# Patient Record
Sex: FEMALE | Race: WHITE | NOT HISPANIC OR LATINO | URBAN - METROPOLITAN AREA
[De-identification: names, ages, dates, MRNs, and addresses within clinical notes are randomized per-mention and may not be internally consistent; named-entity substitution may affect disease eponyms.]

---

## 2017-05-11 ENCOUNTER — OUTPATIENT (OUTPATIENT)
Dept: OUTPATIENT SERVICES | Facility: HOSPITAL | Age: 52
LOS: 1 days | End: 2017-05-11
Payer: COMMERCIAL

## 2017-05-11 ENCOUNTER — APPOINTMENT (OUTPATIENT)
Dept: MRI IMAGING | Facility: IMAGING CENTER | Age: 52
End: 2017-05-11

## 2017-05-11 DIAGNOSIS — Z00.8 ENCOUNTER FOR OTHER GENERAL EXAMINATION: ICD-10-CM

## 2017-05-11 PROCEDURE — C8937: CPT

## 2017-05-11 PROCEDURE — C8908: CPT

## 2017-05-11 PROCEDURE — A9585: CPT

## 2017-05-22 ENCOUNTER — APPOINTMENT (OUTPATIENT)
Dept: ULTRASOUND IMAGING | Facility: IMAGING CENTER | Age: 52
End: 2017-05-22

## 2017-05-22 ENCOUNTER — OUTPATIENT (OUTPATIENT)
Dept: OUTPATIENT SERVICES | Facility: HOSPITAL | Age: 52
LOS: 1 days | End: 2017-05-22
Payer: COMMERCIAL

## 2017-05-22 ENCOUNTER — APPOINTMENT (OUTPATIENT)
Dept: MAMMOGRAPHY | Facility: IMAGING CENTER | Age: 52
End: 2017-05-22

## 2017-05-22 DIAGNOSIS — Z00.8 ENCOUNTER FOR OTHER GENERAL EXAMINATION: ICD-10-CM

## 2017-05-22 PROCEDURE — 76641 ULTRASOUND BREAST COMPLETE: CPT

## 2017-05-22 PROCEDURE — G0279: CPT

## 2017-05-22 PROCEDURE — 77065 DX MAMMO INCL CAD UNI: CPT

## 2017-06-01 ENCOUNTER — OUTPATIENT (OUTPATIENT)
Dept: OUTPATIENT SERVICES | Facility: HOSPITAL | Age: 52
LOS: 1 days | End: 2017-06-01
Payer: COMMERCIAL

## 2017-06-01 ENCOUNTER — APPOINTMENT (OUTPATIENT)
Dept: MAMMOGRAPHY | Facility: IMAGING CENTER | Age: 52
End: 2017-06-01

## 2017-06-01 VITALS
HEIGHT: 65.5 IN | OXYGEN SATURATION: 100 % | RESPIRATION RATE: 16 BRPM | TEMPERATURE: 99 F | DIASTOLIC BLOOD PRESSURE: 77 MMHG | SYSTOLIC BLOOD PRESSURE: 118 MMHG | HEART RATE: 72 BPM | WEIGHT: 149.03 LBS

## 2017-06-01 DIAGNOSIS — Z98.890 OTHER SPECIFIED POSTPROCEDURAL STATES: Chronic | ICD-10-CM

## 2017-06-01 DIAGNOSIS — N63 UNSPECIFIED LUMP IN BREAST: ICD-10-CM

## 2017-06-01 DIAGNOSIS — Z00.00 ENCOUNTER FOR GENERAL ADULT MEDICAL EXAMINATION WITHOUT ABNORMAL FINDINGS: ICD-10-CM

## 2017-06-01 DIAGNOSIS — Z01.818 ENCOUNTER FOR OTHER PREPROCEDURAL EXAMINATION: ICD-10-CM

## 2017-06-01 LAB
HCT VFR BLD CALC: 40.2 % — SIGNIFICANT CHANGE UP (ref 34.5–45)
HGB BLD-MCNC: 13 G/DL — SIGNIFICANT CHANGE UP (ref 11.5–15.5)
MCHC RBC-ENTMCNC: 28 PG — SIGNIFICANT CHANGE UP (ref 27–34)
MCHC RBC-ENTMCNC: 32.3 GM/DL — SIGNIFICANT CHANGE UP (ref 32–36)
MCV RBC AUTO: 86.5 FL — SIGNIFICANT CHANGE UP (ref 80–100)
PLATELET # BLD AUTO: 217 K/UL — SIGNIFICANT CHANGE UP (ref 150–400)
RBC # BLD: 4.65 M/UL — SIGNIFICANT CHANGE UP (ref 3.8–5.2)
RBC # FLD: 12.9 % — SIGNIFICANT CHANGE UP (ref 10.3–14.5)
WBC # BLD: 6.34 K/UL — SIGNIFICANT CHANGE UP (ref 3.8–10.5)
WBC # FLD AUTO: 6.34 K/UL — SIGNIFICANT CHANGE UP (ref 3.8–10.5)

## 2017-06-01 PROCEDURE — C1739: CPT

## 2017-06-01 PROCEDURE — 19281 PERQ DEVICE BREAST 1ST IMAG: CPT

## 2017-06-01 PROCEDURE — G0463: CPT

## 2017-06-01 PROCEDURE — 85027 COMPLETE CBC AUTOMATED: CPT

## 2017-06-01 RX ORDER — CELECOXIB 200 MG/1
200 CAPSULE ORAL ONCE
Qty: 0 | Refills: 0 | Status: COMPLETED | OUTPATIENT
Start: 2017-06-07 | End: 2017-06-07

## 2017-06-01 RX ORDER — LIDOCAINE HCL 20 MG/ML
0.2 VIAL (ML) INJECTION ONCE
Qty: 0 | Refills: 0 | Status: DISCONTINUED | OUTPATIENT
Start: 2017-06-07 | End: 2017-06-22

## 2017-06-01 RX ORDER — ACETAMINOPHEN 500 MG
975 TABLET ORAL ONCE
Qty: 0 | Refills: 0 | Status: COMPLETED | OUTPATIENT
Start: 2017-06-07 | End: 2017-06-07

## 2017-06-01 RX ORDER — SODIUM CHLORIDE 9 MG/ML
3 INJECTION INTRAMUSCULAR; INTRAVENOUS; SUBCUTANEOUS EVERY 8 HOURS
Qty: 0 | Refills: 0 | Status: DISCONTINUED | OUTPATIENT
Start: 2017-06-07 | End: 2017-06-22

## 2017-06-01 NOTE — H&P PST ADULT - NSANTHOSAYNRD_GEN_A_CORE
No. ALFREDO screening performed.  STOP BANG Legend: 0-2 = LOW Risk; 3-4 = INTERMEDIATE Risk; 5-8 = HIGH Risk

## 2017-06-01 NOTE — H&P PST ADULT - HISTORY OF PRESENT ILLNESS
51 y.o. female Patient is a 51 y.o. female with h/o Left breast microcalcifications s/p needle core biopsy (2015) - benign, recent diagnostic studies revealed residual microcalcifications. She is scheduled for Left Breast Lumpectomy Mammo [facility] .

## 2017-06-02 ENCOUNTER — APPOINTMENT (OUTPATIENT)
Dept: MAMMOGRAPHY | Facility: IMAGING CENTER | Age: 52
End: 2017-06-02

## 2017-06-06 ENCOUNTER — RESULT REVIEW (OUTPATIENT)
Age: 52
End: 2017-06-06

## 2017-06-07 ENCOUNTER — TRANSCRIPTION ENCOUNTER (OUTPATIENT)
Age: 52
End: 2017-06-07

## 2017-06-07 ENCOUNTER — OUTPATIENT (OUTPATIENT)
Dept: OUTPATIENT SERVICES | Facility: HOSPITAL | Age: 52
LOS: 1 days | End: 2017-06-07
Payer: COMMERCIAL

## 2017-06-07 ENCOUNTER — RESULT REVIEW (OUTPATIENT)
Age: 52
End: 2017-06-07

## 2017-06-07 VITALS
HEART RATE: 59 BPM | WEIGHT: 149.03 LBS | TEMPERATURE: 98 F | SYSTOLIC BLOOD PRESSURE: 96 MMHG | HEIGHT: 65.5 IN | RESPIRATION RATE: 15 BRPM | OXYGEN SATURATION: 100 % | DIASTOLIC BLOOD PRESSURE: 64 MMHG

## 2017-06-07 VITALS
HEART RATE: 61 BPM | RESPIRATION RATE: 16 BRPM | OXYGEN SATURATION: 100 % | TEMPERATURE: 98 F | SYSTOLIC BLOOD PRESSURE: 110 MMHG | DIASTOLIC BLOOD PRESSURE: 66 MMHG

## 2017-06-07 DIAGNOSIS — N63 UNSPECIFIED LUMP IN BREAST: ICD-10-CM

## 2017-06-07 DIAGNOSIS — Z98.890 OTHER SPECIFIED POSTPROCEDURAL STATES: Chronic | ICD-10-CM

## 2017-06-07 PROCEDURE — 76098 X-RAY EXAM SURGICAL SPECIMEN: CPT

## 2017-06-07 PROCEDURE — 88307 TISSUE EXAM BY PATHOLOGIST: CPT | Mod: 26

## 2017-06-07 PROCEDURE — 88307 TISSUE EXAM BY PATHOLOGIST: CPT

## 2017-06-07 PROCEDURE — 19301 PARTIAL MASTECTOMY: CPT | Mod: LT

## 2017-06-07 PROCEDURE — C1889: CPT

## 2017-06-07 PROCEDURE — 76098 X-RAY EXAM SURGICAL SPECIMEN: CPT | Mod: 26

## 2017-06-07 RX ORDER — OXYCODONE HYDROCHLORIDE 5 MG/1
5 TABLET ORAL ONCE
Qty: 0 | Refills: 0 | Status: DISCONTINUED | OUTPATIENT
Start: 2017-06-07 | End: 2017-06-07

## 2017-06-07 RX ORDER — ONDANSETRON 8 MG/1
4 TABLET, FILM COATED ORAL ONCE
Qty: 0 | Refills: 0 | Status: DISCONTINUED | OUTPATIENT
Start: 2017-06-07 | End: 2017-06-22

## 2017-06-07 RX ORDER — CELECOXIB 200 MG/1
200 CAPSULE ORAL ONCE
Qty: 0 | Refills: 0 | Status: DISCONTINUED | OUTPATIENT
Start: 2017-06-07 | End: 2017-06-22

## 2017-06-07 RX ORDER — ACETAMINOPHEN WITH CODEINE 300MG-30MG
1 TABLET ORAL
Qty: 15 | Refills: 0
Start: 2017-06-07

## 2017-06-07 RX ORDER — SODIUM CHLORIDE 9 MG/ML
1000 INJECTION, SOLUTION INTRAVENOUS
Qty: 0 | Refills: 0 | Status: DISCONTINUED | OUTPATIENT
Start: 2017-06-07 | End: 2017-06-22

## 2017-06-07 RX ADMIN — Medication 975 MILLIGRAM(S): at 09:01

## 2017-06-07 RX ADMIN — CELECOXIB 200 MILLIGRAM(S): 200 CAPSULE ORAL at 09:01

## 2017-06-07 NOTE — ASU DISCHARGE PLAN (ADULT/PEDIATRIC). - ACTIVITY LEVEL
elevate extremity/no exercise/no sports/gym/no tub baths/no weight bearing/weight bearing as tolerated/no heavy lifting

## 2017-06-07 NOTE — ASU DISCHARGE PLAN (ADULT/PEDIATRIC). - MEDICATION SUMMARY - MEDICATIONS TO TAKE
I will START or STAY ON the medications listed below when I get home from the hospital:    acetaminophen-codeine 300 mg-15 mg oral tablet  -- 1 tab(s) by mouth every 6 hours, As Needed PRN moderate to severe pain -for muscle spasm -for moderate pain MDD:DO NOT TAKE MORE THAN 3 TABS DAILY  -- Caution federal law prohibits the transfer of this drug to any person other  than the person for whom it was prescribed.  May cause drowsiness.  Alcohol may intensify this effect.  Use care when operating dangerous machinery.  This product contains acetaminophen.  Do not use  with any other product containing acetaminophen to prevent possible liver damage.  Using more of this medication than prescribed may cause serious breathing problems.    -- Indication: For MODERATE TO SEVERE PAIN RELIEF     Pepcid 20 mg oral tablet  --  by mouth 2 doses preop  -- Indication: For Reflux, GERD

## 2017-06-07 NOTE — ASU DISCHARGE PLAN (ADULT/PEDIATRIC). - MEDICATION SUMMARY - MEDICATIONS TO CHANGE
I will SWITCH the dose or number of times a day I take the medications listed below when I get home from the hospital:    Pepcid 20 mg oral tablet  --  by mouth 2 doses preop

## 2017-06-07 NOTE — ASU DISCHARGE PLAN (ADULT/PEDIATRIC). - SPECIAL INSTRUCTIONS
DO NOT REMOVE STERI-STRIPES, KEEP WOUND CLEAN DRY AND INTACT. DO NOT DRIVE OPERATE MACHINERY MAKE IMPORTANT DECISIONS WHILE TAKING PAIN MEDICATION DO NOT REMOVE STERI-STRIPES, KEEP WOUND CLEAN DRY AND INTACT. DO NOT DRIVE OPERATE MACHINERY MAKE IMPORTANT DECISIONS WHILE TAKING PAIN MEDICATION. DO NOT COMBINE NARCOTICS AND PAIN MEDICATIONS.

## 2017-06-07 NOTE — ASU DISCHARGE PLAN (ADULT/PEDIATRIC). - FOLLOWUP APPOINTMENT CLINIC/PHYSICIAN
Please follow up with Dr. Orellana within one week of discharge from the hospital.  [address] [place] [address], [place] [address]  [phone]

## 2017-06-07 NOTE — ASU DISCHARGE PLAN (ADULT/PEDIATRIC). - NOTIFY
Swelling that continues/Persistent Nausea and Vomiting/Excessive Diarrhea/Fever greater than 101/Unable to Urinate/Numbness, color, or temperature change to extremity/Bleeding that does not stop/GYN Fever>100.4/Inability to Tolerate Liquids or Foods/Numbness, tingling/Pain not relieved by Medications/Increased Irritability or Sluggishness

## 2017-06-07 NOTE — ASU DISCHARGE PLAN (ADULT/PEDIATRIC). - ITEMS TO FOLLOWUP WITH YOUR PHYSICIAN'S
Please follow up with Dr. Orellana within one week of discharge from the hospital.   Please follow up within one week of discharge with your PMD.

## 2017-06-13 ENCOUNTER — APPOINTMENT (OUTPATIENT)
Dept: OPHTHALMOLOGY | Facility: CLINIC | Age: 52
End: 2017-06-13

## 2017-06-15 LAB — SURGICAL PATHOLOGY STUDY: SIGNIFICANT CHANGE UP

## 2018-01-23 ENCOUNTER — APPOINTMENT (OUTPATIENT)
Dept: ULTRASOUND IMAGING | Facility: IMAGING CENTER | Age: 53
End: 2018-01-23
Payer: COMMERCIAL

## 2018-01-23 ENCOUNTER — OUTPATIENT (OUTPATIENT)
Dept: OUTPATIENT SERVICES | Facility: HOSPITAL | Age: 53
LOS: 1 days | End: 2018-01-23
Payer: COMMERCIAL

## 2018-01-23 ENCOUNTER — APPOINTMENT (OUTPATIENT)
Dept: MAMMOGRAPHY | Facility: IMAGING CENTER | Age: 53
End: 2018-01-23
Payer: COMMERCIAL

## 2018-01-23 DIAGNOSIS — Z00.8 ENCOUNTER FOR OTHER GENERAL EXAMINATION: ICD-10-CM

## 2018-01-23 DIAGNOSIS — Z98.890 OTHER SPECIFIED POSTPROCEDURAL STATES: Chronic | ICD-10-CM

## 2018-01-23 PROCEDURE — 77063 BREAST TOMOSYNTHESIS BI: CPT | Mod: 26

## 2018-01-23 PROCEDURE — 77067 SCR MAMMO BI INCL CAD: CPT

## 2018-01-23 PROCEDURE — 76641 ULTRASOUND BREAST COMPLETE: CPT | Mod: 26,50

## 2018-01-23 PROCEDURE — 77063 BREAST TOMOSYNTHESIS BI: CPT

## 2018-01-23 PROCEDURE — 76641 ULTRASOUND BREAST COMPLETE: CPT

## 2018-01-23 PROCEDURE — 77067 SCR MAMMO BI INCL CAD: CPT | Mod: 26

## 2020-01-10 PROBLEM — N63 UNSPECIFIED LUMP IN BREAST: Chronic | Status: ACTIVE | Noted: 2017-06-01

## 2020-01-30 ENCOUNTER — OUTPATIENT (OUTPATIENT)
Dept: OUTPATIENT SERVICES | Facility: HOSPITAL | Age: 55
LOS: 1 days | End: 2020-01-30
Payer: COMMERCIAL

## 2020-01-30 ENCOUNTER — APPOINTMENT (OUTPATIENT)
Dept: ULTRASOUND IMAGING | Facility: IMAGING CENTER | Age: 55
End: 2020-01-30
Payer: COMMERCIAL

## 2020-01-30 ENCOUNTER — APPOINTMENT (OUTPATIENT)
Dept: MAMMOGRAPHY | Facility: IMAGING CENTER | Age: 55
End: 2020-01-30
Payer: COMMERCIAL

## 2020-01-30 DIAGNOSIS — Z98.890 OTHER SPECIFIED POSTPROCEDURAL STATES: Chronic | ICD-10-CM

## 2020-01-30 DIAGNOSIS — Z00.8 ENCOUNTER FOR OTHER GENERAL EXAMINATION: ICD-10-CM

## 2020-01-30 PROCEDURE — 77067 SCR MAMMO BI INCL CAD: CPT | Mod: 26

## 2020-01-30 PROCEDURE — 77067 SCR MAMMO BI INCL CAD: CPT

## 2020-01-30 PROCEDURE — 76641 ULTRASOUND BREAST COMPLETE: CPT | Mod: 26,RT

## 2020-01-30 PROCEDURE — 77063 BREAST TOMOSYNTHESIS BI: CPT | Mod: 26

## 2020-01-30 PROCEDURE — 76641 ULTRASOUND BREAST COMPLETE: CPT

## 2020-01-30 PROCEDURE — 77063 BREAST TOMOSYNTHESIS BI: CPT

## 2020-02-04 ENCOUNTER — APPOINTMENT (OUTPATIENT)
Dept: ULTRASOUND IMAGING | Facility: IMAGING CENTER | Age: 55
End: 2020-02-04
Payer: COMMERCIAL

## 2020-02-04 ENCOUNTER — OUTPATIENT (OUTPATIENT)
Dept: OUTPATIENT SERVICES | Facility: HOSPITAL | Age: 55
LOS: 1 days | End: 2020-02-04
Payer: COMMERCIAL

## 2020-02-04 DIAGNOSIS — Z98.890 OTHER SPECIFIED POSTPROCEDURAL STATES: Chronic | ICD-10-CM

## 2020-02-04 DIAGNOSIS — Z00.8 ENCOUNTER FOR OTHER GENERAL EXAMINATION: ICD-10-CM

## 2020-02-04 PROCEDURE — 76642 ULTRASOUND BREAST LIMITED: CPT | Mod: 26,LT

## 2020-02-04 PROCEDURE — 76642 ULTRASOUND BREAST LIMITED: CPT

## 2020-02-28 ENCOUNTER — APPOINTMENT (OUTPATIENT)
Dept: MRI IMAGING | Facility: CLINIC | Age: 55
End: 2020-02-28
Payer: COMMERCIAL

## 2020-02-28 ENCOUNTER — OUTPATIENT (OUTPATIENT)
Dept: OUTPATIENT SERVICES | Facility: HOSPITAL | Age: 55
LOS: 1 days | End: 2020-02-28
Payer: COMMERCIAL

## 2020-02-28 DIAGNOSIS — Z00.8 ENCOUNTER FOR OTHER GENERAL EXAMINATION: ICD-10-CM

## 2020-02-28 DIAGNOSIS — Z98.890 OTHER SPECIFIED POSTPROCEDURAL STATES: Chronic | ICD-10-CM

## 2020-02-28 PROCEDURE — C8937: CPT

## 2020-02-28 PROCEDURE — 77049 MRI BREAST C-+ W/CAD BI: CPT | Mod: 26

## 2020-02-28 PROCEDURE — C8908: CPT

## 2020-02-28 PROCEDURE — A9585: CPT

## 2020-03-06 ENCOUNTER — OUTPATIENT (OUTPATIENT)
Dept: OUTPATIENT SERVICES | Facility: HOSPITAL | Age: 55
LOS: 1 days | End: 2020-03-06
Payer: COMMERCIAL

## 2020-03-06 ENCOUNTER — RESULT REVIEW (OUTPATIENT)
Age: 55
End: 2020-03-06

## 2020-03-06 ENCOUNTER — APPOINTMENT (OUTPATIENT)
Dept: MRI IMAGING | Facility: IMAGING CENTER | Age: 55
End: 2020-03-06
Payer: COMMERCIAL

## 2020-03-06 DIAGNOSIS — Z00.8 ENCOUNTER FOR OTHER GENERAL EXAMINATION: ICD-10-CM

## 2020-03-06 DIAGNOSIS — Z98.890 OTHER SPECIFIED POSTPROCEDURAL STATES: Chronic | ICD-10-CM

## 2020-03-06 PROCEDURE — 88305 TISSUE EXAM BY PATHOLOGIST: CPT | Mod: 26

## 2020-03-06 PROCEDURE — A9585: CPT

## 2020-03-06 PROCEDURE — 19085 BX BREAST 1ST LESION MR IMAG: CPT

## 2020-03-06 PROCEDURE — 77066 DX MAMMO INCL CAD BI: CPT

## 2020-03-06 PROCEDURE — 88305 TISSUE EXAM BY PATHOLOGIST: CPT

## 2020-03-06 PROCEDURE — 77066 DX MAMMO INCL CAD BI: CPT | Mod: 26

## 2020-03-06 PROCEDURE — 19085 BX BREAST 1ST LESION MR IMAG: CPT | Mod: RT

## 2020-03-06 PROCEDURE — 19086 BX BREAST ADD LESION MR IMAG: CPT | Mod: LT

## 2020-03-10 LAB — SURGICAL PATHOLOGY STUDY: SIGNIFICANT CHANGE UP

## 2021-05-05 NOTE — BRIEF OPERATIVE NOTE - OPERATION/FINDINGS
Lipid abnormalities are newly identifyed    Nutritional counseling was provided. and Pharmacotherapy as ordered.  Lipids will be reassessed in 3 months.  
Specimen removed and noted to be cleared

## 2021-05-06 ENCOUNTER — APPOINTMENT (OUTPATIENT)
Dept: OBGYN | Facility: CLINIC | Age: 56
End: 2021-05-06
Payer: COMMERCIAL

## 2021-05-06 VITALS
SYSTOLIC BLOOD PRESSURE: 130 MMHG | HEIGHT: 66 IN | DIASTOLIC BLOOD PRESSURE: 70 MMHG | BODY MASS INDEX: 23.78 KG/M2 | WEIGHT: 148 LBS

## 2021-05-06 DIAGNOSIS — Z86.000 PERSONAL HISTORY OF IN-SITU NEOPLASM OF BREAST: ICD-10-CM

## 2021-05-06 DIAGNOSIS — Z80.0 FAMILY HISTORY OF MALIGNANT NEOPLASM OF DIGESTIVE ORGANS: ICD-10-CM

## 2021-05-06 DIAGNOSIS — Z86.39 PERSONAL HISTORY OF OTHER ENDOCRINE, NUTRITIONAL AND METABOLIC DISEASE: ICD-10-CM

## 2021-05-06 DIAGNOSIS — Z80.1 FAMILY HISTORY OF MALIGNANT NEOPLASM OF TRACHEA, BRONCHUS AND LUNG: ICD-10-CM

## 2021-05-06 DIAGNOSIS — Z80.3 FAMILY HISTORY OF MALIGNANT NEOPLASM OF BREAST: ICD-10-CM

## 2021-05-06 DIAGNOSIS — Z78.9 OTHER SPECIFIED HEALTH STATUS: ICD-10-CM

## 2021-05-06 PROCEDURE — 99072 ADDL SUPL MATRL&STAF TM PHE: CPT

## 2021-05-06 PROCEDURE — 99204 OFFICE O/P NEW MOD 45 MIN: CPT

## 2021-05-20 PROBLEM — Z80.0 FAMILY HISTORY OF COLON CANCER: Status: ACTIVE | Noted: 2021-05-20

## 2021-05-20 PROBLEM — Z86.000 HISTORY OF DUCTAL CARCINOMA IN SITU (DCIS) OF BREAST: Status: RESOLVED | Noted: 2021-05-20 | Resolved: 2021-05-20

## 2021-05-20 PROBLEM — Z80.1 FAMILY HISTORY OF LUNG CANCER: Status: ACTIVE | Noted: 2021-05-20

## 2021-05-20 NOTE — DISCUSSION/SUMMARY
[FreeTextEntry1] : I sat down with the patient to discuss the imaging findings & her symptoms which warrant surgical intervention. I explained that the pelvic pain and heavy bleeding is related to large uterine myoma.  Discussion about management options for large uterine myoma and pain including continued observation and short term follow-up imaging, pain medication and hysterectomy.  \par \par  We discussed type of hysterectomy including total and supracervical.  She has a large uterus that has grown recently. In general uterine sarcoma is rare 1/500 and difficult to diagnose without pathological evaluation of myoma. The options for surgical approach including open, vaginal, and laparoscopic with or without robotic assistance were discussed. She will get an MRI to better evaluate myomata and follow-up after study.\par

## 2021-05-20 NOTE — HISTORY OF PRESENT ILLNESS
[Patient reported colonoscopy was normal] : Patient reported colonoscopy was normal [Y] : Positive pregnancy history [Frequency: Q ___ days] : menstrual periods occur approximately every [unfilled] days [Menarche Age: ____] : age at menarche was [unfilled] [ColonoscopyDate] : 2017 [MensesFreq] : 21 [LMPDate] : 4/22/21 [MensesLength] : 7-10 [PGHxTotal] : 4 [[facility]] : 3 [PGHxAbortions] : 1 [FreeTextEntry1] : 04/22/21

## 2021-05-20 NOTE — PHYSICAL EXAM
[Appropriately responsive] : appropriately responsive [Alert] : alert [No Acute Distress] : no acute distress [Soft] : soft [FreeTextEntry7] : enlarged irregular shaped uterus  [Labia Majora] : normal [Labia Minora] : normal [Normal] : normal [Tenderness] : tender [Enlarged ___ wks] : enlarged [unfilled] ~Uweeks [Uterine Adnexae] : normal

## 2021-06-06 ENCOUNTER — OUTPATIENT (OUTPATIENT)
Dept: OUTPATIENT SERVICES | Facility: HOSPITAL | Age: 56
LOS: 1 days | End: 2021-06-06
Payer: COMMERCIAL

## 2021-06-06 ENCOUNTER — APPOINTMENT (OUTPATIENT)
Dept: MRI IMAGING | Facility: CLINIC | Age: 56
End: 2021-06-06
Payer: COMMERCIAL

## 2021-06-06 DIAGNOSIS — Z98.890 OTHER SPECIFIED POSTPROCEDURAL STATES: Chronic | ICD-10-CM

## 2021-06-06 DIAGNOSIS — Z00.8 ENCOUNTER FOR OTHER GENERAL EXAMINATION: ICD-10-CM

## 2021-06-06 PROCEDURE — 72197 MRI PELVIS W/O & W/DYE: CPT

## 2021-06-06 PROCEDURE — A9585: CPT

## 2021-06-06 PROCEDURE — 72197 MRI PELVIS W/O & W/DYE: CPT | Mod: 26

## 2021-06-30 ENCOUNTER — NON-APPOINTMENT (OUTPATIENT)
Age: 56
End: 2021-06-30

## 2021-06-30 DIAGNOSIS — Z00.00 ENCOUNTER FOR GENERAL ADULT MEDICAL EXAMINATION W/OUT ABNORMAL FINDINGS: ICD-10-CM

## 2021-08-17 NOTE — ASU DISCHARGE PLAN (ADULT/PEDIATRIC). - A. DRIVE A CAR, OPERATE POWER TOOLS OR MACHINERY
Problem: Knowledge Deficit  Goal: Patient/family/caregiver demonstrates understanding of disease process, treatment plan, medications, and discharge instructions  Description: Complete learning assessment and assess knowledge base    Interventions:  - Provide teaching at level of understanding  - Provide teaching via preferred learning methods  Outcome: Progressing
Statement Selected

## 2021-09-30 NOTE — H&P PST ADULT - BP NONINVASIVE MEAN (MM HG)
[FreeTextEntry1] : Entered by Naveen Cortez, acting as scribe for Dr. Benny Rivas.\par \par The documentation recorded by the scribe accurately reflects the service I personally performed and the decisions made by me.  90

## 2021-10-14 ENCOUNTER — APPOINTMENT (OUTPATIENT)
Dept: OBGYN | Facility: CLINIC | Age: 56
End: 2021-10-14
Payer: COMMERCIAL

## 2021-10-14 DIAGNOSIS — N85.2 HYPERTROPHY OF UTERUS: ICD-10-CM

## 2021-10-14 PROCEDURE — 99214 OFFICE O/P EST MOD 30 MIN: CPT | Mod: 95

## 2021-10-14 RX ORDER — NAPROXEN 500 MG/1
500 TABLET ORAL
Qty: 1 | Refills: 2 | Status: ACTIVE | COMMUNITY
Start: 2021-10-14 | End: 1900-01-01

## 2021-10-15 ENCOUNTER — APPOINTMENT (OUTPATIENT)
Dept: DISASTER EMERGENCY | Facility: CLINIC | Age: 56
End: 2021-10-15

## 2021-10-16 LAB — SARS-COV-2 N GENE NPH QL NAA+PROBE: NOT DETECTED

## 2021-10-17 ENCOUNTER — TRANSCRIPTION ENCOUNTER (OUTPATIENT)
Age: 56
End: 2021-10-17

## 2021-10-18 ENCOUNTER — OUTPATIENT (OUTPATIENT)
Dept: OUTPATIENT SERVICES | Facility: HOSPITAL | Age: 56
LOS: 1 days | Discharge: HOME | End: 2021-10-18
Payer: COMMERCIAL

## 2021-10-18 ENCOUNTER — APPOINTMENT (OUTPATIENT)
Dept: OBGYN | Facility: AMBULATORY SURGERY CENTER | Age: 56
End: 2021-10-18

## 2021-10-18 DIAGNOSIS — Z98.890 OTHER SPECIFIED POSTPROCEDURAL STATES: Chronic | ICD-10-CM

## 2021-10-18 PROCEDURE — 58674 LAPS ABLTJ UTERINE FIBROIDS: CPT

## 2021-10-20 ENCOUNTER — NON-APPOINTMENT (OUTPATIENT)
Age: 56
End: 2021-10-20

## 2021-10-20 DIAGNOSIS — R30.0 DYSURIA: ICD-10-CM

## 2021-10-20 RX ORDER — CIPROFLOXACIN HYDROCHLORIDE 500 MG/1
500 TABLET, FILM COATED ORAL TWICE DAILY
Qty: 10 | Refills: 0 | Status: ACTIVE | COMMUNITY
Start: 2021-10-20 | End: 1900-01-01

## 2021-10-27 ENCOUNTER — APPOINTMENT (OUTPATIENT)
Dept: ULTRASOUND IMAGING | Facility: IMAGING CENTER | Age: 56
End: 2021-10-27
Payer: COMMERCIAL

## 2021-10-27 ENCOUNTER — APPOINTMENT (OUTPATIENT)
Dept: MRI IMAGING | Facility: IMAGING CENTER | Age: 56
End: 2021-10-27
Payer: COMMERCIAL

## 2021-10-27 ENCOUNTER — OUTPATIENT (OUTPATIENT)
Dept: OUTPATIENT SERVICES | Facility: HOSPITAL | Age: 56
LOS: 1 days | End: 2021-10-27
Payer: COMMERCIAL

## 2021-10-27 ENCOUNTER — APPOINTMENT (OUTPATIENT)
Dept: MAMMOGRAPHY | Facility: IMAGING CENTER | Age: 56
End: 2021-10-27
Payer: COMMERCIAL

## 2021-10-27 DIAGNOSIS — Z00.8 ENCOUNTER FOR OTHER GENERAL EXAMINATION: ICD-10-CM

## 2021-10-27 DIAGNOSIS — Z98.890 OTHER SPECIFIED POSTPROCEDURAL STATES: Chronic | ICD-10-CM

## 2021-10-27 PROCEDURE — 77063 BREAST TOMOSYNTHESIS BI: CPT | Mod: 26

## 2021-10-27 PROCEDURE — 76641 ULTRASOUND BREAST COMPLETE: CPT | Mod: 26,LT

## 2021-10-27 PROCEDURE — 76641 ULTRASOUND BREAST COMPLETE: CPT

## 2021-10-27 PROCEDURE — 77063 BREAST TOMOSYNTHESIS BI: CPT

## 2021-10-27 PROCEDURE — 77067 SCR MAMMO BI INCL CAD: CPT | Mod: 26

## 2021-10-27 PROCEDURE — 76641 ULTRASOUND BREAST COMPLETE: CPT | Mod: 26,RT

## 2021-10-27 PROCEDURE — 77067 SCR MAMMO BI INCL CAD: CPT

## 2021-11-04 ENCOUNTER — APPOINTMENT (OUTPATIENT)
Dept: ULTRASOUND IMAGING | Facility: IMAGING CENTER | Age: 56
End: 2021-11-04

## 2021-11-04 ENCOUNTER — LABORATORY RESULT (OUTPATIENT)
Age: 56
End: 2021-11-04

## 2021-11-04 ENCOUNTER — APPOINTMENT (OUTPATIENT)
Dept: OBGYN | Facility: CLINIC | Age: 56
End: 2021-11-04
Payer: COMMERCIAL

## 2021-11-04 VITALS
BODY MASS INDEX: 24.27 KG/M2 | DIASTOLIC BLOOD PRESSURE: 75 MMHG | SYSTOLIC BLOOD PRESSURE: 115 MMHG | HEIGHT: 66 IN | WEIGHT: 151 LBS

## 2021-11-04 DIAGNOSIS — N92.0 EXCESSIVE AND FREQUENT MENSTRUATION WITH REGULAR CYCLE: ICD-10-CM

## 2021-11-04 DIAGNOSIS — Z01.818 ENCOUNTER FOR OTHER PREPROCEDURAL EXAMINATION: ICD-10-CM

## 2021-11-04 DIAGNOSIS — D25.9 LEIOMYOMA OF UTERUS, UNSPECIFIED: ICD-10-CM

## 2021-11-04 PROCEDURE — 58100 BIOPSY OF UTERUS LINING: CPT

## 2021-11-04 PROCEDURE — 99213 OFFICE O/P EST LOW 20 MIN: CPT | Mod: 25

## 2021-11-08 ENCOUNTER — APPOINTMENT (OUTPATIENT)
Dept: MAMMOGRAPHY | Facility: IMAGING CENTER | Age: 56
End: 2021-11-08
Payer: COMMERCIAL

## 2021-11-08 ENCOUNTER — APPOINTMENT (OUTPATIENT)
Dept: ULTRASOUND IMAGING | Facility: IMAGING CENTER | Age: 56
End: 2021-11-08
Payer: COMMERCIAL

## 2021-11-08 ENCOUNTER — OUTPATIENT (OUTPATIENT)
Dept: OUTPATIENT SERVICES | Facility: HOSPITAL | Age: 56
LOS: 1 days | End: 2021-11-08
Payer: COMMERCIAL

## 2021-11-08 DIAGNOSIS — Z98.890 OTHER SPECIFIED POSTPROCEDURAL STATES: Chronic | ICD-10-CM

## 2021-11-08 DIAGNOSIS — Z00.8 ENCOUNTER FOR OTHER GENERAL EXAMINATION: ICD-10-CM

## 2021-11-08 PROCEDURE — 77065 DX MAMMO INCL CAD UNI: CPT | Mod: 26,RT

## 2021-11-08 PROCEDURE — G0279: CPT

## 2021-11-08 PROCEDURE — 77065 DX MAMMO INCL CAD UNI: CPT

## 2021-11-08 PROCEDURE — 76642 ULTRASOUND BREAST LIMITED: CPT | Mod: 26,RT

## 2021-11-08 PROCEDURE — 76642 ULTRASOUND BREAST LIMITED: CPT

## 2021-11-08 PROCEDURE — G0279: CPT | Mod: 26

## 2021-11-10 NOTE — DISCUSSION/SUMMARY
[FreeTextEntry1] : I sat down with the patient to discuss the imaging findings & her symptoms which warrant surgical intervention. I explained that the pelvic pain and heavy bleeding is related to large uterine myoma.  Discussion about management options for large uterine myoma and pain including continued observation and short term follow-up imaging, uterine artery embolization, radiofreqency ablation of myomata, myomectomy and hysterectomy. \par \par  MRI reviewed appearance of typical myomata. Radiofrequency ablation there will be no biopsy of myomata.  She has a large uterus that has grown recently. In general uterine sarcoma is rare 1/500 and difficult to diagnose without pathological evaluation of myoma. Reviewed risk, benefits and alternative. Most patient has early relief of bulk symptoms. The myomata will shrink over the next 9 months bleeding improves after 3-6 months.  All questions and concerns addressed, patient expressed understanding. Encouraged to contact the office with any questions or concerns.

## 2021-11-10 NOTE — HISTORY OF PRESENT ILLNESS
[FreeTextEntry1] : 57 yo presents for follow-up prior to procedure on 10/18/21 she has enlarged myomatous uterus does not desire hysterectomy

## 2021-11-17 ENCOUNTER — NON-APPOINTMENT (OUTPATIENT)
Age: 56
End: 2021-11-17

## 2021-11-17 PROBLEM — Z01.818 PREOP TESTING: Status: RESOLVED | Noted: 2021-10-08 | Resolved: 2021-11-17

## 2021-11-17 LAB
25(OH)D3 SERPL-MCNC: 20.7 NG/ML
BASOPHILS # BLD AUTO: 0 K/UL
BASOPHILS NFR BLD AUTO: 0 %
CORE LAB BIOPSY: NORMAL
EOSINOPHIL # BLD AUTO: 0.14 K/UL
EOSINOPHIL NFR BLD AUTO: 2.6 %
ESTRADIOL SERPL-MCNC: 103 PG/ML
FSH SERPL-MCNC: 11 IU/L
HCT VFR BLD CALC: 39.9 %
HGB BLD-MCNC: 11.5 G/DL
IRON SATN MFR SERPL: 11 %
IRON SERPL-MCNC: 44 UG/DL
LYMPHOCYTES # BLD AUTO: 1.56 K/UL
LYMPHOCYTES NFR BLD AUTO: 29.3 %
MAN DIFF?: NORMAL
MCHC RBC-ENTMCNC: 23.8 PG
MCHC RBC-ENTMCNC: 28.8 GM/DL
MCV RBC AUTO: 82.4 FL
MONOCYTES # BLD AUTO: 0.37 K/UL
MONOCYTES NFR BLD AUTO: 6.9 %
NEUTROPHILS # BLD AUTO: 3.08 K/UL
NEUTROPHILS NFR BLD AUTO: 57.8 %
PLATELET # BLD AUTO: 300 K/UL
RBC # BLD: 4.84 M/UL
RBC # FLD: 22.8 %
TESTOST BND SERPL-MCNC: <0.2 PG/ML
TESTOSTERONE TOTAL S: <3 NG/DL
TIBC SERPL-MCNC: 392 UG/DL
UIBC SERPL-MCNC: 348 UG/DL
VIT B12 SERPL-MCNC: 679 PG/ML
WBC # FLD AUTO: 5.33 K/UL

## 2021-11-17 NOTE — PHYSICAL EXAM
[Appropriately responsive] : appropriately responsive [Alert] : alert [No Acute Distress] : no acute distress [No Murmurs] : no murmurs [Soft] : soft [Non-tender] : non-tender [Oriented x3] : oriented x3 [Labia Majora] : normal [Labia Minora] : normal [Normal] : normal [Enlarged ___ wks] : enlarged [unfilled] ~Uweeks [Anteversion] : anteverted [Uterine Adnexae] : normal

## 2021-11-17 NOTE — DISCUSSION/SUMMARY
[FreeTextEntry1] : 57 yo presents for follow-up she is concerned about her bleeding pattern and cancer. \par -given copy of  operative report\par -resume all activity without limitation\par -check hormone status and lab work\par -f/u endometrial biopsy\par -discussed perimenopause\par -f/u 3 months post procedure

## 2021-11-17 NOTE — PROCEDURE
[Endometrial Biopsy] : Endometrial biopsy [Time out performed] : Pre-procedure time out performed.  Patient's name, date of birth and procedure confirmed. [Consent Obtained] : Consent obtained [Irregular Bleeding] : irregular uterine bleeding [Risks] : risks [Benefits] : benefits [Alternatives] : alternatives [Patient] : patient [Infection] : infection [Bleeding] : bleeding [Allergic Reaction] : allergic reaction [Uterine Perforation] : uterine perforation [Pain] : pain [Negative] : negative pregnancy test [Betadine] : Betadine [None] : none [Easy Passage] : Easy passage [Anteverted] : anteverted [Sent to Pathology] : placed in buffered formalin and sent for pathology [Tolerated Well] : Patient tolerated the procedure well [No Complications] : No complications

## 2021-11-17 NOTE — HISTORY OF PRESENT ILLNESS
[FreeTextEntry1] : 55 yo s/p radiofreqency ablation of myomata on 10/18/21 postoperative course was complicated by urinary tract infection treated with antibiotics. She does not feel a difference in uterine texture or size yet. She is worried about endometrial cancer since her friend was just diagnosed. Has no hormonal symptoms. This past two months cycles have irregular.

## 2022-02-08 ENCOUNTER — APPOINTMENT (OUTPATIENT)
Dept: OBGYN | Facility: CLINIC | Age: 57
End: 2022-02-08

## 2023-01-06 ENCOUNTER — APPOINTMENT (OUTPATIENT)
Dept: MAMMOGRAPHY | Facility: IMAGING CENTER | Age: 58
End: 2023-01-06
Payer: COMMERCIAL

## 2023-01-06 ENCOUNTER — APPOINTMENT (OUTPATIENT)
Dept: ULTRASOUND IMAGING | Facility: IMAGING CENTER | Age: 58
End: 2023-01-06

## 2023-01-06 ENCOUNTER — OUTPATIENT (OUTPATIENT)
Dept: OUTPATIENT SERVICES | Facility: HOSPITAL | Age: 58
LOS: 1 days | End: 2023-01-06
Payer: COMMERCIAL

## 2023-01-06 DIAGNOSIS — Z00.8 ENCOUNTER FOR OTHER GENERAL EXAMINATION: ICD-10-CM

## 2023-01-06 DIAGNOSIS — Z98.890 OTHER SPECIFIED POSTPROCEDURAL STATES: Chronic | ICD-10-CM

## 2023-01-06 PROCEDURE — 77067 SCR MAMMO BI INCL CAD: CPT

## 2023-01-06 PROCEDURE — 77063 BREAST TOMOSYNTHESIS BI: CPT

## 2023-01-06 PROCEDURE — 77063 BREAST TOMOSYNTHESIS BI: CPT | Mod: 26

## 2023-01-06 PROCEDURE — 76641 ULTRASOUND BREAST COMPLETE: CPT

## 2023-01-06 PROCEDURE — 77067 SCR MAMMO BI INCL CAD: CPT | Mod: 26

## 2023-01-06 PROCEDURE — 76641 ULTRASOUND BREAST COMPLETE: CPT | Mod: 26,50

## 2023-04-27 ENCOUNTER — APPOINTMENT (OUTPATIENT)
Dept: ULTRASOUND IMAGING | Facility: IMAGING CENTER | Age: 58
End: 2023-04-27
Payer: COMMERCIAL

## 2023-04-27 ENCOUNTER — OUTPATIENT (OUTPATIENT)
Dept: OUTPATIENT SERVICES | Facility: HOSPITAL | Age: 58
LOS: 1 days | End: 2023-04-27
Payer: COMMERCIAL

## 2023-04-27 DIAGNOSIS — R22.1 LOCALIZED SWELLING, MASS AND LUMP, NECK: ICD-10-CM

## 2023-04-27 DIAGNOSIS — Z00.8 ENCOUNTER FOR OTHER GENERAL EXAMINATION: ICD-10-CM

## 2023-04-27 PROCEDURE — 76536 US EXAM OF HEAD AND NECK: CPT

## 2023-04-27 PROCEDURE — 76536 US EXAM OF HEAD AND NECK: CPT | Mod: 26

## 2023-05-26 ENCOUNTER — APPOINTMENT (OUTPATIENT)
Dept: ULTRASOUND IMAGING | Facility: IMAGING CENTER | Age: 58
End: 2023-05-26
Payer: COMMERCIAL

## 2023-05-26 ENCOUNTER — OUTPATIENT (OUTPATIENT)
Dept: OUTPATIENT SERVICES | Facility: HOSPITAL | Age: 58
LOS: 1 days | End: 2023-05-26
Payer: COMMERCIAL

## 2023-05-26 ENCOUNTER — TRANSCRIPTION ENCOUNTER (OUTPATIENT)
Age: 58
End: 2023-05-26

## 2023-05-26 DIAGNOSIS — Z00.8 ENCOUNTER FOR OTHER GENERAL EXAMINATION: ICD-10-CM

## 2023-05-26 DIAGNOSIS — Z98.890 OTHER SPECIFIED POSTPROCEDURAL STATES: Chronic | ICD-10-CM

## 2023-05-26 PROCEDURE — 10006 FNA BX W/US GDN EA ADDL: CPT

## 2023-05-26 PROCEDURE — 10005 FNA BX W/US GDN 1ST LES: CPT

## 2023-05-26 PROCEDURE — 88173 CYTOPATH EVAL FNA REPORT: CPT

## 2023-05-26 PROCEDURE — 88305 TISSUE EXAM BY PATHOLOGIST: CPT | Mod: 26

## 2023-05-26 PROCEDURE — 88305 TISSUE EXAM BY PATHOLOGIST: CPT

## 2023-05-26 PROCEDURE — 88172 CYTP DX EVAL FNA 1ST EA SITE: CPT

## 2023-05-26 PROCEDURE — 88173 CYTOPATH EVAL FNA REPORT: CPT | Mod: 26

## 2023-05-30 ENCOUNTER — OUTPATIENT (OUTPATIENT)
Dept: OUTPATIENT SERVICES | Facility: HOSPITAL | Age: 58
LOS: 1 days | End: 2023-05-30

## 2023-05-30 ENCOUNTER — APPOINTMENT (OUTPATIENT)
Dept: ULTRASOUND IMAGING | Facility: IMAGING CENTER | Age: 58
End: 2023-05-30

## 2023-05-30 DIAGNOSIS — Z00.8 ENCOUNTER FOR OTHER GENERAL EXAMINATION: ICD-10-CM

## 2023-05-30 DIAGNOSIS — Z98.890 OTHER SPECIFIED POSTPROCEDURAL STATES: Chronic | ICD-10-CM

## 2023-05-31 LAB — NON-GYNECOLOGICAL CYTOLOGY STUDY: SIGNIFICANT CHANGE UP

## 2023-06-08 ENCOUNTER — APPOINTMENT (OUTPATIENT)
Dept: CT IMAGING | Facility: CLINIC | Age: 58
End: 2023-06-08
Payer: COMMERCIAL

## 2023-06-08 PROCEDURE — 70491 CT SOFT TISSUE NECK W/DYE: CPT

## 2023-06-12 ENCOUNTER — APPOINTMENT (OUTPATIENT)
Dept: MRI IMAGING | Facility: IMAGING CENTER | Age: 58
End: 2023-06-12

## 2023-06-13 ENCOUNTER — APPOINTMENT (OUTPATIENT)
Dept: ULTRASOUND IMAGING | Facility: IMAGING CENTER | Age: 58
End: 2023-06-13

## 2023-06-22 ENCOUNTER — APPOINTMENT (OUTPATIENT)
Dept: SURGERY | Facility: CLINIC | Age: 58
End: 2023-06-22

## 2023-07-05 ENCOUNTER — OUTPATIENT (OUTPATIENT)
Dept: OUTPATIENT SERVICES | Facility: HOSPITAL | Age: 58
LOS: 1 days | Discharge: HOME | End: 2023-07-05

## 2023-07-05 DIAGNOSIS — C73 MALIGNANT NEOPLASM OF THYROID GLAND: ICD-10-CM

## 2023-07-06 ENCOUNTER — RESULT REVIEW (OUTPATIENT)
Age: 58
End: 2023-07-06

## 2023-07-06 ENCOUNTER — LABORATORY RESULT (OUTPATIENT)
Age: 58
End: 2023-07-06

## 2023-07-06 ENCOUNTER — NON-APPOINTMENT (OUTPATIENT)
Age: 58
End: 2023-07-06

## 2023-07-06 ENCOUNTER — APPOINTMENT (OUTPATIENT)
Dept: HEMATOLOGY ONCOLOGY | Facility: CLINIC | Age: 58
End: 2023-07-06
Payer: COMMERCIAL

## 2023-07-06 VITALS
SYSTOLIC BLOOD PRESSURE: 98 MMHG | RESPIRATION RATE: 16 BRPM | OXYGEN SATURATION: 99 % | BODY MASS INDEX: 23.63 KG/M2 | HEART RATE: 79 BPM | TEMPERATURE: 97.2 F | WEIGHT: 146.38 LBS | DIASTOLIC BLOOD PRESSURE: 63 MMHG

## 2023-07-06 LAB
ALBUMIN SERPL ELPH-MCNC: 4.9 G/DL — SIGNIFICANT CHANGE UP (ref 3.3–5)
ALP SERPL-CCNC: 57 U/L — SIGNIFICANT CHANGE UP (ref 40–120)
ALT FLD-CCNC: 22 U/L — SIGNIFICANT CHANGE UP (ref 10–45)
ANION GAP SERPL CALC-SCNC: 12 MMOL/L — SIGNIFICANT CHANGE UP (ref 5–17)
AST SERPL-CCNC: 32 U/L — SIGNIFICANT CHANGE UP (ref 10–40)
BASOPHILS # BLD AUTO: 0.03 K/UL — SIGNIFICANT CHANGE UP (ref 0–0.2)
BASOPHILS NFR BLD AUTO: 0.7 % — SIGNIFICANT CHANGE UP (ref 0–2)
BILIRUB SERPL-MCNC: 0.8 MG/DL — SIGNIFICANT CHANGE UP (ref 0.2–1.2)
BUN SERPL-MCNC: 12 MG/DL — SIGNIFICANT CHANGE UP (ref 7–23)
CALCIUM SERPL-MCNC: 10 MG/DL — SIGNIFICANT CHANGE UP (ref 8.4–10.5)
CHLORIDE SERPL-SCNC: 102 MMOL/L — SIGNIFICANT CHANGE UP (ref 96–108)
CO2 SERPL-SCNC: 26 MMOL/L — SIGNIFICANT CHANGE UP (ref 22–31)
CREAT SERPL-MCNC: 0.69 MG/DL — SIGNIFICANT CHANGE UP (ref 0.5–1.3)
EGFR: 101 ML/MIN/1.73M2 — SIGNIFICANT CHANGE UP
EOSINOPHIL # BLD AUTO: 0.06 K/UL — SIGNIFICANT CHANGE UP (ref 0–0.5)
EOSINOPHIL NFR BLD AUTO: 1.3 % — SIGNIFICANT CHANGE UP (ref 0–6)
GLUCOSE SERPL-MCNC: 94 MG/DL — SIGNIFICANT CHANGE UP (ref 70–99)
HCT VFR BLD CALC: 39.6 % — SIGNIFICANT CHANGE UP (ref 34.5–45)
HGB BLD-MCNC: 12.9 G/DL — SIGNIFICANT CHANGE UP (ref 11.5–15.5)
IMM GRANULOCYTES NFR BLD AUTO: 0.4 % — SIGNIFICANT CHANGE UP (ref 0–0.9)
LYMPHOCYTES # BLD AUTO: 1.55 K/UL — SIGNIFICANT CHANGE UP (ref 1–3.3)
LYMPHOCYTES # BLD AUTO: 34.4 % — SIGNIFICANT CHANGE UP (ref 13–44)
MCHC RBC-ENTMCNC: 27.7 PG — SIGNIFICANT CHANGE UP (ref 27–34)
MCHC RBC-ENTMCNC: 32.6 G/DL — SIGNIFICANT CHANGE UP (ref 32–36)
MCV RBC AUTO: 85.2 FL — SIGNIFICANT CHANGE UP (ref 80–100)
MONOCYTES # BLD AUTO: 0.24 K/UL — SIGNIFICANT CHANGE UP (ref 0–0.9)
MONOCYTES NFR BLD AUTO: 5.3 % — SIGNIFICANT CHANGE UP (ref 2–14)
NEUTROPHILS # BLD AUTO: 2.6 K/UL — SIGNIFICANT CHANGE UP (ref 1.8–7.4)
NEUTROPHILS NFR BLD AUTO: 57.9 % — SIGNIFICANT CHANGE UP (ref 43–77)
NRBC # BLD: 0 /100 WBCS — SIGNIFICANT CHANGE UP (ref 0–0)
PLATELET # BLD AUTO: 226 K/UL — SIGNIFICANT CHANGE UP (ref 150–400)
POTASSIUM SERPL-MCNC: 4.5 MMOL/L — SIGNIFICANT CHANGE UP (ref 3.5–5.3)
POTASSIUM SERPL-SCNC: 4.5 MMOL/L — SIGNIFICANT CHANGE UP (ref 3.5–5.3)
PROT SERPL-MCNC: 7.2 G/DL — SIGNIFICANT CHANGE UP (ref 6–8.3)
RBC # BLD: 4.65 M/UL — SIGNIFICANT CHANGE UP (ref 3.8–5.2)
RBC # FLD: 13 % — SIGNIFICANT CHANGE UP (ref 10.3–14.5)
SODIUM SERPL-SCNC: 140 MMOL/L — SIGNIFICANT CHANGE UP (ref 135–145)
WBC # BLD: 4.5 K/UL — SIGNIFICANT CHANGE UP (ref 3.8–10.5)
WBC # FLD AUTO: 4.5 K/UL — SIGNIFICANT CHANGE UP (ref 3.8–10.5)

## 2023-07-06 PROCEDURE — 99205 OFFICE O/P NEW HI 60 MIN: CPT

## 2023-07-11 NOTE — ASSESSMENT
[FreeTextEntry1] : 57F w/ stage 2 papillary thyroid cancer (pT3b, pN1b) s/p total thyroidectomy and right neck lymph node dissection 6/12/23 w/ positive inked surgical margins presents for treatment options\par \par - pt is high risk given extrathyroidal extension and lymph node involvement and would be appropriate for NICHOLS\par - while positive inked surgical margins, this is microscopic, and thyroglobulin level post surgery <0.2 is reassuring\par -Will discuss at [facility] if pre-NICHOLS I-131 scan would be beneficial. In our opinion, it does since it may help assess for distant disease and help tailor NICHOLS dose. \par - to see endocrinology Dr. Patiño and nuclear medicine Dr. Chatterjee. We corresponded with them during the visit and will continue to follow up on their recs. \par - Will present at endocrine tumor board\par - requested NGS Testing to be done on surgical specimen at [facility]# [id number] to identify potentially actionable mutations for future therapy if needed\par -All questions answered and emotional support provided. \par \par \par I was with the hematology/ oncology fellow, Dr. Arguelles for the entire visit and agree with above documentation\par

## 2023-07-11 NOTE — HISTORY OF PRESENT ILLNESS
[Disease: _____________________] : Disease: [unfilled] [T: ___] : T[unfilled] [N: ___] : N[unfilled] [AJCC Stage: ____] : AJCC Stage: [unfilled] [de-identified] : Ms Jacobs is a pleasant 57F incidentally found to have a lump in the neck in April 2023, U/S done suspicious for cancer in right thyroid nodule w/ right positive cervical adenopathy, s/p FNA bx 5/26/23 showing papillary thyroid cancer, CT neck w/ IV contrast 6/8/23 for presurgical eval w/o other areas of lymphadenopathy s/p total thyroidectomy and right neck lymph node dissection 6/12/23 at [facility] showing stage 2 papillary thyroid cancer (pT3b, pN1b) w/ positive inked surgical margins here to see med onc for a consultation visit. Pt has recovered well from her surgery. She has some swelling at the incision site, which is not painful\par \par Work up done thus far is listed below. \par \par US HEAD NECK SOFT TISSUE 4/27/23\par - Abnormal appearing right cervical adenopathy (~1.5cm) which may represent metastatic adenopathy. Moderately suspicious right thyroid nodule (~1cm). Consider further evaluation with ultrasound-guided fine-needle aspiration. TI-RAD 5: Highly suspicious (FNA if > 1 cm, Follow if > 0.5 cm)\par -TT and rt LND 6/12/23- done at [facility] by Dr. Perez. Path is as below\par \par Accession:                             [id number]\par \par Collected Date/Time:                   5/26/2023 09:10 EDT\par Received Date/Time:                    5/26/2023 16:45 EDT\par \par Fine Needle Aspiration Report - Auth (Verified)\par \par Specimen(s) Submitted\par 1. LYMPH NODE, LEVEL 3, RIGHT, US GUIDED FNA\par 2. THYROID, RIGHT UPPER, US GUIDED FNA\par \par Final Diagnosis\par 1. "LYMPH NODE", LEVEL 3, RIGHT, US GUIDED FNA\par POSITIVE FOR MALIGNANT CELLS.\par Papillary thyroid carcinoma.\par \par Cytology slides and cell block display a cellular specimen composed of syncytial groups and papillary clusters of atypical follicular cells showing enlarged oval nuclei, nuclear overlapping, nuclear grooves,\par intranuclear cytoplasmic pseudoinclusions, pale nuclei with powdery chromatin and marginally placed micronucleoli. No lymph node elements are present. this could represent a completely effaced lymph node please correlate with radiologic studies.\par \par 2. THYROID, RIGHT UPPER, US GUIDED FNA\par POSITIVE FOR MALIGNANCY (Category VI).\par Positive for papillary thyroid carcinoma.\par Cellular specimen composed of syncytial groups and papillary clusters of\par atypical follicular cells showing enlarged oval nuclei, focally molding,\par nuclear grooves, intranuclear cytoplasmic pseudoinclusions, pale nuclei\par with powdery chromatin and marginally placed micronucleoli.\par Bobbi Richadrson MD\par \par CT neck soft tissue w/ IV contrast 6/8/23 (Nontoxic multinodular goiter, biopsy proven thyroid papillary carcinoma, thyroidectomy scheduled for 6/12/2023)- Right thyroid lobe lesions measuring up to 1.1 cm better seen on ultrasound 4/27/2023. No infiltration into the surrounding tissues. Please also see results from fine-needle aspiration from 4/27/2023.\par - 1.9 cm right level III partially cystic lymph node corresponding with pathology proven metastasis.\par - No other lymphadenopathy identified by imaging criteria.\par II. PATH\par HISTOLOGIC STAGE CLASSIFICATION (pTNM, AJCC 8th Edition):\par pT3b: Gross extrathyroidal extension invading only strap muscles (sternohyoid, sternothyroid, thyrohyoid, or omohyoid muscles) from a tumor of any size\par pN1b: Metastasis to unilateral, bilateral, or contralateral lateral neck lymph nodes (levels I, II, III, IV, or V) or retropharyngeal lymph nodes\par III. PROCEDURE: Total thyroidectomy\par IV. TUMOR FOCALITY: Multifocal\par V. TUMOR SITE: Right lobe, Isthmus\par VI. TUMOR SIZE: Greatest dimension: 1.2 cm\par VII. MARGINS: Involved by carcinoma\par VIII. ANGIOINVASION: Not identified\par IX. LYMPHATIC INVASION: Present\par X. MITOTIC RATE: Mitotic Rate: 0 per 2 mm2\par XI. PERINEURAL INVASION: Present\par XII. EXTRATHYROIDAL EXTENSION: Present\par Extent - Invading only strap muscles\par XIII. REGIONAL LYMPH NODES: Number of Lymph Nodes Involved: 2\par Involved Arianne Level I-V, right\par Size of Largest Metastatic Deposit (centimeters): 1.7 cm\par Extranodal extension not identified\par Total Number of Lymph Nodes Examined: 30\par \par Level I-V examined, right\par XIV. ADDITIONAL PATHOLOGIC FINDINGS: None identified\par \par DIAGNOSIS:\par A. Lymph nodes, right level 3, excision:\par Thirteen lymph nodes, negative for metastatic carcinoma (0/13).\par \par B. Lymph nodes, right level 4, excision:\par Five lymph nodes, negative for metastatic carcinoma (0/5).\par \par C. Lymph nodes, right level 2, excision:\par Two of nine lymph nodes, positive for metastatic carcinoma (2/9).\par The largest metastatic focus measures 1.7 cm.\par No definitive extranodal extension is identified.\par \par D. Thyroid, excision:\par Papillary thyroid carcinoma, classic type.\par The carcinoma measures 1.2 cm.\par Two additional foci of microcarcinoma measuring 0.45 cm and <0.1 cm are identified.\par Psammoma bodies are present in the tumor and the surrounding nonneoplastic thyroid tissue.\par Focal extrathyroidal extension is identified.\par Lymphovascular and perineural invasion are identified.\par The carcinoma is present focally at the inked surgical resection margin.\par Please see cancer synoptic.\par \par E. Central compartment, excision:\par Three lymph nodes, negative for metastatic carcinoma (0/3).\par \par \par \par 7/6/23: patient presents for initial consultation. Denies fevers, chills, weight changes, no voice changes, no SOB, CP, abdominal pain, urine or bowel issues. She has some swelling at incision site. Has appt to see endocrinology next week, surgical site some numbness but no pain, no dysphagia. + night sweats she attributed to pre menopause (last period in March 2023). \par  [de-identified] : papillary thyroid cancer

## 2023-07-11 NOTE — PHYSICAL EXAM
[Fully active, able to carry on all pre-disease performance without restriction] : Status 0 - Fully active, able to carry on all pre-disease performance without restriction [Normal] : normoactive bowel sounds, soft and nontender, no hepatosplenomegaly or masses appreciated [de-identified] : surgical incision scar appreciated over neck, no erythema, no tenderness to palpation, no lymphadenopathy appreciated

## 2023-07-12 ENCOUNTER — APPOINTMENT (OUTPATIENT)
Dept: ENDOCRINOLOGY | Facility: CLINIC | Age: 58
End: 2023-07-12
Payer: COMMERCIAL

## 2023-07-12 VITALS
DIASTOLIC BLOOD PRESSURE: 66 MMHG | HEIGHT: 66 IN | HEART RATE: 65 BPM | SYSTOLIC BLOOD PRESSURE: 100 MMHG | WEIGHT: 145 LBS | BODY MASS INDEX: 23.3 KG/M2 | OXYGEN SATURATION: 99 %

## 2023-07-12 DIAGNOSIS — E03.9 HYPOTHYROIDISM, UNSPECIFIED: ICD-10-CM

## 2023-07-12 PROCEDURE — 99205 OFFICE O/P NEW HI 60 MIN: CPT

## 2023-07-12 NOTE — HISTORY OF PRESENT ILLNESS
[FreeTextEntry1] : CHIEF COMPLAINT: Thyroid cancer\par \par REFERRED BY: PCP Dr. Patricia Payan\par Heme-onc: Dr. Yeung\par \par Available prior medical records reviewed. \par \par HISTORY OF PRESENTING ILLNESS:\par The patient is a 57-year-old female being seen in the office today for evaluation of thyroid cancer.  She is originally from \par [facility].  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.\par \par Initially discovered nodule: Incidentally discovered lump in the neck April 2023, self discovered.\par Thyroid ultrasound 4/27/2023: RUP 1 cm TI-RADS 4 nodule, abnormal appearing 1.5 cm right level 3 lymph node.\par \par FNA: 5/26/2023\par RUP nodule: Positive for malignancy, [place] 6, PTC\par Right level 3 lymph node: Positive for malignant cells, PTC\par Molecular: N/A\par \par Surgery: 6/12/2023 total thyroidectomy with central and right lateral neck dissection at [facility], Dr. Lane Perez\par Surgical Pathology: \par Multifocal PTC, classic/conventional.\par Right lobe/isthmus 1.2 cm, additional foci of microcarcinoma 0.45 cm and <0.1 cm. \par Margins positive.  No angioinvasion.  Lymphatic invasion present. No increased mitoses.  Perineural invasion present.\par Gross extrathyroidal extension identified, extending only to strap muscles.\par 2/30 LN positive for metastatic thyroid cancer, largest 1.7 cm.  Both LN right level 2.  No extranodal extension.\par \par 2015 EMILEE Risk: High (gross extrathyroidal extension)\par AJCC 8th edition TNM Stage: [id number], Stage 2\par \par NICHOLS Treatment: Not yet\par Surveillance: Thyroglobulin/thyroglobulin antibodies negative 7/6/2023 (~4 weeks postop)\par \par Postsurgical Hypothyroidism: \par She is currently taking 100 mcg daily of levothyroxine. Reports compliance with medication. \par She is taking it appropriately, on an empty stomach at least 30 minutes before food. \par Denies any symptoms of hypo or hyperthyroidism at this time.\par TSH 0.07, 7/6/2023.\par \par No family history of thyroid cancer or thyroid disease. \par No personal history of radiation exposure to the head and neck area. \par \par She was told she has a normal calcium postoperatively, did not require any calcium supplements.  Denies any knowledge of having postoperative hypoparathyroidism.\par \par Social history: She is traveling to [facility] from 9/21/2023 to 10/12/2023.

## 2023-07-12 NOTE — ASSESSMENT
[FreeTextEntry1] : 1. Thyroid Cancer: \par \par Surgery: 6/12/2023 total thyroidectomy with central and right lateral neck dissection at [facility], Dr. Lane Perez\par Surgical Pathology: \par Multifocal PTC, classic/conventional.\par Right lobe/isthmus 1.2 cm, additional foci of microcarcinoma 0.45 cm and <0.1 cm. \par Margins positive.  No angioinvasion.  Lymphatic invasion present. No increased mitoses.  Perineural invasion present.\par Gross extrathyroidal extension identified, extending only to strap muscles.\par 2/30 LN positive for metastatic thyroid cancer, largest 1.7 cm.  Both LN right level 2.  No extranodal extension.\par \par 2015 EMILEE Risk: High (gross extrathyroidal extension)\par AJCC 8th edition TNM Stage: [id number], Stage 2\par \par NICHOLS Treatment: Not yet\par Surveillance: Thyroglobulin/thyroglobulin antibodies negative 7/6/2023 (~4 weeks postop)\par \par PLAN: \par –Maintain TSH <0.1 for now given EMILEE high risk cancer\par – Plan for pretherapy NICHOLS uptake and scan to assess uptake/extent of disease, then plan for ~175 mCi of NICHOLS, unless a higher dose is indicated based on pretherapy scan.  Will refer to Dr. Chatterjee, nuclear medicine.\par – Follow-up NGS testing requested by Dr. Yeung\par –Thyroglobulin undetectable 4 weeks postop, can recheck every 3 to 6 months for the first year, then to intensify if excellent response to therapy\par – First surveillance thyroid ultrasound can be approximately 4-6 months after NICHOLS therapy\par \par 2. Postsurgical Hypothyroidism \par TSH goal: <0.1 given EMILEE high risk thyroid cancer\par \par PLAN: \par - continue Levothyroxine 100 mcg daily, TSH at goal 7/6/2023\par - we discussed the importance of medication hygiene:\par           Take levothyroxine with water on an empty stomach, at least 30 minutes before any food/beverages or other medication intake. \par           Space any iron or calcium containing supplements by at least 4 hours after/before levothyroxine intake. \par - repeat TSH and free T4 at to next visit \par \par RTC in 3-4 months.\par \par Lei Patiño MD\par [facility]\par Endocrinology at [place] \par [address], [address]\par Ph: [phone]\par Fax: [phone]\par \par

## 2023-08-21 ENCOUNTER — APPOINTMENT (OUTPATIENT)
Dept: NUCLEAR MEDICINE | Facility: HOSPITAL | Age: 58
End: 2023-08-21

## 2023-08-21 ENCOUNTER — OUTPATIENT (OUTPATIENT)
Dept: OUTPATIENT SERVICES | Facility: HOSPITAL | Age: 58
LOS: 1 days | End: 2023-08-21
Payer: COMMERCIAL

## 2023-08-21 DIAGNOSIS — Z98.890 OTHER SPECIFIED POSTPROCEDURAL STATES: Chronic | ICD-10-CM

## 2023-08-21 DIAGNOSIS — C73 MALIGNANT NEOPLASM OF THYROID GLAND: ICD-10-CM

## 2023-08-21 LAB — HCG SERPL-ACNC: <2 MIU/ML — SIGNIFICANT CHANGE UP

## 2023-08-22 ENCOUNTER — APPOINTMENT (OUTPATIENT)
Dept: NUCLEAR MEDICINE | Facility: HOSPITAL | Age: 58
End: 2023-08-22

## 2023-08-23 ENCOUNTER — RESULT REVIEW (OUTPATIENT)
Age: 58
End: 2023-08-23

## 2023-08-23 ENCOUNTER — APPOINTMENT (OUTPATIENT)
Dept: NUCLEAR MEDICINE | Facility: HOSPITAL | Age: 58
End: 2023-08-23

## 2023-08-23 PROCEDURE — A9528: CPT

## 2023-08-23 PROCEDURE — 96372 THER/PROPH/DIAG INJ SC/IM: CPT

## 2023-08-23 PROCEDURE — 84702 CHORIONIC GONADOTROPIN TEST: CPT

## 2023-08-23 RX ORDER — PROCHLORPERAZINE MALEATE 10 MG/1
10 TABLET ORAL EVERY 6 HOURS
Qty: 20 | Refills: 0 | Status: ACTIVE | COMMUNITY
Start: 2023-08-23 | End: 1900-01-01

## 2023-08-24 ENCOUNTER — INPATIENT (INPATIENT)
Facility: HOSPITAL | Age: 58
LOS: 4 days | Discharge: HOME | DRG: 871 | End: 2023-08-29
Attending: INTERNAL MEDICINE | Admitting: INTERNAL MEDICINE
Payer: COMMERCIAL

## 2023-08-24 ENCOUNTER — NON-APPOINTMENT (OUTPATIENT)
Age: 58
End: 2023-08-24

## 2023-08-24 VITALS
OXYGEN SATURATION: 96 % | RESPIRATION RATE: 18 BRPM | DIASTOLIC BLOOD PRESSURE: 58 MMHG | SYSTOLIC BLOOD PRESSURE: 90 MMHG | HEART RATE: 96 BPM | HEIGHT: 66 IN | TEMPERATURE: 101 F | WEIGHT: 143.08 LBS

## 2023-08-24 DIAGNOSIS — R55 SYNCOPE AND COLLAPSE: ICD-10-CM

## 2023-08-24 DIAGNOSIS — Z98.890 OTHER SPECIFIED POSTPROCEDURAL STATES: Chronic | ICD-10-CM

## 2023-08-24 LAB
ALBUMIN SERPL ELPH-MCNC: 3.9 G/DL — SIGNIFICANT CHANGE UP (ref 3.3–5)
ALP SERPL-CCNC: 97 U/L — SIGNIFICANT CHANGE UP (ref 40–120)
ALT FLD-CCNC: 49 U/L — HIGH (ref 10–45)
ANION GAP SERPL CALC-SCNC: 13 MMOL/L — SIGNIFICANT CHANGE UP (ref 5–17)
APPEARANCE UR: ABNORMAL
APTT BLD: 30 SEC — SIGNIFICANT CHANGE UP (ref 24.5–35.6)
AST SERPL-CCNC: 75 U/L — HIGH (ref 10–40)
BACTERIA # UR AUTO: NEGATIVE — SIGNIFICANT CHANGE UP
BASE EXCESS BLDV CALC-SCNC: -0.2 MMOL/L — SIGNIFICANT CHANGE UP (ref -2–3)
BASOPHILS # BLD AUTO: 0 K/UL — SIGNIFICANT CHANGE UP (ref 0–0.2)
BASOPHILS NFR BLD AUTO: 0 % — SIGNIFICANT CHANGE UP (ref 0–2)
BILIRUB SERPL-MCNC: 0.9 MG/DL — SIGNIFICANT CHANGE UP (ref 0.2–1.2)
BILIRUB UR-MCNC: NEGATIVE — SIGNIFICANT CHANGE UP
BUN SERPL-MCNC: 13 MG/DL — SIGNIFICANT CHANGE UP (ref 7–23)
CA-I SERPL-SCNC: 1.15 MMOL/L — SIGNIFICANT CHANGE UP (ref 1.15–1.33)
CALCIUM SERPL-MCNC: 8.9 MG/DL — SIGNIFICANT CHANGE UP (ref 8.4–10.5)
CHLORIDE BLDV-SCNC: 101 MMOL/L — SIGNIFICANT CHANGE UP (ref 96–108)
CHLORIDE SERPL-SCNC: 102 MMOL/L — SIGNIFICANT CHANGE UP (ref 96–108)
CO2 BLDV-SCNC: 26 MMOL/L — SIGNIFICANT CHANGE UP (ref 22–26)
CO2 SERPL-SCNC: 23 MMOL/L — SIGNIFICANT CHANGE UP (ref 22–31)
COLOR SPEC: SIGNIFICANT CHANGE UP
CREAT SERPL-MCNC: 0.72 MG/DL — SIGNIFICANT CHANGE UP (ref 0.5–1.3)
DIFF PNL FLD: ABNORMAL
EGFR: 97 ML/MIN/1.73M2 — SIGNIFICANT CHANGE UP
EOSINOPHIL # BLD AUTO: 0 K/UL — SIGNIFICANT CHANGE UP (ref 0–0.5)
EOSINOPHIL NFR BLD AUTO: 0 % — SIGNIFICANT CHANGE UP (ref 0–6)
EPI CELLS # UR: 5 /HPF — SIGNIFICANT CHANGE UP
GAS PNL BLDV: 136 MMOL/L — SIGNIFICANT CHANGE UP (ref 136–145)
GAS PNL BLDV: SIGNIFICANT CHANGE UP
GIANT PLATELETS BLD QL SMEAR: PRESENT — SIGNIFICANT CHANGE UP
GLUCOSE BLDV-MCNC: 105 MG/DL — HIGH (ref 70–99)
GLUCOSE SERPL-MCNC: 108 MG/DL — HIGH (ref 70–99)
GLUCOSE UR QL: NEGATIVE — SIGNIFICANT CHANGE UP
HCO3 BLDV-SCNC: 25 MMOL/L — SIGNIFICANT CHANGE UP (ref 22–29)
HCT VFR BLD CALC: 39.7 % — SIGNIFICANT CHANGE UP (ref 34.5–45)
HCT VFR BLDA CALC: 38 % — SIGNIFICANT CHANGE UP (ref 34.5–46.5)
HGB BLD CALC-MCNC: 12.7 G/DL — SIGNIFICANT CHANGE UP (ref 11.7–16.1)
HGB BLD-MCNC: 12.8 G/DL — SIGNIFICANT CHANGE UP (ref 11.5–15.5)
HYALINE CASTS # UR AUTO: 2 /LPF — SIGNIFICANT CHANGE UP (ref 0–2)
INR BLD: 1.22 RATIO — HIGH (ref 0.85–1.18)
KETONES UR-MCNC: ABNORMAL
LACTATE BLDV-MCNC: 1.5 MMOL/L — SIGNIFICANT CHANGE UP (ref 0.5–2)
LEUKOCYTE ESTERASE UR-ACNC: NEGATIVE — SIGNIFICANT CHANGE UP
LYMPHOCYTES # BLD AUTO: 0.25 K/UL — LOW (ref 1–3.3)
LYMPHOCYTES # BLD AUTO: 3.5 % — LOW (ref 13–44)
MANUAL SMEAR VERIFICATION: SIGNIFICANT CHANGE UP
MCHC RBC-ENTMCNC: 27.1 PG — SIGNIFICANT CHANGE UP (ref 27–34)
MCHC RBC-ENTMCNC: 32.2 GM/DL — SIGNIFICANT CHANGE UP (ref 32–36)
MCV RBC AUTO: 83.9 FL — SIGNIFICANT CHANGE UP (ref 80–100)
METAMYELOCYTES # FLD: 0.9 % — HIGH (ref 0–0)
MONOCYTES # BLD AUTO: 0.31 K/UL — SIGNIFICANT CHANGE UP (ref 0–0.9)
MONOCYTES NFR BLD AUTO: 4.3 % — SIGNIFICANT CHANGE UP (ref 2–14)
NEUTROPHILS # BLD AUTO: 6.57 K/UL — SIGNIFICANT CHANGE UP (ref 1.8–7.4)
NEUTROPHILS NFR BLD AUTO: 88.7 % — HIGH (ref 43–77)
NEUTS BAND # BLD: 2.6 % — SIGNIFICANT CHANGE UP (ref 0–8)
NITRITE UR-MCNC: NEGATIVE — SIGNIFICANT CHANGE UP
PCO2 BLDV: 41 MMHG — SIGNIFICANT CHANGE UP (ref 39–42)
PH BLDV: 7.39 — SIGNIFICANT CHANGE UP (ref 7.32–7.43)
PH UR: 6.5 — SIGNIFICANT CHANGE UP (ref 5–8)
PLAT MORPH BLD: ABNORMAL
PLATELET # BLD AUTO: 218 K/UL — SIGNIFICANT CHANGE UP (ref 150–400)
PO2 BLDV: 35 MMHG — SIGNIFICANT CHANGE UP (ref 25–45)
POTASSIUM BLDV-SCNC: 3.4 MMOL/L — LOW (ref 3.5–5.1)
POTASSIUM SERPL-MCNC: 3.5 MMOL/L — SIGNIFICANT CHANGE UP (ref 3.5–5.3)
POTASSIUM SERPL-SCNC: 3.5 MMOL/L — SIGNIFICANT CHANGE UP (ref 3.5–5.3)
PROT SERPL-MCNC: 7 G/DL — SIGNIFICANT CHANGE UP (ref 6–8.3)
PROT UR-MCNC: ABNORMAL
PROTHROM AB SERPL-ACNC: 12.7 SEC — SIGNIFICANT CHANGE UP (ref 9.5–13)
RAPID RVP RESULT: SIGNIFICANT CHANGE UP
RBC # BLD: 4.73 M/UL — SIGNIFICANT CHANGE UP (ref 3.8–5.2)
RBC # FLD: 12.1 % — SIGNIFICANT CHANGE UP (ref 10.3–14.5)
RBC BLD AUTO: NORMAL — SIGNIFICANT CHANGE UP
RBC CASTS # UR COMP ASSIST: 6 /HPF — HIGH (ref 0–4)
SAO2 % BLDV: 62.6 % — LOW (ref 67–88)
SARS-COV-2 RNA SPEC QL NAA+PROBE: SIGNIFICANT CHANGE UP
SODIUM SERPL-SCNC: 138 MMOL/L — SIGNIFICANT CHANGE UP (ref 135–145)
SP GR SPEC: 1.02 — SIGNIFICANT CHANGE UP (ref 1.01–1.02)
T3 SERPL-MCNC: 86 NG/DL — SIGNIFICANT CHANGE UP (ref 80–200)
T4 AB SER-ACNC: 11.9 UG/DL — SIGNIFICANT CHANGE UP (ref 4.6–12)
TROPONIN T, HIGH SENSITIVITY RESULT: 7 NG/L — SIGNIFICANT CHANGE UP (ref 0–51)
TROPONIN T, HIGH SENSITIVITY RESULT: 9 NG/L — SIGNIFICANT CHANGE UP (ref 0–51)
TSH SERPL-MCNC: 21.8 UIU/ML — HIGH (ref 0.27–4.2)
UROBILINOGEN FLD QL: NEGATIVE — SIGNIFICANT CHANGE UP
WBC # BLD: 7.2 K/UL — SIGNIFICANT CHANGE UP (ref 3.8–10.5)
WBC # FLD AUTO: 7.2 K/UL — SIGNIFICANT CHANGE UP (ref 3.8–10.5)
WBC UR QL: 4 /HPF — SIGNIFICANT CHANGE UP (ref 0–5)

## 2023-08-24 PROCEDURE — 99285 EMERGENCY DEPT VISIT HI MDM: CPT | Mod: 25

## 2023-08-24 PROCEDURE — 76376 3D RENDER W/INTRP POSTPROCES: CPT | Mod: 26

## 2023-08-24 PROCEDURE — 78018 THYROID MET IMAGING BODY: CPT | Mod: 26,MA

## 2023-08-24 PROCEDURE — 78830 RP LOCLZJ TUM SPECT W/CT 1: CPT | Mod: 26

## 2023-08-24 PROCEDURE — 78020 THYROID MET UPTAKE: CPT | Mod: 26

## 2023-08-24 PROCEDURE — 71045 X-RAY EXAM CHEST 1 VIEW: CPT | Mod: 26

## 2023-08-24 PROCEDURE — 12011 RPR F/E/E/N/L/M 2.5 CM/<: CPT

## 2023-08-24 PROCEDURE — 70450 CT HEAD/BRAIN W/O DYE: CPT | Mod: 26,MA

## 2023-08-24 PROCEDURE — 70486 CT MAXILLOFACIAL W/O DYE: CPT | Mod: 26,MA

## 2023-08-24 RX ORDER — TETANUS TOXOID, REDUCED DIPHTHERIA TOXOID AND ACELLULAR PERTUSSIS VACCINE, ADSORBED 5; 2.5; 8; 8; 2.5 [IU]/.5ML; [IU]/.5ML; UG/.5ML; UG/.5ML; UG/.5ML
0.5 SUSPENSION INTRAMUSCULAR ONCE
Refills: 0 | Status: COMPLETED | OUTPATIENT
Start: 2023-08-24 | End: 2023-08-24

## 2023-08-24 RX ORDER — ONDANSETRON 8 MG/1
4 TABLET, FILM COATED ORAL ONCE
Refills: 0 | Status: COMPLETED | OUTPATIENT
Start: 2023-08-24 | End: 2023-08-24

## 2023-08-24 RX ORDER — SODIUM CHLORIDE 9 MG/ML
2000 INJECTION INTRAMUSCULAR; INTRAVENOUS; SUBCUTANEOUS ONCE
Refills: 0 | Status: COMPLETED | OUTPATIENT
Start: 2023-08-24 | End: 2023-08-24

## 2023-08-24 RX ORDER — SODIUM CHLORIDE 9 MG/ML
1000 INJECTION INTRAMUSCULAR; INTRAVENOUS; SUBCUTANEOUS ONCE
Refills: 0 | Status: COMPLETED | OUTPATIENT
Start: 2023-08-24 | End: 2023-08-24

## 2023-08-24 RX ORDER — SODIUM CHLORIDE 9 MG/ML
1000 INJECTION INTRAMUSCULAR; INTRAVENOUS; SUBCUTANEOUS
Refills: 0 | Status: DISCONTINUED | OUTPATIENT
Start: 2023-08-24 | End: 2023-08-25

## 2023-08-24 RX ORDER — ACETAMINOPHEN 500 MG
1000 TABLET ORAL ONCE
Refills: 0 | Status: COMPLETED | OUTPATIENT
Start: 2023-08-24 | End: 2023-08-24

## 2023-08-24 RX ADMIN — SODIUM CHLORIDE 1000 MILLILITER(S): 9 INJECTION INTRAMUSCULAR; INTRAVENOUS; SUBCUTANEOUS at 17:18

## 2023-08-24 RX ADMIN — ONDANSETRON 4 MILLIGRAM(S): 8 TABLET, FILM COATED ORAL at 11:33

## 2023-08-24 RX ADMIN — TETANUS TOXOID, REDUCED DIPHTHERIA TOXOID AND ACELLULAR PERTUSSIS VACCINE, ADSORBED 0.5 MILLILITER(S): 5; 2.5; 8; 8; 2.5 SUSPENSION INTRAMUSCULAR at 11:34

## 2023-08-24 RX ADMIN — Medication 400 MILLIGRAM(S): at 11:37

## 2023-08-24 RX ADMIN — SODIUM CHLORIDE 2000 MILLILITER(S): 9 INJECTION INTRAMUSCULAR; INTRAVENOUS; SUBCUTANEOUS at 11:37

## 2023-08-24 RX ADMIN — Medication 400 MILLIGRAM(S): at 17:18

## 2023-08-24 NOTE — ED ADULT NURSE NOTE - NSFALLRISKINTERV_ED_ALL_ED
Assistance OOB with selected safe patient handling equipment if applicable/Assistance with ambulation/Communicate fall risk and risk factors to all staff, patient, and family/Orthostatic vital signs/Provide visual cue: yellow wristband, yellow gown, etc/Reinforce activity limits and safety measures with patient and family/Call bell, personal items and telephone in reach/Instruct patient to call for assistance before getting out of bed/chair/stretcher/Non-slip footwear applied when patient is off stretcher/[place] to call system/Physically safe environment - no spills, clutter or unnecessary equipment/Purposeful Proactive Rounding/Room/bathroom lighting operational, light cord in reach

## 2023-08-24 NOTE — H&P ADULT - ASSESSMENT
Syncope likely from metastatic Disease / Hypotension from Dehydration/ infection   CT head shows a large area of vasogenic edema is noted in the anterior, inferior   right frontal lobe suspicious for metastatic disease. Also noted is a   partially calcified lesion in the region of the right anterior clinoid.   likely from Metastatic Disease to Brain   r/o Cardiac   Follow up Echo       Fevers r/o infectious causes   Follow up Blood cx       Metastatic Brain Lesion Hme Onc eval in [place]   MRI Brain   Neuro checks      Syncope likely from metastatic Disease / Hypotension from Dehydration/ infection   CT head shows a large area of vasogenic edema is noted in the anterior, inferior   right frontal lobe suspicious for metastatic disease. Also noted is a   partially calcified lesion in the region of the right anterior clinoid.   likely from Metastatic Disease to Brain   r/o Cardiac   Follow up Echo       Fevers s/p NICHOLS thyroxine r/o infectious causes   Follow up Blood cx       Metastatic Brain Lesion Hme Onc eval in [place]   MRI Brain   Neuro checks

## 2023-08-24 NOTE — ED ADULT NURSE REASSESSMENT NOTE - NS ED NURSE REASSESS COMMENT FT1
Pt placed on bedpan, urine collected and sent to lab. Pt a/ox4, denies pain and discomfort at this time. MD Talavera notified of BP, MD verbalized that pt can go to [facility] on pt's remaining fluids. Pt transported to [facility] at this time. Pending trop on pt arrival back to unit

## 2023-08-24 NOTE — ED ADULT NURSE NOTE - OBJECTIVE STATEMENT
57 y/o F w/ PMH of thyroid CA and recent removal came w/ complaints of syncope and fall. Pt states that she had been walking from the bathroom to her bed when she had an episode of syncope and fall forward to the wooden floor. Pt states that she is unsure how long she was out, and estimates that it was for 5-10 minutes. Pt's son states that pt is on a strict iodine diet, as she is going in for a test tomorrow. Pt states that she is currently on PO meds as well as recently starting synthroid shots. Pt also reports vomiting after fall. Pt A&Ox3 gross neuro intact, lungs cta bilaterally, no difficulty speaking in complete sentences, s1s2 heart sounds heard, pulses x 4, laceration noted on chin. iv 20g placed in LAC.  and son at bedside. Pt febrile orally at 101.6, pt placed on cardiac monitor reading nsr in 90s.

## 2023-08-24 NOTE — ED PROVIDER NOTE - CLINICAL SUMMARY MEDICAL DECISION MAKING FREE TEXT BOX
58-year-old female past medical history hyperlipidemia, thyroid cancer status post thyroidectomy 1 month ago presenting to the emergency department for 4 days of fever, generalized weakness and nausea, patient with syncopal episode this morning, positive head trauma, no blood thinners. Given hx and physical, ddx includes but is not limited to vasovagal, syncope, flu, covid, metabolic derangement, uti, pneumonia, bronchititis, reaction to thyroid injection, anemia, ICH. Plan for labs, ct head, ua, xr, meds, IVF reassess 58-year-old female past medical history hyperlipidemia, thyroid cancer status post thyroidectomy 1 month ago presenting to the emergency department for 4 days of fever, generalized weakness and nausea, patient with syncopal episode this morning, positive head trauma, no blood thinners. Given hx and physical, ddx includes but is not limited to vasovagal, syncope, flu, covid, metabolic derangement, uti, pneumonia, bronchitis, reaction to thyroid injection, anemia, ICH. Plan for labs, ct head, ua, xr, meds, IVF reassess dr weeks

## 2023-08-24 NOTE — H&P ADULT - HEIGHT IN INCHES
8/21/2018  Medication: Fioricet 50/325/40mg    Last refill: 7/31/18 #60 x0 refills  Last OV: 12/14/17  Labs:   Future OV: 9/6/18 with Marylou and 8/27/18 with ,     Please advise. Yaneth Mackey LPN      
From: Rose Negro  To: REJI Burnett  Sent: 8/20/2018 10:07 AM CDT  Subject: last refill needed before i see dr adames on 8/27      butalbital-APAP-caffeine -40 MG per tablet    Commonly known as: FIORICET    Your prescription is waiting for approval    Take 1 tablet by mouth every 6 hours as needed for migraine headache. max 4 tabs/day.    Learn more about this medication from the [place]        PrescribedJuly 31, 2018    Approved byREJI Burnett    Prescription [id number]      [id number] tablets    Last filledAugust 1, 2018    Next fillAugust 16, 2018  
6

## 2023-08-24 NOTE — ED PROVIDER NOTE - PHYSICAL EXAMINATION
GENERAL: Awake. Alert. NAD. Well nourished.  HEENT: NC/AT, PERRL, EOMI, Conjunctiva pink, no scleral icterus. Airway patent. Moist mucous membranes.  LUNGS: CTAB. No wheezes or rales noted.  CARDIAC: Chest non-tender to palpation. RRR.  ABDOMEN: No masses noted. Soft, NT, ND, no rebound, no guarding.  BACK: No midline spinal tenderness  EXT: No edema, no calf tenderness, distal pulses 2+ bilaterally  NEURO: A&Ox3. Moving all extremities. Sensation and strength intact throughout.  SKIN: Warm and dry.   PSYCH: Normal affect. GENERAL: Awake. Alert. NAD. Well nourished.  HEENT: NC/AT, PERRL, EOMI, Conjunctiva pink, no scleral icterus. Airway patent. Moist mucous membranes.  LUNGS: CTAB. No wheezes or rales noted.  CARDIAC:  RRR.  ABDOMEN: No masses noted. Soft, NT, ND, no rebound, no guarding.  BACK: No midline spinal tenderness  EXT: No edema, no calf tenderness, distal pulses 2+ bilaterally  NEURO: A&Ox3. Moving all extremities. Sensation and strength intact throughout.  SKIN: Warm and dry.   PSYCH: Normal affect.

## 2023-08-24 NOTE — ED PROVIDER NOTE - INTERPRETATION
EKG reviewed for rate, rhythm, axis, intervals and segments, including QRS morphology, P wave appearance T wave appearance, OR interval, and QT interval.  I find the EKG to be unremarkable in all of these regards except as follows: ?incomplete R bundle

## 2023-08-24 NOTE — ED ADULT TRIAGE NOTE - WEIGHT IN LBS
143
impairments found/functional limitations in following categories/therapy frequency/rehab potential/anticipated discharge recommendation

## 2023-08-24 NOTE — H&P ADULT - VTE RISK ASSESSMENT
Abdon had issued me a prescription for prostate issues. I think it is. I've had. I had a cardiac issue this morning and my cardiologist had me go off of that. I called my urologist and asked him about a medication that would not cause any heart issues and he suggested a rapid flow. So I needed to doctor Jordyn Galvez to write a script in for me or wrap a flow. OK give me a call if you need any more info. Thank you. VTE Assessment already completed for this visit

## 2023-08-24 NOTE — ED PROVIDER NOTE - OBJECTIVE STATEMENT
58-year-old female past medical history hyperlipidemia, thyroid cancer status post thyroidectomy 1 month ago presenting to the emergency department for 4 days of fever, generalized weakness and nausea, patient with syncopal episode this morning, positive head trauma, no blood thinners.  patient reports lightheadedness upon standing up from using the bathroom this morning,  had tunnel vision and then woke up on the floor.  Patient without any urinary or fecal incontinence after episode.  Patient reports 1 NBNB vomiting episode after syncope.  Patient now reporting generalized gradual onset headache status post fall.  No blurred vision, chest pain, SOB, palpitations, abd pain.  Denies cough, rhinorrhea, dysuria, hematuria.   No new back pain or neck pain.  Patient also currently receiving thyroid injection since Monday (received 3 doses).

## 2023-08-24 NOTE — ED PROVIDER NOTE - ATTENDING CONTRIBUTION TO CARE
57 yo female hx of thyroid CA s/p thyroidectomy p/w several days of fever, generalized weakness.  hypotensive here, borderline MAP improved somewhat with IVF.  CBC, CMP, CT brain concerning for metastatic disease.  will admit for further management.

## 2023-08-24 NOTE — H&P ADULT - HISTORY OF PRESENT ILLNESS
58-year-old female past medical history hyperlipidemia, thyroid cancer status post thyroidectomy 1 month ago p/w fever, generalized weakness and nausea x 4 days.   Patient states that she has been having fevers/ felt weak and passed out this morning.   No head trauma. No hx chest pain/ plapitations/ SOB.   Patient without any urinary or fecal incontinence after episode.  Patient reports an episode of vomiting after syncope.

## 2023-08-24 NOTE — ED PROVIDER NOTE - PROGRESS NOTE DETAILS
Jaylyn Talavera DO (PGY3): Ct neg for fractures/ICH. Ct showing vasogenic edema at frontal lobe concerning for possible metastasis in the setting of recent thyroid cancer. Pt tba to for MR, SPEC CT, also echo for syncopal episode. Spoke with Dr. Mccarthy, will accept pt.

## 2023-08-24 NOTE — ED ADULT NURSE REASSESSMENT NOTE - NS ED NURSE REASSESS COMMENT FT1
Pt arrived back from [facility], pt states that she needs to go to the bathroom, and pt placed on bed pan. Pt placed back on cardiac monitor, pt's bp 107/45, MD Talavera notified, pending new orders

## 2023-08-25 ENCOUNTER — APPOINTMENT (OUTPATIENT)
Dept: NUCLEAR MEDICINE | Facility: HOSPITAL | Age: 58
End: 2023-08-25

## 2023-08-25 DIAGNOSIS — C73 MALIGNANT NEOPLASM OF THYROID GLAND: ICD-10-CM

## 2023-08-25 DIAGNOSIS — C79.31 SECONDARY MALIGNANT NEOPLASM OF BRAIN: ICD-10-CM

## 2023-08-25 LAB
ANION GAP SERPL CALC-SCNC: 14 MMOL/L — SIGNIFICANT CHANGE UP (ref 5–17)
BUN SERPL-MCNC: 8 MG/DL — SIGNIFICANT CHANGE UP (ref 7–23)
CALCIUM SERPL-MCNC: 8.2 MG/DL — LOW (ref 8.4–10.5)
CHLORIDE SERPL-SCNC: 108 MMOL/L — SIGNIFICANT CHANGE UP (ref 96–108)
CK MB CFR SERPL CALC: <1 NG/ML — SIGNIFICANT CHANGE UP (ref 0–3.8)
CK SERPL-CCNC: 67 U/L — SIGNIFICANT CHANGE UP (ref 25–170)
CO2 SERPL-SCNC: 17 MMOL/L — LOW (ref 22–31)
CREAT SERPL-MCNC: 0.63 MG/DL — SIGNIFICANT CHANGE UP (ref 0.5–1.3)
EGFR: 103 ML/MIN/1.73M2 — SIGNIFICANT CHANGE UP
GLUCOSE SERPL-MCNC: 75 MG/DL — SIGNIFICANT CHANGE UP (ref 70–99)
HCT VFR BLD CALC: 34 % — LOW (ref 34.5–45)
HCT VFR BLD CALC: 34.1 % — LOW (ref 34.5–45)
HCV AB S/CO SERPL IA: 0.05 S/CO — SIGNIFICANT CHANGE UP (ref 0–0.99)
HCV AB SERPL-IMP: SIGNIFICANT CHANGE UP
HGB BLD-MCNC: 11.1 G/DL — LOW (ref 11.5–15.5)
HGB BLD-MCNC: 11.2 G/DL — LOW (ref 11.5–15.5)
MCHC RBC-ENTMCNC: 27.9 PG — SIGNIFICANT CHANGE UP (ref 27–34)
MCHC RBC-ENTMCNC: 27.9 PG — SIGNIFICANT CHANGE UP (ref 27–34)
MCHC RBC-ENTMCNC: 32.6 GM/DL — SIGNIFICANT CHANGE UP (ref 32–36)
MCHC RBC-ENTMCNC: 32.8 GM/DL — SIGNIFICANT CHANGE UP (ref 32–36)
MCV RBC AUTO: 84.8 FL — SIGNIFICANT CHANGE UP (ref 80–100)
MCV RBC AUTO: 85.4 FL — SIGNIFICANT CHANGE UP (ref 80–100)
NRBC # BLD: 0 /100 WBCS — SIGNIFICANT CHANGE UP (ref 0–0)
NRBC # BLD: 0 /100 WBCS — SIGNIFICANT CHANGE UP (ref 0–0)
PLATELET # BLD AUTO: 169 K/UL — SIGNIFICANT CHANGE UP (ref 150–400)
PLATELET # BLD AUTO: 176 K/UL — SIGNIFICANT CHANGE UP (ref 150–400)
POTASSIUM SERPL-MCNC: 3.3 MMOL/L — LOW (ref 3.5–5.3)
POTASSIUM SERPL-SCNC: 3.3 MMOL/L — LOW (ref 3.5–5.3)
RBC # BLD: 3.98 M/UL — SIGNIFICANT CHANGE UP (ref 3.8–5.2)
RBC # BLD: 4.02 M/UL — SIGNIFICANT CHANGE UP (ref 3.8–5.2)
RBC # FLD: 12.4 % — SIGNIFICANT CHANGE UP (ref 10.3–14.5)
RBC # FLD: 12.5 % — SIGNIFICANT CHANGE UP (ref 10.3–14.5)
SODIUM SERPL-SCNC: 139 MMOL/L — SIGNIFICANT CHANGE UP (ref 135–145)
THYROGLOB AB FLD IA-ACNC: <20 IU/ML — SIGNIFICANT CHANGE UP
THYROGLOB AB SERPL-ACNC: <20 IU/ML — SIGNIFICANT CHANGE UP
THYROGLOB SERPL-MCNC: 1 NG/ML — LOW (ref 1.6–59.9)
TROPONIN T, HIGH SENSITIVITY RESULT: 9 NG/L — SIGNIFICANT CHANGE UP (ref 0–51)
WBC # BLD: 3.79 K/UL — LOW (ref 3.8–10.5)
WBC # BLD: 3.86 K/UL — SIGNIFICANT CHANGE UP (ref 3.8–10.5)
WBC # FLD AUTO: 3.79 K/UL — LOW (ref 3.8–10.5)
WBC # FLD AUTO: 3.86 K/UL — SIGNIFICANT CHANGE UP (ref 3.8–10.5)

## 2023-08-25 PROCEDURE — 99222 1ST HOSP IP/OBS MODERATE 55: CPT

## 2023-08-25 PROCEDURE — 93306 TTE W/DOPPLER COMPLETE: CPT | Mod: 26

## 2023-08-25 PROCEDURE — 93356 MYOCRD STRAIN IMG SPCKL TRCK: CPT

## 2023-08-25 PROCEDURE — 70553 MRI BRAIN STEM W/O & W/DYE: CPT | Mod: 26

## 2023-08-25 RX ORDER — ONDANSETRON 8 MG/1
4 TABLET, FILM COATED ORAL ONCE
Refills: 0 | Status: COMPLETED | OUTPATIENT
Start: 2023-08-24 | End: 2023-08-25

## 2023-08-25 RX ORDER — ACETAMINOPHEN 500 MG
650 TABLET ORAL EVERY 6 HOURS
Refills: 0 | Status: DISCONTINUED | OUTPATIENT
Start: 2023-08-25 | End: 2023-08-26

## 2023-08-25 RX ORDER — LEVOTHYROXINE SODIUM 125 MCG
100 TABLET ORAL DAILY
Refills: 0 | Status: DISCONTINUED | OUTPATIENT
Start: 2023-08-25 | End: 2023-08-29

## 2023-08-25 RX ORDER — ONDANSETRON 8 MG/1
4 TABLET, FILM COATED ORAL EVERY 6 HOURS
Refills: 0 | Status: DISCONTINUED | OUTPATIENT
Start: 2023-08-25 | End: 2023-08-26

## 2023-08-25 RX ORDER — ACETAMINOPHEN 500 MG
1000 TABLET ORAL ONCE
Refills: 0 | Status: COMPLETED | OUTPATIENT
Start: 2023-08-24 | End: 2023-08-25

## 2023-08-25 RX ORDER — POTASSIUM CHLORIDE 20 MEQ
10 PACKET (EA) ORAL ONCE
Refills: 0 | Status: COMPLETED | OUTPATIENT
Start: 2023-08-25 | End: 2023-08-25

## 2023-08-25 RX ORDER — SODIUM CHLORIDE 9 MG/ML
500 INJECTION INTRAMUSCULAR; INTRAVENOUS; SUBCUTANEOUS ONCE
Refills: 0 | Status: COMPLETED | OUTPATIENT
Start: 2023-08-25 | End: 2023-08-25

## 2023-08-25 RX ORDER — ACETAMINOPHEN 500 MG
1000 TABLET ORAL ONCE
Refills: 0 | Status: COMPLETED | OUTPATIENT
Start: 2023-08-25 | End: 2023-08-25

## 2023-08-25 RX ADMIN — Medication 400 MILLIGRAM(S): at 00:25

## 2023-08-25 RX ADMIN — SODIUM CHLORIDE 100 MILLILITER(S): 9 INJECTION INTRAMUSCULAR; INTRAVENOUS; SUBCUTANEOUS at 00:25

## 2023-08-25 RX ADMIN — Medication 100 MICROGRAM(S): at 06:28

## 2023-08-25 RX ADMIN — Medication 400 MILLIGRAM(S): at 20:58

## 2023-08-25 RX ADMIN — Medication 10 MILLIEQUIVALENT(S): at 21:34

## 2023-08-25 RX ADMIN — Medication 1000 MILLIGRAM(S): at 01:04

## 2023-08-25 RX ADMIN — ONDANSETRON 4 MILLIGRAM(S): 8 TABLET, FILM COATED ORAL at 00:25

## 2023-08-25 RX ADMIN — Medication 1000 MILLIGRAM(S): at 21:58

## 2023-08-25 RX ADMIN — ONDANSETRON 4 MILLIGRAM(S): 8 TABLET, FILM COATED ORAL at 05:14

## 2023-08-25 RX ADMIN — SODIUM CHLORIDE 500 MILLILITER(S): 9 INJECTION INTRAMUSCULAR; INTRAVENOUS; SUBCUTANEOUS at 10:28

## 2023-08-25 NOTE — PROGRESS NOTE ADULT - SUBJECTIVE AND OBJECTIVE BOX
Patient is a 58y old  Female who presents with a chief complaint of Fever x 4 days (25 Aug 2023 14:31)      SUBJECTIVE / OVERNIGHT EVENTS: no events     MEDICATIONS  (STANDING):  levothyroxine 100 MICROGram(s) Oral daily  ondansetron Injectable 4 milliGRAM(s) IV Push every 6 hours    MEDICATIONS  (PRN):  acetaminophen     Tablet .. 650 milliGRAM(s) Oral every 6 hours PRN Temp greater or equal to 38C (100.4F)      CAPILLARY BLOOD GLUCOSE        I&O's Summary    T(C): 38.3 (08-25-23 @ 21:10), Max: 38.3 (08-25-23 @ 21:10)  HR: 79 (08-25-23 @ 21:10) (78 - 85)  BP: 113/74 (08-25-23 @ 21:10) (98/62 - 113/74)  RR: 18 (08-25-23 @ 21:10) (18 - 19)  SpO2: 95% (08-25-23 @ 21:10) (95% - 98%)    PHYSICAL EXAM:  GENERAL: NAD, well-developed  HEAD:  Atraumatic, Normocephalic  EYES: EOMI, PERRLA, conjunctiva and sclera clear  NECK: Supple, No JVD  CHEST/LUNG: Clear to auscultation bilaterally; No wheeze  HEART: Regular rate and rhythm; No murmurs, rubs, or gallops  ABDOMEN: Soft, Nontender, Nondistended; Bowel sounds present  EXTREMITIES:  2+ Peripheral Pulses, No clubbing, cyanosis, or edema  PSYCH: AAOx3  NEUROLOGY: non-focal  SKIN: No rashes or lesions    LABS:                        11.2   3.86  )-----------( 169      ( 25 Aug 2023 11:22 )             34.1     08-25    139  |  108  |  8   ----------------------------<  75  3.3<L>   |  17<L>  |  0.63    Ca    8.2<L>      25 Aug 2023 07:01    TPro  7.0  /  Alb  3.9  /  TBili  0.9  /  DBili  x   /  AST  75<H>  /  ALT  49<H>  /  AlkPhos  97  08-24    PT/INR - ( 24 Aug 2023 12:09 )   PT: 12.7 sec;   INR: 1.22 ratio         PTT - ( 24 Aug 2023 12:09 )  PTT:30.0 sec  CARDIAC MARKERS ( 25 Aug 2023 07:01 )  x     / x     / 67 U/L / x     / <1.0 ng/mL      Urinalysis Basic - ( 25 Aug 2023 07:01 )    Color: x / Appearance: x / SG: x / pH: x  Gluc: 75 mg/dL / Ketone: x  / Bili: x / Urobili: x   Blood: x / Protein: x / Nitrite: x   Leuk Esterase: x / RBC: x / WBC x   Sq Epi: x / Non Sq Epi: x / Bacteria: x        RADIOLOGY & ADDITIONAL TESTS:    Imaging Personally Reviewed:    Consultant(s) Notes Reviewed:      Care Discussed with Consultants/Other Providers:

## 2023-08-25 NOTE — CONSULT NOTE ADULT - SUBJECTIVE AND OBJECTIVE BOX
p (1480)     HPI: 58F Rhanded h/o thyroid CA s/p thyroidectomy 2mo ago, p/f gen weakness/fever/nausea s/p 1st dose NICHOLS. NM scan 8/24 w/ iodine-avid R inf med frontal lesion c/f met, no other lesions. CTH w/ 1.7cm R frontal lesion w/ sig vasogenic edema + addtl partial calcified lesion inferiorly.     Exam: Intact, no drift, FS, MURGUIA 5/5, SILT.    --Anticoagulation:    =====================  PAST MEDICAL HISTORY   Left breast mass      PAST SURGICAL HISTORY   No significant past surgical history    H/O benign breast biopsy      No Known Allergies      MEDICATIONS:  Antibiotics:    Neuro:  acetaminophen     Tablet .. 650 milliGRAM(s) Oral every 6 hours PRN  ondansetron Injectable 4 milliGRAM(s) IV Push every 6 hours    Other:  levothyroxine 100 MICROGram(s) Oral daily      SOCIAL HISTORY:   Occupation:   Marital Status:     FAMILY HISTORY:      ROS: Negative except per HPI    LABS:  PT/INR - ( 24 Aug 2023 12:09 )   PT: 12.7 sec;   INR: 1.22 ratio         PTT - ( 24 Aug 2023 12:09 )  PTT:30.0 sec                        11.2   3.86  )-----------( 169      ( 25 Aug 2023 11:22 )             34.1     08-25    139  |  108  |  8   ----------------------------<  75  3.3<L>   |  17<L>  |  0.63    Ca    8.2<L>      25 Aug 2023 07:01    TPro  7.0  /  Alb  3.9  /  TBili  0.9  /  DBili  x   /  AST  75<H>  /  ALT  49<H>  /  AlkPhos  97  08-24       p (1480)     HPI: 58F Rhanded h/o thyroid CA s/p thyroidectomy 2mo ago, p/f gen weakness/fever/nausea s/p 1st dose NICHOLS. NM scan 8/24 w/ iodine-avid R inf med frontal lesion c/f met. CTH w/ 1.7cm R frontal lesion w/ sig vasogenic edema + addtl partial calcified lesion inferiorly.     Exam: Intact, no drift, FS, MURGUIA 5/5, SILT.    --Anticoagulation:    =====================  PAST MEDICAL HISTORY   Left breast mass      PAST SURGICAL HISTORY   No significant past surgical history    H/O benign breast biopsy      No Known Allergies      MEDICATIONS:  Antibiotics:    Neuro:  acetaminophen     Tablet .. 650 milliGRAM(s) Oral every 6 hours PRN  ondansetron Injectable 4 milliGRAM(s) IV Push every 6 hours    Other:  levothyroxine 100 MICROGram(s) Oral daily      SOCIAL HISTORY:   Occupation:   Marital Status:     FAMILY HISTORY:      ROS: Negative except per HPI    LABS:  PT/INR - ( 24 Aug 2023 12:09 )   PT: 12.7 sec;   INR: 1.22 ratio         PTT - ( 24 Aug 2023 12:09 )  PTT:30.0 sec                        11.2   3.86  )-----------( 169      ( 25 Aug 2023 11:22 )             34.1     08-25    139  |  108  |  8   ----------------------------<  75  3.3<L>   |  17<L>  |  0.63    Ca    8.2<L>      25 Aug 2023 07:01    TPro  7.0  /  Alb  3.9  /  TBili  0.9  /  DBili  x   /  AST  75<H>  /  ALT  49<H>  /  AlkPhos  97  08-24

## 2023-08-25 NOTE — CHART NOTE - NSCHARTNOTEFT_GEN_A_CORE
Medicine PA Note     NALINI ZHAO  MRN-[id number]  Allergies    No Known Allergies    Intolerances         Notified by RN, Pt with complaints of chest pain. Pt seen and examined at bedside. Pt reports onset of chest pain ~2 hours ago while laying in bed. She states the pain is worse with inspiration and localized to her L chest wall with radiation to L shoulder. Pt denies headache, sob, palpitations, dizziness.     Vital Signs Last 24 Hrs  T(C): 37 (08-25-23 @ 04:19), Max: 38.8 (08-24-23 @ 23:16)  T(F): 98.6 (08-25-23 @ 04:19), Max: 101.9 (08-24-23 @ 23:16)  HR: 76 (08-25-23 @ 04:19) (70 - 98)  BP: 99/63 (08-25-23 @ 04:19) (66/55 - 119/58)  BP(mean): 65 (08-24-23 @ 23:16) (62 - 91)  RR: 18 (08-25-23 @ 04:19) (18 - 24)  SpO2: 95% (08-25-23 @ 04:19) (95% - 100%)                        12.8   7.20  )-----------( 218      ( 24 Aug 2023 12:09 )             39.7     08-24    138  |  102  |  13  ----------------------------<  108<H>  3.5   |  23  |  0.72    Ca    8.9      24 Aug 2023 12:09    TPro  7.0  /  Alb  3.9  /  TBili  0.9  /  DBili  x   /  AST  75<H>  /  ALT  49<H>  /  AlkPhos  97  08-24    PT/INR - ( 24 Aug 2023 12:09 )   PT: 12.7 sec;   INR: 1.22 ratio         PTT - ( 24 Aug 2023 12:09 )  PTT:30.0 sec      PHYSICAL EXAM:  GENERAL: NAD, well-developed  CHEST/LUNG: Clear to auscultation bilaterally; No wheezes, rhonchi or rales appreciated  HEART: Regular rate and rhythm; No murmurs, rubs, or gallops  ABDOMEN: Soft, Nontender, Nondistended; Bowel sounds present. No rebound, or guarding noted.  EXTREMITIES:  2+ Peripheral Pulses, No clubbing, cyanosis, or edema        Assessment/Plan: HPI:  58-year-old female past medical history hyperlipidemia, thyroid cancer status post thyroidectomy 1 month ago p/w fever, generalized weakness and nausea x 4 days.   Patient states that she has been having fevers/ felt weak and passed out this morning.  No head trauma. No hx chest pain/ plapitations/ SOB.   Patient without any urinary or fecal incontinence after episode.  Patient reports an episode of vomiting after syncope.  Pt now presenting with L sided chest pain x 2 hours.    Chest pain r/o ACS  >VS hemodynamically stable  >EKG performed - no acute changes from previous  >Cardiac enzymes ordered (Previous trops 9>7)  >TTE pending  >CXR reviewed with clear lungs  >Cardiology c/s in AM  >Given admission with syncope and new chest pain will upgrade patient to telemetry  > Will endorse to AM team, attending to follow    Candida Leiva PA-C,   Department of Medicine

## 2023-08-25 NOTE — CONSULT NOTE ADULT - SUBJECTIVE AND OBJECTIVE BOX
HPI:  58-year-old female past medical history hyperlipidemia, thyroid cancer status post thyroidectomy 1 month ago p/w fever, generalized weakness and nausea x 4 days.   Patient states that she has been having fevers/ felt weak and passed out this morning.   No head trauma. No hx chest pain/ plapitations/ SOB.   Patient without any urinary or fecal incontinence after episode.  Patient reports an episode of vomiting after syncope.   (24 Aug 2023 23:00)    Reason for consult: Thyroid cancer, CNS mets    HPI:  The patient is a 58-year-old female with a history of recently diagnosed thyroid cancer, who underwent surgery on 6/12/2023, with total thyroidectomy, central and lateral neck dissection at [facility].  Surgical pathology showed multifocal PTC, main lesion 1.2 cm and additional foci of microcarcinoma.  Lymphatic invasion and perineural invasion identified, gross extrathyroidal extension identified to strap muscles.  2/30 lymph nodes positive for metastatic thyroid cancer, largest 1.7 cm, level 2.  2015 EMILEE risk: High  AJCC eighth edition: [id number]    She has been taking 100 mcg levothyroxine after surgery.  She was planned for pretherapy NICHOLS scans this week, received Thyrogen injections on 8/21 and 8/22, received diagnostic scan dose of radioactive iodine on 8/23.  She started feeling nauseous and had vomiting on 8/23.  On 8/24, she had an episode of syncope and hit her face, which led to the ER visit.    Noncontrast CT of the head showed a large area of vasogenic edema, suspicious for metastatic disease.  Also noted to have iodine avid disease in the right frontal lobe, residual disease in the thyroid bed.         PAST MEDICAL & SURGICAL HISTORY:  Left breast mass      H/O benign breast biopsy  Left, 2015          FAMILY HISTORY:      Social History:  Tobacco  ETOH  Illicits  Occupation    Outpatient Medications:    MEDICATIONS  (STANDING):  levothyroxine 100 MICROGram(s) Oral daily  ondansetron Injectable 4 milliGRAM(s) IV Push every 6 hours    MEDICATIONS  (PRN):  acetaminophen     Tablet .. 650 milliGRAM(s) Oral every 6 hours PRN Temp greater or equal to 38C (100.4F)      Allergies    No Known Allergies    Intolerances      Review of Systems:  Constitutional: No fever, good appetite/po intake  Eyes: No blurry vision, diplopia  Neuro: No tremors  Cardiovascular: No chest pain, palpitations  Respiratory: No SOB, no cough  GI: +nausea, vomiting,   : No dysuria, hematuria  Skin: no rash  Psych: no depression  Endocrine: no polyuria, polydipsia  Hem/lymph: no swelling  Osteoporosis: no fractures    ALL OTHER SYSTEMS REVIEWED AND NEGATIVE    UNABLE TO OBTAIN    PHYSICAL EXAM:  VITALS: T(C): 37.1 (08-25-23 @ 12:27)  T(F): 98.8 (08-25-23 @ 12:27), Max: 101.9 (08-24-23 @ 23:16)  HR: 85 (08-25-23 @ 12:27) (70 - 98)  BP: 103/66 (08-25-23 @ 12:27) (66/55 - 119/58)  RR:  (18 - 24)  SpO2:  (95% - 100%)  Wt(kg): --  GENERAL: NAD, well-groomed, well-developed  EYES: No proptosis, no lid lag, anicteric  HEENT:  Atraumatic, Normocephalic, moist mucous membranes  RESPIRATORY: Clear to auscultation bilaterally; No rales, rhonchi, wheezing, or rubs  CARDIOVASCULAR: Regular rate and rhythm; No murmurs; no peripheral edema  SKIN: Dry, intact, No rashes or lesions  NEURO: sensation intact, extraocular movements intact, no tremor, normal reflexes  PSYCH: reactive affect, euthymic mood  CUSHING'S SIGNS: no striae                              11.2   3.86  )-----------( 169      ( 25 Aug 2023 11:22 )             34.1       08-25    139  |  108  |  8   ----------------------------<  75  3.3<L>   |  17<L>  |  0.63    eGFR: 103    Ca    8.2<L>      08-25    TPro  7.0  /  Alb  3.9  /  TBili  0.9  /  DBili  x   /  AST  75<H>  /  ALT  49<H>  /  AlkPhos  97  08-24      Thyroid Function Tests:  08-24 @ 12:09 TSH 21.80 FreeT4 -- T3 86 Anti TPO -- Anti Thyroglobulin Ab -- TSI --

## 2023-08-25 NOTE — CONSULT NOTE ADULT - ASSESSMENT
The patient is a 58-year-old female with a history of recently diagnosed thyroid cancer, who underwent surgery on 6/12/2023, with total thyroidectomy, central and lateral neck dissection at [facility]. Path showe EMILEE high risk cancer. She was planned for pretherapy NICHOLS scans this week, received Thyrogen injections and diagnostic scan dose of radioactive iodine this week, then was admitted due to fever, generalized weakness and LOC. CT of head showed vasogenic edema is noted in the anterior, inferior right frontal lobe suspicious for metastatic disease. NICHOLS scan also showed left thyroid bed remnant, minimal right thyroid bed residual disease, and iodine avid left retropharyngeal space and left lateral neck compartment disease, and also Iodine avid right frontal lobe metastasis.    -Recommend neurosurgery consult   -Recommend MRI of brain   -Med Onc and Endocrinology rec appreciated.  Will follow up on neurosurgery opinion and MRI results.      The patient is a 58-year-old female with a history of recently diagnosed thyroid cancer, who underwent surgery on 6/12/2023, with total thyroidectomy, central and lateral neck dissection at [facility]. 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 She was planned for pretherapy NICHOLS scans this week, received Thyrogen injections and diagnostic scan dose of radioactive iodine this week, then was admitted due to fever, generalized weakness and LOC. CT of head showed vasogenic edema is noted in the anterior, inferior right frontal lobe suspicious for metastatic disease. NICHOLS scan also showed left thyroid bed remnant, minimal right thyroid bed residual disease, and iodine avid left retropharyngeal space and left lateral neck compartment disease, and also Iodine avid right frontal lobe metastasis.    -Recommend neurosurgery consult   -Recommend MRI of brain   -Med Onc and Endocrinology rec appreciated.  Will follow up on neurosurgery opinion and MRI results.

## 2023-08-25 NOTE — CONSULT NOTE ADULT - SUBJECTIVE AND OBJECTIVE BOX
HPI:  The patient is a 58-year-old female with a history of recently diagnosed thyroid cancer, who underwent surgery on 6/12/2023, with total thyroidectomy, central and lateral neck dissection at [facility].  Surgical pathology showed multifocal PTC, main lesion 1.2 cm and additional foci of microcarcinoma.  Lymphatic invasion and perineural invasion identified, gross extrathyroidal extension identified to strap muscles.  2/30 lymph nodes positive for metastatic thyroid cancer, largest 1.7 cm, level 2.  2015 EMILEE risk: High  AJCC eighth edition: [id number]    She has been taking 100 mcg levothyroxine after surgery.  She was planned for pretherapy NICHOLS scans this week, received Thyrogen injections on 8/21 and 8/22, received diagnostic scan dose of radioactive iodine on 8/23.  She started feeling nauseous and had vomiting on 8/23.  On 8/24, she had an episode of syncope and hit her face, which led to the ER visit.    Noncontrast CT of the head showed a large area of vasogenic edema, suspicious for metastatic disease.  Also noted to have iodine avid disease in the right frontal lobe, residual disease in the thyroid bed.         PAST MEDICAL & SURGICAL HISTORY:  Left breast mass      H/O benign breast biopsy  Left, 2015          FAMILY HISTORY:      Social History:  Tobacco  ETOH  Illicits  Occupation    Outpatient Medications:    MEDICATIONS  (STANDING):  levothyroxine 100 MICROGram(s) Oral daily  ondansetron Injectable 4 milliGRAM(s) IV Push every 6 hours    MEDICATIONS  (PRN):  acetaminophen     Tablet .. 650 milliGRAM(s) Oral every 6 hours PRN Temp greater or equal to 38C (100.4F)      Allergies    No Known Allergies    Intolerances      Review of Systems:  Constitutional: No fever, good appetite/po intake  Eyes: No blurry vision, diplopia  Neuro: No tremors  Cardiovascular: No chest pain, palpitations  Respiratory: No SOB, no cough  GI: +nausea, vomiting,   : No dysuria, hematuria  Skin: no rash  Psych: no depression  Endocrine: no polyuria, polydipsia  Hem/lymph: no swelling  Osteoporosis: no fractures    ALL OTHER SYSTEMS REVIEWED AND NEGATIVE    UNABLE TO OBTAIN    PHYSICAL EXAM:  VITALS: T(C): 37.1 (08-25-23 @ 12:27)  T(F): 98.8 (08-25-23 @ 12:27), Max: 101.9 (08-24-23 @ 23:16)  HR: 85 (08-25-23 @ 12:27) (70 - 98)  BP: 103/66 (08-25-23 @ 12:27) (66/55 - 119/58)  RR:  (18 - 24)  SpO2:  (95% - 100%)  Wt(kg): --  GENERAL: NAD, well-groomed, well-developed  EYES: No proptosis, no lid lag, anicteric  HEENT:  Atraumatic, Normocephalic, moist mucous membranes  RESPIRATORY: Clear to auscultation bilaterally; No rales, rhonchi, wheezing, or rubs  CARDIOVASCULAR: Regular rate and rhythm; No murmurs; no peripheral edema  SKIN: Dry, intact, No rashes or lesions  NEURO: sensation intact, extraocular movements intact, no tremor, normal reflexes  PSYCH: reactive affect, euthymic mood  CUSHING'S SIGNS: no striae                            11.2   3.86  )-----------( 169      ( 25 Aug 2023 11:22 )             34.1       08-25    139  |  108  |  8   ----------------------------<  75  3.3<L>   |  17<L>  |  0.63    eGFR: 103    Ca    8.2<L>      08-25    TPro  7.0  /  Alb  3.9  /  TBili  0.9  /  DBili  x   /  AST  75<H>  /  ALT  49<H>  /  AlkPhos  97  08-24      Thyroid Function Tests:  08-24 @ 12:09 TSH 21.80 FreeT4 -- T3 86 Anti TPO -- Anti Thyroglobulin Ab -- TSI --

## 2023-08-25 NOTE — PROVIDER CONTACT NOTE (OTHER) - ASSESSMENT
patient rates chest pain 4/10 on the left side of chest. patient tolerating room air. VS as noted. patient rates chest pain 4/10 on the left side of chest. patient tolerating room air. Upon taking vitals, patient's BP was found to be 99/63. patient currently on NS going at 100mL/hr.

## 2023-08-25 NOTE — CONSULT NOTE ADULT - ASSESSMENT
58F Rhanded h/o thyroid CA s/p thyroidectomy 2mo ago, p/f gen weakness/fever/nausea s/p 1st dose NICHOLS. NM scan 8/24 w/ iodine-avid R inf med frontal lesion c/f met, no other lesions. CTH w/ 1.7cm R frontal lesion w/ sig vasogenic edema + addtl partial calcified lesion inferiorly. Exam: Intact, no drift, FS, MURGUIA 5/5, SILT.  - Admitted medicine  - MRI stereo w/wo + DTI  - Dex 10 then 4q6  - 1g Keppra BID  - Already scheduled for MR abdomen w/wo per onc  - Preop for R crani for resection vs. consider for SRS (pt knowledgeable & would prefer min invasive tx if possible)  - Please obtain med clearance for possible OR

## 2023-08-25 NOTE — CONSULT NOTE ADULT - SUBJECTIVE AND OBJECTIVE BOX
HEMATOLOGY ONCOLOGY CONSULT     Patient is a 58y old  Female who presents with a chief complaint of Fever x 4 days (25 Aug 2023 08:20)      HPI:  58-year-old female past medical history hyperlipidemia, thyroid cancer status post thyroidectomy 1 month ago p/w fever, generalized weakness and nausea x 4 days.   Patient states that she has been having fevers/ felt weak and passed out this morning.   No head trauma. No hx chest pain/ plapitations/ SOB.   Patient without any urinary or fecal incontinence after episode.  Patient reports an episode of vomiting after syncope.   (24 Aug 2023 23:00)       ROS negative except as indicated in the HPI.    PAST MEDICAL & SURGICAL HISTORY:  Left breast mass      H/O benign breast biopsy  Left, 2015          SOCIAL HISTORY:    FAMILY HISTORY:      MEDICATIONS  (STANDING):  levothyroxine 100 MICROGram(s) Oral daily  ondansetron Injectable 4 milliGRAM(s) IV Push every 6 hours    MEDICATIONS  (PRN):  acetaminophen     Tablet .. 650 milliGRAM(s) Oral every 6 hours PRN Temp greater or equal to 38C (100.4F)      Allergies    No Known Allergies    Intolerances        Vital Signs Last 24 Hrs  T(C): 36.9 (25 Aug 2023 10:03), Max: 38.8 (24 Aug 2023 23:16)  T(F): 98.4 (25 Aug 2023 10:03), Max: 101.9 (24 Aug 2023 23:16)  HR: 77 (25 Aug 2023 10:03) (70 - 98)  BP: 89/56 (25 Aug 2023 10:03) (66/55 - 119/58)  BP(mean): 65 (24 Aug 2023 23:16) (62 - 91)  RR: 19 (25 Aug 2023 10:03) (18 - 24)  SpO2: 98% (25 Aug 2023 10:03) (95% - 100%)    Parameters below as of 25 Aug 2023 10:03  Patient On (Oxygen Delivery Method): room air        PHYSICAL EXAM  General: adult in NAD  HEENT: clear oropharynx, anicteric sclera, pink conjunctiva  Neck: supple  CV: normal S1/S2 with no murmur rubs or gallops  Lungs: positive air movement b/l ant lungs, clear to auscultation, no wheezes, no rales  Abdomen: soft non-tender non-distended, no hepatosplenomegaly  Ext: no clubbing cyanosis or edema  Skin: no rashes and no petechiae  Neuro: alert and oriented X 4, no focal deficits      LABS:                          11.2   3.86  )-----------( 169      ( 25 Aug 2023 11:22 )             34.1         Mean Cell Volume : 84.8 fl  Mean Cell Hemoglobin : 27.9 pg  Mean Cell Hemoglobin Concentration : 32.8 gm/dL  Auto Neutrophil # : x  Auto Lymphocyte # : x  Auto Monocyte # : x  Auto Eosinophil # : x  Auto Basophil # : x  Auto Neutrophil % : x  Auto Lymphocyte % : x  Auto Monocyte % : x  Auto Eosinophil % : x  Auto Basophil % : x      08-25    139  |  108  |  8   ----------------------------<  75  3.3<L>   |  17<L>  |  0.63    Ca    8.2<L>      25 Aug 2023 07:01    TPro  7.0  /  Alb  3.9  /  TBili  0.9  /  DBili  x   /  AST  75<H>  /  ALT  49<H>  /  AlkPhos  97  08-24          PT/INR - ( 24 Aug 2023 12:09 )   PT: 12.7 sec;   INR: 1.22 ratio         PTT - ( 24 Aug 2023 12:09 )  PTT:30.0 sec

## 2023-08-25 NOTE — CONSULT NOTE ADULT - ASSESSMENT
The patient is a 58-year-old female with a history of recently diagnosed thyroid cancer, who underwent surgery on 6/12/2023, with total thyroidectomy, central and lateral neck dissection at [facility]. Now with suspected CNS metastases.    1.  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  AJCC [id number].    -Agree with oncology consult, discussed patient care with Dr. Yeung  -Recommend neurosurgery consult to see if CNS lesion is potentially resectable  -Recommend rad-onc consult for potential treatment options   -Recommend TSH suppression as below, goal TSH <0.1  -We will try to obtain NGS panel from surgical pathology outpatient, send Guardant test per heme-onc  -MRI brain for better characterization of CNS metastases  -Any plan for radioactive iodine therapy would be after potential resection of CNS metastasis if it is possible. If planned, would plan to give steroids during treatment with radioactive iodine given possibility of cerebral edema/seizures with therapy.  This would be planned for outpatient, she will follow up with me outpatient.    2.  Postsurgical hypothyroidism  TSH goal <0.1 given EMILEE high risk cancer   -Resume levothyroxine 100 mcg daily  -Recheck TFTs in 4 to 6 weeks, as an outpatient    Discharge planning: She will see me for outpatient follow-up at endocrinology clinic.  Please call/email me with any questions regarding endocrinology plan.    [email or web address]  Cell: [phone] or MAIRA Patiño MD  Attending Physician   Division of Endocrinology, Diabetes and Metabolism   9AM-5PM: Please contact via [employer] 9-5: [email or web address]  After hours and weekends/holidays: [phone]  Please note that this patient may be followed by a different provider tomorrow.   For final dc reccomendations, please call [phone] or page the endocrine fellow on call.  Assistance with non-urgent matters: [email or web address]

## 2023-08-25 NOTE — PATIENT PROFILE ADULT - FALL HARM RISK - HARM RISK INTERVENTIONS

## 2023-08-25 NOTE — CONSULT NOTE ADULT - SUBJECTIVE AND OBJECTIVE BOX
Cardiovascular Disease Initial Evaluation  Date of service: 08-25-23 @ 08:21    CHIEF COMPLAINT: Fever    HISTORY OF PRESENT ILLNESS:  58-year-old female past medical history hyperlipidemia, thyroid cancer status post thyroidectomy 1 month ago p/w fever, generalized weakness and nausea x 4 days.   Patient has been having fevers/ felt weak and passed out this morning while using the bathroom. Pt states her symptoms began earlier this week after receiving Iodine injections for the first time on Monday then again on Tuesday. She was supposed to receive a third but was unable to tolerate it. Pt overnight began to complain of chest pain. Cardiology consulted for further management. Pt currently undergoing echo. Chest pain free.   Patient without any urinary or fecal incontinence after episode.  Patient reports an episode of vomiting after syncope.          Allergies    No Known Allergies    Intolerances    	    MEDICATIONS:        acetaminophen     Tablet .. 650 milliGRAM(s) Oral every 6 hours PRN  ondansetron Injectable 4 milliGRAM(s) IV Push every 6 hours      levothyroxine 100 MICROGram(s) Oral daily    sodium chloride 0.9%. 1000 milliLiter(s) IV Continuous <Continuous>      PAST MEDICAL & SURGICAL HISTORY:  Left breast mass      H/O benign breast biopsy  Left, 2015          FAMILY HISTORY:      SOCIAL HISTORY:    The patient is a nonsmoker       REVIEW OF SYSTEMS:  See HPI, otherwise complete 14 point review of systems negative    [ x] All others negative	  [ ] Unable to obtain    PHYSICAL EXAM:  T(C): 37 (08-25-23 @ 04:19), Max: 38.8 (08-24-23 @ 23:16)  HR: 76 (08-25-23 @ 04:19) (70 - 98)  BP: 99/63 (08-25-23 @ 04:19) (66/55 - 119/58)  RR: 18 (08-25-23 @ 04:19) (18 - 24)  SpO2: 95% (08-25-23 @ 04:19) (95% - 100%)  Wt(kg): --  I&O's Summary      Appearance: No Acute Distress; resting comfortably  HEENT:  Normal oral mucosa, PERRL, EOMI	  Cardiovascular: Normal S1 S2, No JVD, No murmurs/rubs/gallops  Respiratory: Normal respiratory effort; Lungs clear to auscultation bilaterally  Gastrointestinal:  Soft, Non-tender, + BS	  Skin: No rashes, No ecchymoses, No cyanosis	  Neurologic: Non-focal; no weakness  Extremities: No clubbing, cyanosis or edema  Vascular: Peripheral pulses palpable 2+ bilaterally  Psychiatry: A & O x 3, Mood & affect appropriate    Laboratory Data:	 	    CBC Full  -  ( 25 Aug 2023 07:01 )  WBC Count : 3.79 K/uL  Hemoglobin : 11.1 g/dL  Hematocrit : 34.0 %  Platelet Count - Automated : 176 K/uL  Mean Cell Volume : 85.4 fl  Mean Cell Hemoglobin : 27.9 pg  Mean Cell Hemoglobin Concentration : 32.6 gm/dL  Auto Neutrophil # : x  Auto Lymphocyte # : x  Auto Monocyte # : x  Auto Eosinophil # : x  Auto Basophil # : x  Auto Neutrophil % : x  Auto Lymphocyte % : x  Auto Monocyte % : x  Auto Eosinophil % : x  Auto Basophil % : x    08-24    138  |  102  |  13  ----------------------------<  108<H>  3.5   |  23  |  0.72    Ca    8.9      24 Aug 2023 12:09    TPro  7.0  /  Alb  3.9  /  TBili  0.9  /  DBili  x   /  AST  75<H>  /  ALT  49<H>  /  AlkPhos  97  08-24      proBNP:   Lipid Profile:   HgA1c:   TSH: Thyroid Stimulating Hormone, Serum: 21.80 uIU/mL (08-24 @ 12:09)        CARDIAC MARKERS:            Interpretation of Telemetry: Sinus	    ECG:  Sinus with iRBBB  RADIOLOGY:  OTHER: 	    PREVIOUS DIAGNOSTIC TESTING:    [ ] Echocardiogram:  [ ] Catheterization:  [ ] Stress Test:  	    Assessment:   58-year-old female past medical history hyperlipidemia, thyroid cancer status post thyroidectomy 1 month ago p/w fever, generalized weakness and nausea x 4 days.     Plan of Care:      #Syncope  - R/o cardiogenic etiology  - Likely 2/2 hypovolemia in setting of fever and vomiting  - Please check orthostatics  - Echo is pendnig  - EKG shows sinus rhythm with IRBBB and no acute ischemic changes  - I would also check a D dimer, and if elevated would recommend a CTA chest to rule out PE    #Chest pain  - ACS ruled out  - Echo pending  - Refer to plan above      #HLD  - Statin therapy    #hypothyroidism  - Synthroid        72 minutes spent on total encounter; more than 50% of the visit was spent counseling and/or coordinating care by the attending physician.   	  Hamilton Macario, DO FACC  Cardiovascular Diseases  [phone]

## 2023-08-26 LAB
ANION GAP SERPL CALC-SCNC: 11 MMOL/L — SIGNIFICANT CHANGE UP (ref 5–17)
BUN SERPL-MCNC: 8 MG/DL — SIGNIFICANT CHANGE UP (ref 7–23)
CALCIUM SERPL-MCNC: 8.6 MG/DL — SIGNIFICANT CHANGE UP (ref 8.4–10.5)
CHLORIDE SERPL-SCNC: 110 MMOL/L — HIGH (ref 96–108)
CO2 SERPL-SCNC: 19 MMOL/L — LOW (ref 22–31)
CREAT SERPL-MCNC: 0.63 MG/DL — SIGNIFICANT CHANGE UP (ref 0.5–1.3)
CULTURE RESULTS: SIGNIFICANT CHANGE UP
E COLI DNA BLD POS QL NAA+NON-PROBE: SIGNIFICANT CHANGE UP
EGFR: 103 ML/MIN/1.73M2 — SIGNIFICANT CHANGE UP
GLUCOSE BLDC GLUCOMTR-MCNC: 111 MG/DL — HIGH (ref 70–99)
GLUCOSE BLDC GLUCOMTR-MCNC: 254 MG/DL — HIGH (ref 70–99)
GLUCOSE SERPL-MCNC: 83 MG/DL — SIGNIFICANT CHANGE UP (ref 70–99)
GRAM STN FLD: SIGNIFICANT CHANGE UP
METHOD TYPE: SIGNIFICANT CHANGE UP
POTASSIUM SERPL-MCNC: 4.3 MMOL/L — SIGNIFICANT CHANGE UP (ref 3.5–5.3)
POTASSIUM SERPL-SCNC: 4.3 MMOL/L — SIGNIFICANT CHANGE UP (ref 3.5–5.3)
SODIUM SERPL-SCNC: 140 MMOL/L — SIGNIFICANT CHANGE UP (ref 135–145)
SPECIMEN SOURCE: SIGNIFICANT CHANGE UP

## 2023-08-26 PROCEDURE — 99223 1ST HOSP IP/OBS HIGH 75: CPT | Mod: GC

## 2023-08-26 PROCEDURE — 99233 SBSQ HOSP IP/OBS HIGH 50: CPT

## 2023-08-26 RX ORDER — DEXAMETHASONE 0.5 MG/5ML
10 ELIXIR ORAL ONCE
Refills: 0 | Status: COMPLETED | OUTPATIENT
Start: 2023-08-26 | End: 2023-08-26

## 2023-08-26 RX ORDER — PIPERACILLIN AND TAZOBACTAM 4; .5 G/20ML; G/20ML
3.38 INJECTION, POWDER, LYOPHILIZED, FOR SOLUTION INTRAVENOUS ONCE
Refills: 0 | Status: COMPLETED | OUTPATIENT
Start: 2023-08-26 | End: 2023-08-26

## 2023-08-26 RX ORDER — DEXAMETHASONE 0.5 MG/5ML
10 ELIXIR ORAL ONCE
Refills: 0 | Status: DISCONTINUED | OUTPATIENT
Start: 2023-08-26 | End: 2023-08-26

## 2023-08-26 RX ORDER — PIPERACILLIN AND TAZOBACTAM 4; .5 G/20ML; G/20ML
3.38 INJECTION, POWDER, LYOPHILIZED, FOR SOLUTION INTRAVENOUS EVERY 8 HOURS
Refills: 0 | Status: DISCONTINUED | OUTPATIENT
Start: 2023-08-26 | End: 2023-08-28

## 2023-08-26 RX ORDER — DEXAMETHASONE 0.5 MG/5ML
4 ELIXIR ORAL EVERY 6 HOURS
Refills: 0 | Status: DISCONTINUED | OUTPATIENT
Start: 2023-08-26 | End: 2023-08-28

## 2023-08-26 RX ORDER — ONDANSETRON 8 MG/1
4 TABLET, FILM COATED ORAL EVERY 6 HOURS
Refills: 0 | Status: DISCONTINUED | OUTPATIENT
Start: 2023-08-26 | End: 2023-08-29

## 2023-08-26 RX ORDER — LEVETIRACETAM 250 MG/1
1000 TABLET, FILM COATED ORAL
Refills: 0 | Status: DISCONTINUED | OUTPATIENT
Start: 2023-08-26 | End: 2023-08-27

## 2023-08-26 RX ORDER — ACETAMINOPHEN 500 MG
650 TABLET ORAL EVERY 6 HOURS
Refills: 0 | Status: DISCONTINUED | OUTPATIENT
Start: 2023-08-26 | End: 2023-08-29

## 2023-08-26 RX ADMIN — PIPERACILLIN AND TAZOBACTAM 200 GRAM(S): 4; .5 INJECTION, POWDER, LYOPHILIZED, FOR SOLUTION INTRAVENOUS at 13:56

## 2023-08-26 RX ADMIN — Medication 650 MILLIGRAM(S): at 12:45

## 2023-08-26 RX ADMIN — Medication 102 MILLIGRAM(S): at 16:58

## 2023-08-26 RX ADMIN — Medication 100 MICROGRAM(S): at 06:01

## 2023-08-26 RX ADMIN — PIPERACILLIN AND TAZOBACTAM 25 GRAM(S): 4; .5 INJECTION, POWDER, LYOPHILIZED, FOR SOLUTION INTRAVENOUS at 16:59

## 2023-08-26 RX ADMIN — ONDANSETRON 4 MILLIGRAM(S): 8 TABLET, FILM COATED ORAL at 15:49

## 2023-08-26 RX ADMIN — Medication 650 MILLIGRAM(S): at 11:15

## 2023-08-26 NOTE — CONSULT NOTE ADULT - ASSESSMENT
WORK UP:      ANTIBIOTIC:      DIAGNOSIS and IMPRESSION:        RECOMMENDATIONS:        Above recommendations are Preliminary until Attending's Addendum which includes Final Recommendations      Jerzy Ray DO, PGY-5   ID fellow  [employer] Preferred  After 5pm/weekends call [phone]   58F with hyperlipidemia, thyroid cancer status post thyroidectomy 1 month ago presents (8/24) with fever and generalized weakness, found to be febrile 101F, no leukocytosis, CTH with large vasogenic edema in the anterior and inferior R frontal lobe concerning for metastatic disease, complicated by on going fevers (8/25), found to have GNR bacteremia Ecoli PCR positive, ID consulted for assistance.    Denies any abdominal pain, no dysuria, diarrhea  Rest of ROS is fairly benign    DIAGNOSIS and IMPRESSION:  #GNR Bacteremia  #Abnormal CTH concerning for Metastatic Disease  #Hx of Thyroid Cancer s/p surgery    - unclear source of bacteremia, no localizing symptoms  - no dysuria, abdominal pain, diarrhea    RECOMMENDATIONS:  - continue zosyn for now  - monitor temperature curve  - trend WBC  - obtain BCx x2  - obtain CT AP preferable IV contrast    Case d/w attending and primary team        Jerzy Ray DO, PGY-5   ID fellow  [employer] Preferred  After 5pm/weekends call [phone]

## 2023-08-26 NOTE — CONSULT NOTE ADULT - CONSULT REQUESTED DATE/TIME
25-Aug-2023 14:10
25-Aug-2023 08:20
25-Aug-2023 12:03
26-Aug-2023 12:29
25-Aug-2023 14:32
25-Aug-2023 13:21

## 2023-08-26 NOTE — CONSULT NOTE ADULT - SUBJECTIVE AND OBJECTIVE BOX
Patient is a 58y old  Female who presents with a chief complaint of Fever x 4 days (26 Aug 2023 10:23)    HPI:  58F with hyperlipidemia, thyroid cancer status post thyroidectomy 1 month ago presents (8/24) with fever and generalized weakness, found to be febrile 101F, no leukocytosis, CTH with large vasogenic edema in the anterior and inferior R frontal lobe concerning for metastatic disease, complicated by on going fevers (8/25), found to have GNR bacteremia Ecoli PCR positive, ID consulted for assistance.    Patient ---           prior hospital charts reviewed [  ]  primary team notes reviewed [  ]  other consultant notes reviewed [  ]    PAST MEDICAL & SURGICAL HISTORY:  Left breast mass      H/O benign breast biopsy  Left, 2015          Allergies  No Known Allergies    ANTIMICROBIALS (past 90 days)  MEDICATIONS  (STANDING):        piperacillin/tazobactam IVPB. 3.375 once  piperacillin/tazobactam IVPB.- 3.375 once  piperacillin/tazobactam IVPB.. 3.375 every 8 hours    MEDICATIONS  (STANDING):  acetaminophen     Tablet .. 650 every 6 hours PRN  dexAMETHasone  Injectable 4 every 6 hours  dexAMETHasone  IVPB 10 once  levETIRAcetam 1000 two times a day  levothyroxine 100 daily  ondansetron Injectable 4 every 6 hours    SOCIAL HISTORY:       FAMILY HISTORY:    REVIEW OF SYSTEMS  [  ] ROS unobtainable because:    [  ] All other systems negative except as noted below:	    Constitutional:  [ ] fever [ ] chills  [ ] weight loss  [ ] weakness  Skin:  [ ] rash [ ] phlebitis	  Eyes: [ ] icterus [ ] pain  [ ] discharge	  ENMT: [ ] sore throat  [ ] thrush [ ] ulcers [ ] exudates  Respiratory: [ ] dyspnea [ ] hemoptysis [ ] cough [ ] sputum	  Cardiovascular:  [ ] chest pain [ ] palpitations [ ] edema	  Gastrointestinal:  [ ] nausea [ ] vomiting [ ] diarrhea [ ] constipation [ ] pain	  Genitourinary:  [ ] dysuria [ ] frequency [ ] hematuria [ ] discharge [ ] flank pain  [ ] incontinence  Musculoskeletal:  [ ] myalgias [ ] arthralgias [ ] arthritis  [ ] back pain  Neurological:  [ ] headache [ ] seizures  [ ] confusion/altered mental status  Psychiatric:  [ ] anxiety [ ] depression	  Hematology/Lymphatics:  [ ] lymphadenopathy  Endocrine:  [ ] adrenal [ ] thyroid  Allergic/Immunologic:	 [ ] transplant [ ] seasonal    Vital Signs Last 24 Hrs  T(F): 98.3 (08-26-23 @ 11:54), Max: 101.9 (08-24-23 @ 23:16)  Vital Signs Last 24 Hrs  HR: 67 (08-26-23 @ 11:54) (59 - 79)  BP: 108/70 (08-26-23 @ 11:54) (94/57 - 113/74)  RR: 18 (08-26-23 @ 11:54)  SpO2: 96% (08-26-23 @ 11:54) (95% - 98%)  Wt(kg): --    PHYSICAL EXAM:                              11.2   3.86  )-----------( 169      ( 25 Aug 2023 11:22 )             34.1   08-26    140  |  110<H>  |  8   ----------------------------<  83  4.3   |  19<L>  |  0.63    Ca    8.6      26 Aug 2023 06:30      Urinalysis Basic - ( 26 Aug 2023 06:30 )    Color: x / Appearance: x / SG: x / pH: x  Gluc: 83 mg/dL / Ketone: x  / Bili: x / Urobili: x   Blood: x / Protein: x / Nitrite: x   Leuk Esterase: x / RBC: x / WBC x   Sq Epi: x / Non Sq Epi: x / Bacteria: x    MICROBIOLOGY:  Culture - Urine (collected 24 Aug 2023 14:31)  Source: Clean Catch Clean Catch (Midstream)  Preliminary Report (26 Aug 2023 08:15):    10,000 - 49,000 CFU/mL Gram positive organisms    Culture - Blood (collected 24 Aug 2023 10:55)  Source: .Blood Blood-Peripheral  Gram Stain (26 Aug 2023 10:09):    Growth in anaerobic bottle: Gram Negative Rods  Preliminary Report (26 Aug 2023 10:10):    Growth in anaerobic bottle: Gram Negative Rods    Direct identification is available within approximately 3-5    hours either by Blood Panel Multiplexed PCR or Direct    MALDI-TOF. Details: [email or web address]  Organism: Blood Culture PCR (26 Aug 2023 12:01)  Organism: Blood Culture PCR (26 Aug 2023 12:01)      Method Type: PCR      -  Escherichia coli: Detec    Culture - Blood (collected 24 Aug 2023 10:10)  Source: .Blood Blood-Peripheral  Preliminary Report (25 Aug 2023 17:02):    No growth at 24 hours      Rapid RVP Result: NotDetec (08-24 @ 12:07)      RADIOLOGY:  imaging below personally reviewed and agree with findings  < from: CT Head No Cont (08.24.23 @ 18:51) >  BRAIN  IMPRESSION:    1)  a large area of vasogenic edema is noted in the anterior, inferior   right frontal lobe suspicious for metastatic disease. Also noted is a   partially calcified lesion in the region of the right anterior clinoid.   MR imaging of the brain recommended with without gadolinium for further   assessment and characterization.  2)  no intracerebral hemorrhage, contusion, or collections identified.      FACIAL BONES IMPRESSION :    No acute facial bone fracture identified.    < end of copied text >  < from: Xray Chest 1 View- PORTABLE-Urgent (08.24.23 @ 12:38) >  IMPRESSION:  Clear lungs.    < end of copied text >   Patient is a 58y old  Female who presents with a chief complaint of Fever x 4 days (26 Aug 2023 10:23)    HPI:  58F with hyperlipidemia, thyroid cancer status post thyroidectomy 1 month ago presents (8/24) with fever and generalized weakness, found to be febrile 101F, no leukocytosis, CTH with large vasogenic edema in the anterior and inferior R frontal lobe concerning for metastatic disease, complicated by on going fevers (8/25), found to have GNR bacteremia Ecoli PCR positive, ID consulted for assistance.    Denies any abdominal pain, no dysuria, diarrhea  Rest of ROS is fairly benign    prior hospital charts reviewed [ x ]  primary team notes reviewed [ x ]  other consultant notes reviewed [x  ]    PAST MEDICAL & SURGICAL HISTORY:  Left breast mass      H/O benign breast biopsy  Left, 2015          Allergies  No Known Allergies    ANTIMICROBIALS (past 90 days)  MEDICATIONS  (STANDING):        piperacillin/tazobactam IVPB. 3.375 once  piperacillin/tazobactam IVPB.- 3.375 once  piperacillin/tazobactam IVPB.. 3.375 every 8 hours    MEDICATIONS  (STANDING):  acetaminophen     Tablet .. 650 every 6 hours PRN  dexAMETHasone  Injectable 4 every 6 hours  dexAMETHasone  IVPB 10 once  levETIRAcetam 1000 two times a day  levothyroxine 100 daily  ondansetron Injectable 4 every 6 hours    SOCIAL HISTORY:   Denies alcohol, tobacco, recreational drug use      FAMILY HISTORY: HTN    REVIEW OF SYSTEMS  [  ] ROS unobtainable because:    [x  ] All other systems negative except as noted below:	    Constitutional:  [x ] fever [ ] chills  [ ] weight loss  [ x] weakness  Skin:  [ ] rash [ ] phlebitis	  Eyes: [ ] icterus [ ] pain  [ ] discharge	  ENMT: [ ] sore throat  [ ] thrush [ ] ulcers [ ] exudates  Respiratory: [ ] dyspnea [ ] hemoptysis [ ] cough [ ] sputum	  Cardiovascular:  [ ] chest pain [ ] palpitations [ ] edema	  Gastrointestinal:  [ ] nausea [ ] vomiting [ ] diarrhea [ ] constipation [ ] pain	  Genitourinary:  [ ] dysuria [ ] frequency [ ] hematuria [ ] discharge [ ] flank pain  [ ] incontinence  Musculoskeletal:  [ ] myalgias [ ] arthralgias [ ] arthritis  [ ] back pain  Neurological:  [ ] headache [ ] seizures  [ ] confusion/altered mental status  Psychiatric:  [ ] anxiety [ ] depression	  Hematology/Lymphatics:  [ ] lymphadenopathy  Endocrine:  [ ] adrenal [ ] thyroid  Allergic/Immunologic:	 [ ] transplant [ ] seasonal    Vital Signs Last 24 Hrs  T(F): 98.3 (08-26-23 @ 11:54), Max: 101.9 (08-24-23 @ 23:16)  Vital Signs Last 24 Hrs  HR: 67 (08-26-23 @ 11:54) (59 - 79)  BP: 108/70 (08-26-23 @ 11:54) (94/57 - 113/74)  RR: 18 (08-26-23 @ 11:54)  SpO2: 96% (08-26-23 @ 11:54) (95% - 98%)  Wt(kg): --    Physical Exam:  Constitutional:  well preserved, comfortable  Head/Eyes: no icterus  ENT:  supple, no cervical lymphadenopathy   LUNGS:  CTA  CVS:  regular rhythm, no murmur  Abd:  soft, non-tender; non-distended  Ext:  no edema  Vascular:  IV site no erythema tenderness or discharge  MSK:  joints without swelling  Neuro: AAO X 3, non- focal                                11.2   3.86  )-----------( 169      ( 25 Aug 2023 11:22 )             34.1   08-26    140  |  110<H>  |  8   ----------------------------<  83  4.3   |  19<L>  |  0.63    Ca    8.6      26 Aug 2023 06:30      Urinalysis Basic - ( 26 Aug 2023 06:30 )    Color: x / Appearance: x / SG: x / pH: x  Gluc: 83 mg/dL / Ketone: x  / Bili: x / Urobili: x   Blood: x / Protein: x / Nitrite: x   Leuk Esterase: x / RBC: x / WBC x   Sq Epi: x / Non Sq Epi: x / Bacteria: x    MICROBIOLOGY:  Culture - Urine (collected 24 Aug 2023 14:31)  Source: Clean Catch Clean Catch (Midstream)  Preliminary Report (26 Aug 2023 08:15):    10,000 - 49,000 CFU/mL Gram positive organisms    Culture - Blood (collected 24 Aug 2023 10:55)  Source: .Blood Blood-Peripheral  Gram Stain (26 Aug 2023 10:09):    Growth in anaerobic bottle: Gram Negative Rods  Preliminary Report (26 Aug 2023 10:10):    Growth in anaerobic bottle: Gram Negative Rods    Direct identification is available within approximately 3-5    hours either by Blood Panel Multiplexed PCR or Direct    MALDI-TOF. Details: [email or web address]  Organism: Blood Culture PCR (26 Aug 2023 12:01)  Organism: Blood Culture PCR (26 Aug 2023 12:01)      Method Type: PCR      -  Escherichia coli: Detec    Culture - Blood (collected 24 Aug 2023 10:10)  Source: .Blood Blood-Peripheral  Preliminary Report (25 Aug 2023 17:02):    No growth at 24 hours      Rapid RVP Result: NotDetec (08-24 @ 12:07)      RADIOLOGY:  imaging below personally reviewed and agree with findings  < from: CT Head No Cont (08.24.23 @ 18:51) >  BRAIN  IMPRESSION:    1)  a large area of vasogenic edema is noted in the anterior, inferior   right frontal lobe suspicious for metastatic disease. Also noted is a   partially calcified lesion in the region of the right anterior clinoid.   MR imaging of the brain recommended with without gadolinium for further   assessment and characterization.  2)  no intracerebral hemorrhage, contusion, or collections identified.      FACIAL BONES IMPRESSION :    No acute facial bone fracture identified.    < end of copied text >  < from: Xray Chest 1 View- PORTABLE-Urgent (08.24.23 @ 12:38) >  IMPRESSION:  Clear lungs.    < end of copied text >

## 2023-08-26 NOTE — CONSULT NOTE ADULT - ATTENDING COMMENTS
58 year old female with thyroid cancer, found to have a right dural based lesion on the anterior skull base floor in the region of the olfactory groove, with adjacent significant cerebral edema, concerning for metastatic lesion, although higher grade meningioma is also a possibility. There is also a more inferior smaller dural based lesion on the sphenoid wing without associated cerebral edema, likely a lower grade meningioma.    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    As the patient is asymptomatic, I recommend Gamma Knife radiosurgery for the larger lesion. Even if this lesion is a meningioma rather than a metastasis, the significant amount of surrounding cerebral edema is concerning for higher grade meningioma, so Gamma Knife would still be an appropriate treatment.    The smaller sphenoid wing meningioma is likely a benign meningioma and thus does not require treatment with Gamma Knife and can be watched with serial imaging.    Risks and benefits of Gamma Knife versus surgical resection versus watchful waiting were discussed.
Mrs. Jacobs is seen for recommendations regarding probable metastatic thyroid cancer. She was evaluated on 8/25 by Dr. Sanderson and discussed with me.  I left amessage for the family and returned today for follow up. She is seen today with her family at bedside and Dr. Lyles in attendance.  She is being treated for a bacteremia in addition to management of vasogenic edema secondary to a frontal brain lesion.  On Mri this is dural bases and there is a second smaller lesion. However, only the dominant lesion picked up iodine on the [place]jas scan.  We have been asked to discuss non invasive or minimally invasive management.  I discussed GammaKnife radiation as an option for treatment for CNS metastasis from thyroid cancer.  The family is concerned that this lesion may not be a metastatic lesion. with that concern, and with surgical removal as an option for management of a cerebral met, we have asked for neurosurgery to follow up regarding surgical management.
58F with hyperlipidemia, thyroid cancer status post thyroidectomy 1 month ago admitted 8/24/23 with fever and generalized weakness, found to be febrile 101F, no leukocytosis, CTH with large vasogenic edema in the anterior and inferior R frontal lobe concerning for metastatic disease, complicated by on going fevers (8/25), found to have GNR bacteremia Ecoli PCR positive, ID consulted for assistance.    Denies any abdominal pain, no dysuria, diarrhea  5/26/23 FNA: "LYMPH NODE", LEVEL 3, RIGHT, US GUIDED FNA: POSITIVE FOR MALIGNANT CELLS.  Papillary thyroid carcinoma.    DIAGNOSIS and IMPRESSION:  E coli Bacteremia  #Abnormal CT Head concerning for Metastatic Disease  #Hx of Thyroid Cancer s/p surgery    - unclear source of bacteremia, no localizing symptoms  - no dysuria, abdominal pain, diarrhea    RECOMMENDATIONS:  Continue Zosyn 8/26 -->  - obtain CT AP preferable IV contrast  repeat blood cultures    I will monitor antimicrobial susceptibilities

## 2023-08-26 NOTE — PROGRESS NOTE ADULT - SUBJECTIVE AND OBJECTIVE BOX
Cardiovascular Disease Progress Note  Date of service: 08-26-23 @ 08:32    Overnight events: No acute events overnight.  Pt denies chest pain. Febrile this overnight with Tmax 101  Otherwise review of systems negative    Objective Findings:  T(C): 36.4 (08-26-23 @ 05:18), Max: 38.3 (08-25-23 @ 21:10)  HR: 65 (08-26-23 @ 05:18) (59 - 85)  BP: 105/67 (08-26-23 @ 05:18) (89/56 - 113/74)  RR: 18 (08-26-23 @ 05:18) (18 - 19)  SpO2: 96% (08-26-23 @ 05:18) (95% - 98%)  Wt(kg): --  Daily     Daily       Physical Exam:  Gen: NAD; Patient resting comfortably  HEENT: EOMI, Normocephalic/ atraumatic  CV: RRR, normal S1 + S2, no m/r/g  Lungs:  Normal respiratory effort; clear to auscultation bilaterally  Abd: soft, non-tender; bowel sounds present  Ext: No edema; warm and well perfused    Telemetry: N/a    Laboratory Data:                        11.2   3.86  )-----------( 169      ( 25 Aug 2023 11:22 )             34.1     08-26    140  |  110<H>  |  8   ----------------------------<  83  4.3   |  19<L>  |  0.63    Ca    8.6      26 Aug 2023 06:30    TPro  7.0  /  Alb  3.9  /  TBili  0.9  /  DBili  x   /  AST  75<H>  /  ALT  49<H>  /  AlkPhos  97  08-24    PT/INR - ( 24 Aug 2023 12:09 )   PT: 12.7 sec;   INR: 1.22 ratio         PTT - ( 24 Aug 2023 12:09 )  PTT:30.0 sec  CARDIAC MARKERS ( 25 Aug 2023 07:01 )  x     / x     / 67 U/L / x     / <1.0 ng/mL          Inpatient Medications:  MEDICATIONS  (STANDING):  levothyroxine 100 MICROGram(s) Oral daily  ondansetron Injectable 4 milliGRAM(s) IV Push every 6 hours      Assessment:  58-year-old female past medical history hyperlipidemia, thyroid cancer status post thyroidectomy 1 month ago p/w fever, generalized weakness and nausea x 4 days.     Plan of Care:      #Syncope  - R/o cardiogenic etiology  - Likely 2/2 hypovolemia in setting of fever and vomiting  - Please check orthostatics  - Echo 8/25/2023 shows normal LV systolic function with no significant structural abnormalities.   - CT head shows vasogenic edema, concern for metastatic disease. Neurosurgery, heme/onc following.   - EKG shows sinus rhythm with IRBBB and no acute ischemic changes    #Thyroid CA  - Possible metastatic disease  - Heme/onc input appreciated.   - MRI pending.     #HLD  - Statin therapy    #hypothyroidism  - Synthroid    #ACP (advance care planning)-  Advanced care planning was discussed with the patient.  Risks, benefits and alternatives of medical treatment and procedures were discussed in detail and all questions were answered. 30 additional minutes spent addressing advance care plans.          Over 25 minutes spent on total encounter; more than 50% of the visit was spent counseling and/or coordinating care by the attending physician.      Hamilton Macario DO [facility]  Cardiovascular Disease  [phone] Cardiovascular Disease Progress Note  Date of service: 08-26-23 @ 08:32    Overnight events: No acute events overnight.  Pt denies chest pain. Febrile this overnight with Tmax 101  Otherwise review of systems negative    Objective Findings:  T(C): 36.4 (08-26-23 @ 05:18), Max: 38.3 (08-25-23 @ 21:10)  HR: 65 (08-26-23 @ 05:18) (59 - 85)  BP: 105/67 (08-26-23 @ 05:18) (89/56 - 113/74)  RR: 18 (08-26-23 @ 05:18) (18 - 19)  SpO2: 96% (08-26-23 @ 05:18) (95% - 98%)  Wt(kg): --  Daily     Daily       Physical Exam:  Gen: NAD; Patient resting comfortably  HEENT: EOMI, Normocephalic/ atraumatic  CV: RRR, normal S1 + S2, no m/r/g  Lungs:  Normal respiratory effort; clear to auscultation bilaterally  Abd: soft, non-tender; bowel sounds present  Ext: No edema; warm and well perfused    Telemetry: sinus    Laboratory Data:                        11.2   3.86  )-----------( 169      ( 25 Aug 2023 11:22 )             34.1     08-26    140  |  110<H>  |  8   ----------------------------<  83  4.3   |  19<L>  |  0.63    Ca    8.6      26 Aug 2023 06:30    TPro  7.0  /  Alb  3.9  /  TBili  0.9  /  DBili  x   /  AST  75<H>  /  ALT  49<H>  /  AlkPhos  97  08-24    PT/INR - ( 24 Aug 2023 12:09 )   PT: 12.7 sec;   INR: 1.22 ratio         PTT - ( 24 Aug 2023 12:09 )  PTT:30.0 sec  CARDIAC MARKERS ( 25 Aug 2023 07:01 )  x     / x     / 67 U/L / x     / <1.0 ng/mL          Inpatient Medications:  MEDICATIONS  (STANDING):  levothyroxine 100 MICROGram(s) Oral daily  ondansetron Injectable 4 milliGRAM(s) IV Push every 6 hours      Assessment:  58-year-old female past medical history hyperlipidemia, thyroid cancer status post thyroidectomy 1 month ago p/w fever, generalized weakness and nausea x 4 days.     Plan of Care:      #Syncope  - R/o cardiogenic etiology  - Likely 2/2 hypovolemia in setting of fever and vomiting  - Please check orthostatics  - Echo 8/25/2023 shows normal LV systolic function with no significant structural abnormalities.   - CT head shows vasogenic edema, concern for metastatic disease. Neurosurgery, heme/onc following.   - EKG shows sinus rhythm with IRBBB and no acute ischemic changes    #Thyroid CA  - Possible metastatic disease  - Heme/onc input appreciated.   - MRI pending.     #HLD  - Statin therapy    #hypothyroidism  - Synthroid    #ACP (advance care planning)-  Advanced care planning was discussed with the patient.  Risks, benefits and alternatives of medical treatment and procedures were discussed in detail and all questions were answered. 30 additional minutes spent addressing advance care plans.          Over 25 minutes spent on total encounter; more than 50% of the visit was spent counseling and/or coordinating care by the attending physician.      Hamilton Macario DO [facility]  Cardiovascular Disease  [phone]

## 2023-08-26 NOTE — PROGRESS NOTE ADULT - SUBJECTIVE AND OBJECTIVE BOX
Patient is a 58y old  Female who presents with a chief complaint of Fever x 4 days (25 Aug 2023 14:31)      SUBJECTIVE / OVERNIGHT EVENTS: no events       T(C): 36.8 (08-26-23 @ 11:54), Max: 36.8 (08-26-23 @ 11:54)  HR: 67 (08-26-23 @ 11:54) (65 - 67)  BP: 108/70 (08-26-23 @ 11:54) (105/67 - 108/70)  RR: 18 (08-26-23 @ 11:54) (18 - 18)  SpO2: 96% (08-26-23 @ 11:54) (96% - 96%)      MEDICATIONS  (STANDING):  dexAMETHasone  Injectable 4 milliGRAM(s) IV Push every 6 hours  dexAMETHasone  IVPB 10 milliGRAM(s) IV Intermittent once  levETIRAcetam 1000 milliGRAM(s) Oral two times a day  levothyroxine 100 MICROGram(s) Oral daily  ondansetron Injectable 4 milliGRAM(s) IV Push every 6 hours  piperacillin/tazobactam IVPB.- 3.375 Gram(s) IV Intermittent once  piperacillin/tazobactam IVPB.. 3.375 Gram(s) IV Intermittent every 8 hours    MEDICATIONS  (PRN):  acetaminophen     Tablet .. 650 milliGRAM(s) Oral every 6 hours PRN Temp greater or equal to 38C (100.4F), Mild Pain (1 - 3)    PHYSICAL EXAM:  GENERAL: NAD, well-developed  HEAD:  Atraumatic, Normocephalic  EYES: EOMI, PERRLA, conjunctiva and sclera clear  NECK: Supple, No JVD  CHEST/LUNG: Clear to auscultation bilaterally; No wheeze  HEART: Regular rate and rhythm; No murmurs, rubs, or gallops  ABDOMEN: Soft, Nontender, Nondistended; Bowel sounds present  EXTREMITIES:  2+ Peripheral Pulses, No clubbing, cyanosis, or edema  PSYCH: AAOx3  NEUROLOGY: non-focal  SKIN: No rashes or lesions                          11.2   3.86  )-----------( 169      ( 25 Aug 2023 11:22 )             34.1       CARDIAC MARKERS ( 25 Aug 2023 07:01 )  x     / x     / 67 U/L / x     / <1.0 ng/mL          140|110|8<83  4.3|19|0.63  8.6,--,--  08-26 @ 06:30      RADIOLOGY & ADDITIONAL TESTS:    Imaging Personally Reviewed:    Consultant(s) Notes Reviewed:      Care Discussed with Consultants/Other Providers:

## 2023-08-26 NOTE — CHART NOTE - NSCHARTNOTEFT_GEN_A_CORE
The patient is a 58-year-old female with a history of recently diagnosed thyroid cancer, who underwent surgery on 6/12/2023, with total thyroidectomy, central and lateral neck dissection at [facility]. Now with suspected CNS metastases.    1.  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  AJCC [id number].    -Agree with oncology consult, discussed patient care with Dr. Yeung  -Appreciate neurosurgery consult -> rec to start decadron and keppra.  -MRI stereo w/wo + DTI showed 2 small dural based lesions as per NSGY,  plan for outpatient SRS  -Appreciate rad-onc consult   -Recommend TSH suppression as below, goal TSH <0.1  -We will try to obtain NGS panel from surgical pathology outpatient, send Guardant test per heme-onc  -Any plan for radioactive iodine therapy would be after potential resection of CNS metastasis if it is possible. If planned, would plan to give steroids during treatment with radioactive iodine given possibility of cerebral edema/seizures with therapy.  This would be planned for outpatient, she will follow up with me outpatient.    2.  Postsurgical hypothyroidism  TSH goal <0.1 given EMILEE high risk cancer   -Resume levothyroxine 100 mcg daily  -Recheck TFTs in 4 to 6 weeks, as an outpatient    Discharge planning: She will see Dr. Patiño for outpatient follow-up at endocrinology clinic.  Please call/email Dr. Patiño with any questions regarding endocrinology plan.    [email or web address]  Cell: [phone] or TEAMS    Brennen Mike MD  Endocrine Fellow  For nonurgent matters, please email [email or web address] or [email or web address]. For urgent follow up questions, discharge recommendations, or new consults please call answering service at [phone] (weekdays), [phone] (nights/weekends). The patient is a 58-year-old female with a history of recently diagnosed thyroid cancer, who underwent surgery on 6/12/2023, with total thyroidectomy, central and lateral neck dissection at [facility]. Now with suspected CNS metastases.    1.  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  AJCC [id number].    -Agree with oncology consult, discussed patient care with Dr. Yeung  -Appreciate neurosurgery consult -> rec to start decadron and keppra.  -MRI stereo w/wo + DTI showed 2 small dural based lesions as per NSGY,  plan for outpatient SRS  -Appreciate rad-onc consult   -Recommend TSH suppression as below, goal TSH <0.1  -We will try to obtain NGS panel from surgical pathology outpatient, send Guardant test per heme-onc  -Any plan for radioactive iodine therapy would be after potential resection of CNS metastasis if it is possible. If planned, would plan to give steroids during treatment with radioactive iodine given possibility of cerebral edema/seizures with therapy.  This would be planned for outpatient, she will follow up with me outpatient.    2.  Postsurgical hypothyroidism  TSH goal <0.1 given EMILEE high risk cancer   -Resume levothyroxine 100 mcg daily  -Recheck TFTs in 4 to 6 weeks, as an outpatient    Discharge planning: She will see Dr. Patiño for outpatient follow-up at endocrinology clinic.        Brennen Mike MD  Endocrine Fellow  For nonurgent matters, please email [email or web address] or [email or web address]. For urgent follow up questions, discharge recommendations, or new consults please call answering service at [phone] (weekdays), [phone] (nights/weekends). The patient is a 58-year-old female with a history of recently diagnosed thyroid cancer, who underwent surgery on 6/12/2023, with total thyroidectomy, central and lateral neck dissection at [facility]. Now with suspected CNS metastases.    1.  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  AJCC [id number].    -Agree with oncology consult, discussed patient care with Dr. Yeung  -Appreciate neurosurgery consult -> rec to start decadron and keppra.  -MRI stereo w/wo + DTI showed 2 small dural based lesions as per NSGY,  plan for outpatient SRS  -Appreciate rad-onc consult   -Recommend TSH suppression as below, goal TSH <0.1  -We will try to obtain NGS panel from surgical pathology outpatient, send Guardant test per heme-onc  -Any plan for radioactive iodine therapy would be after potential resection of CNS metastasis if it is possible. If planned, would plan to give steroids during treatment with radioactive iodine given possibility of cerebral edema/seizures with therapy.  This would be planned for outpatient, she will follow up with Dr. Patiño outpatient.    2.  Postsurgical hypothyroidism  TSH goal <0.1 given EMILEE high risk cancer   -Resume levothyroxine 100 mcg daily  -Recheck TFTs in 4 to 6 weeks, as an outpatient    Discharge planning: She will see Dr. Patiño for outpatient follow-up at endocrinology clinic.        Brennen Mike MD  Endocrine Fellow  For nonurgent matters, please email [email or web address] or [email or web address]. For urgent follow up questions, discharge recommendations, or new consults please call answering service at [phone] (weekdays), [phone] (nights/weekends). The patient is a 58-year-old female with a history of recently diagnosed thyroid cancer, who underwent surgery on 6/12/2023, with total thyroidectomy, central and lateral neck dissection at [facility]. Now with suspected CNS metastases.    1.  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  AJCC [id number].    -Agree with oncology consult, discussed patient care with Dr. Yeung  -Appreciate neurosurgery consult -> rec to start decadron and keppra.  -MRI stereo w/wo + DTI showed 2 small dural based lesions as per NSGY,  plan for outpatient SRS  -Appreciate rad-onc consult   -Recommend TSH suppression as below, goal TSH <0.1  -We will try to obtain NGS panel from surgical pathology outpatient, send Guardant test per heme-onc  -Any plan for radioactive iodine therapy would be after potential resection of CNS metastasis if it is possible. If planned, would plan to give steroids during treatment with radioactive iodine given possibility of cerebral edema/seizures with therapy.  This would be planned for outpatient, she will follow up with Dr. Patiño outpatient.    2.  Postsurgical hypothyroidism  TSH goal <0.1 given EMILEE high risk cancer   -c/w levothyroxine 100 mcg daily  -Recheck TFTs in 4 to 6 weeks, as an outpatient    Discharge planning: She will see Dr. Patiño for outpatient follow-up at endocrinology clinic.        Brennen Mike MD  Endocrine Fellow  For nonurgent matters, please email [email or web address] or [email or web address]. For urgent follow up questions, discharge recommendations, or new consults please call answering service at [phone] (weekdays), [phone] (nights/weekends).

## 2023-08-26 NOTE — CHART NOTE - NSCHARTNOTEFT_GEN_A_CORE
Notified by RN pt febrile to 101 F w/ ABAD. Pt has been having known fevers; ID is on board, currently monitoring off antibiotics at this time. All other vital signs hemoynamically stable. Pt denies blurry vision, dizziness, lightheadedness, N/V/D. IV tylenol x1 given; both fever and HA resolved. Will continue to monitor. Will endorse to day team in AM, attending to follow.     ICU Vital Signs Last 24 Hrs  T(C): 36.4 (26 Aug 2023 05:18), Max: 38.3 (25 Aug 2023 21:10)  T(F): 97.6 (26 Aug 2023 05:18), Max: 101 (25 Aug 2023 21:10)  HR: 65 (26 Aug 2023 05:18) (59 - 85)  BP: 105/67 (26 Aug 2023 05:18) (89/56 - 113/74)  BP(mean): --  ABP: --  ABP(mean): --  RR: 18 (26 Aug 2023 05:18) (18 - 19)  SpO2: 96% (26 Aug 2023 05:18) (95% - 98%)    O2 Parameters below as of 26 Aug 2023 05:18  Patient On (Oxygen Delivery Method): room air      Shin Lozano PA-C  Department of Medicine

## 2023-08-26 NOTE — PROGRESS NOTE ADULT - SUBJECTIVE AND OBJECTIVE BOX
Chief Complaint/Follow-up on: Thyroid Cancer    Subjective:   Feels weak and nauseous. Feeling better than yesterday.   Blood cultures +ve, started on Abx.   Stared on steroids for edema.   MRI brain with 1.7 cm lesion corresponding to iodine avid lesion in frontal lobe, most compatible with mets. Additional 0.8 cm dural based lesion with bony hyperostosis.       ROS:   GENERAL: Denies pain, + weakness,  + fever  GI: +nausea  RESPI: No cough, shortness of breath  CV: No chest pain, palpitations     MEDICATIONS  (STANDING):  dexAMETHasone  Injectable 4 milliGRAM(s) IV Push every 6 hours  dexAMETHasone  IVPB 10 milliGRAM(s) IV Intermittent once  levETIRAcetam 1000 milliGRAM(s) Oral two times a day  levothyroxine 100 MICROGram(s) Oral daily  ondansetron Injectable 4 milliGRAM(s) IV Push every 6 hours  piperacillin/tazobactam IVPB.- 3.375 Gram(s) IV Intermittent once  piperacillin/tazobactam IVPB.. 3.375 Gram(s) IV Intermittent every 8 hours    MEDICATIONS  (PRN):  acetaminophen     Tablet .. 650 milliGRAM(s) Oral every 6 hours PRN Temp greater or equal to 38C (100.4F), Mild Pain (1 - 3)      PHYSICAL EXAM:  VITALS: T(C): 36.8 (08-26-23 @ 11:54)  T(F): 98.3 (08-26-23 @ 11:54), Max: 101 (08-25-23 @ 21:10)  HR: 67 (08-26-23 @ 11:54) (59 - 79)  BP: 108/70 (08-26-23 @ 11:54) (94/57 - 113/74)  RR:  (18 - 18)  SpO2:  (95% - 98%)  Wt(kg): --  GENERAL: NAD, well-groomed, well-developed  EYES: No proptosis, no injection  RESPIRATORY: No respiratory distress, normal chest expansion   CARDIOVASCULAR: well perfused extremities ; no peripheral edema  PSYCH: normal mood, normal affect  NEURO: alert, oriented, normal speech           08-26    140  |  110<H>  |  8   ----------------------------<  83  4.3   |  19<L>  |  0.63    eGFR: 103    Ca    8.6      08-26    TPro  7.0  /  Alb  3.9  /  TBili  0.9  /  DBili  x   /  AST  75<H>  /  ALT  49<H>  /  AlkPhos  97  08-24 08-24 @ 17:09 TSH -- FreeT4 -- T3 -- Anti TPO -- Anti Thyroglobulin Ab <20.0 TSI --  08-24 @ 12:09 TSH 21.80 FreeT4 -- T3 86 Anti TPO -- Anti Thyroglobulin Ab -- TSI --

## 2023-08-27 DIAGNOSIS — R73.9 HYPERGLYCEMIA, UNSPECIFIED: ICD-10-CM

## 2023-08-27 LAB
ANION GAP SERPL CALC-SCNC: 18 MMOL/L — HIGH (ref 5–17)
BASOPHILS # BLD AUTO: 0.01 K/UL — SIGNIFICANT CHANGE UP (ref 0–0.2)
BASOPHILS NFR BLD AUTO: 0.3 % — SIGNIFICANT CHANGE UP (ref 0–2)
BUN SERPL-MCNC: 8 MG/DL — SIGNIFICANT CHANGE UP (ref 7–23)
CALCIUM SERPL-MCNC: 9.5 MG/DL — SIGNIFICANT CHANGE UP (ref 8.4–10.5)
CHLORIDE SERPL-SCNC: 103 MMOL/L — SIGNIFICANT CHANGE UP (ref 96–108)
CO2 SERPL-SCNC: 19 MMOL/L — LOW (ref 22–31)
CREAT SERPL-MCNC: 0.55 MG/DL — SIGNIFICANT CHANGE UP (ref 0.5–1.3)
EGFR: 106 ML/MIN/1.73M2 — SIGNIFICANT CHANGE UP
EOSINOPHIL # BLD AUTO: 0 K/UL — SIGNIFICANT CHANGE UP (ref 0–0.5)
EOSINOPHIL NFR BLD AUTO: 0 % — SIGNIFICANT CHANGE UP (ref 0–6)
GLUCOSE BLDC GLUCOMTR-MCNC: 154 MG/DL — HIGH (ref 70–99)
GLUCOSE BLDC GLUCOMTR-MCNC: 176 MG/DL — HIGH (ref 70–99)
GLUCOSE BLDC GLUCOMTR-MCNC: 183 MG/DL — HIGH (ref 70–99)
GLUCOSE SERPL-MCNC: 184 MG/DL — HIGH (ref 70–99)
HCT VFR BLD CALC: 39 % — SIGNIFICANT CHANGE UP (ref 34.5–45)
HGB BLD-MCNC: 12.8 G/DL — SIGNIFICANT CHANGE UP (ref 11.5–15.5)
IMM GRANULOCYTES NFR BLD AUTO: 1.7 % — HIGH (ref 0–0.9)
LYMPHOCYTES # BLD AUTO: 0.69 K/UL — LOW (ref 1–3.3)
LYMPHOCYTES # BLD AUTO: 19.1 % — SIGNIFICANT CHANGE UP (ref 13–44)
MCHC RBC-ENTMCNC: 27.6 PG — SIGNIFICANT CHANGE UP (ref 27–34)
MCHC RBC-ENTMCNC: 32.8 GM/DL — SIGNIFICANT CHANGE UP (ref 32–36)
MCV RBC AUTO: 84.2 FL — SIGNIFICANT CHANGE UP (ref 80–100)
MONOCYTES # BLD AUTO: 0.17 K/UL — SIGNIFICANT CHANGE UP (ref 0–0.9)
MONOCYTES NFR BLD AUTO: 4.7 % — SIGNIFICANT CHANGE UP (ref 2–14)
NEUTROPHILS # BLD AUTO: 2.69 K/UL — SIGNIFICANT CHANGE UP (ref 1.8–7.4)
NEUTROPHILS NFR BLD AUTO: 74.2 % — SIGNIFICANT CHANGE UP (ref 43–77)
NRBC # BLD: 0 /100 WBCS — SIGNIFICANT CHANGE UP (ref 0–0)
PLATELET # BLD AUTO: 299 K/UL — SIGNIFICANT CHANGE UP (ref 150–400)
POTASSIUM SERPL-MCNC: 4.1 MMOL/L — SIGNIFICANT CHANGE UP (ref 3.5–5.3)
POTASSIUM SERPL-SCNC: 4.1 MMOL/L — SIGNIFICANT CHANGE UP (ref 3.5–5.3)
RBC # BLD: 4.63 M/UL — SIGNIFICANT CHANGE UP (ref 3.8–5.2)
RBC # FLD: 12.6 % — SIGNIFICANT CHANGE UP (ref 10.3–14.5)
SODIUM SERPL-SCNC: 140 MMOL/L — SIGNIFICANT CHANGE UP (ref 135–145)
WBC # BLD: 3.62 K/UL — LOW (ref 3.8–10.5)
WBC # FLD AUTO: 3.62 K/UL — LOW (ref 3.8–10.5)

## 2023-08-27 PROCEDURE — 93010 ELECTROCARDIOGRAM REPORT: CPT

## 2023-08-27 PROCEDURE — 99232 SBSQ HOSP IP/OBS MODERATE 35: CPT

## 2023-08-27 PROCEDURE — 99233 SBSQ HOSP IP/OBS HIGH 50: CPT

## 2023-08-27 RX ORDER — INSULIN LISPRO 100/ML
VIAL (ML) SUBCUTANEOUS
Refills: 0 | Status: DISCONTINUED | OUTPATIENT
Start: 2023-08-27 | End: 2023-08-29

## 2023-08-27 RX ORDER — BACITRACIN ZINC 500 UNIT/G
1 OINTMENT IN PACKET (EA) TOPICAL EVERY 12 HOURS
Refills: 0 | Status: DISCONTINUED | OUTPATIENT
Start: 2023-08-27 | End: 2023-08-29

## 2023-08-27 RX ORDER — INSULIN LISPRO 100/ML
VIAL (ML) SUBCUTANEOUS AT BEDTIME
Refills: 0 | Status: DISCONTINUED | OUTPATIENT
Start: 2023-08-27 | End: 2023-08-29

## 2023-08-27 RX ORDER — LEVETIRACETAM 250 MG/1
500 TABLET, FILM COATED ORAL
Refills: 0 | Status: DISCONTINUED | OUTPATIENT
Start: 2023-08-27 | End: 2023-08-28

## 2023-08-27 RX ADMIN — Medication 4 MILLIGRAM(S): at 18:37

## 2023-08-27 RX ADMIN — LEVETIRACETAM 500 MILLIGRAM(S): 250 TABLET, FILM COATED ORAL at 11:16

## 2023-08-27 RX ADMIN — Medication 100 MICROGRAM(S): at 05:45

## 2023-08-27 RX ADMIN — LEVETIRACETAM 500 MILLIGRAM(S): 250 TABLET, FILM COATED ORAL at 21:05

## 2023-08-27 RX ADMIN — Medication 4 MILLIGRAM(S): at 00:01

## 2023-08-27 RX ADMIN — PIPERACILLIN AND TAZOBACTAM 25 GRAM(S): 4; .5 INJECTION, POWDER, LYOPHILIZED, FOR SOLUTION INTRAVENOUS at 00:01

## 2023-08-27 RX ADMIN — Medication 1: at 09:57

## 2023-08-27 RX ADMIN — PIPERACILLIN AND TAZOBACTAM 25 GRAM(S): 4; .5 INJECTION, POWDER, LYOPHILIZED, FOR SOLUTION INTRAVENOUS at 14:17

## 2023-08-27 RX ADMIN — PIPERACILLIN AND TAZOBACTAM 25 GRAM(S): 4; .5 INJECTION, POWDER, LYOPHILIZED, FOR SOLUTION INTRAVENOUS at 05:49

## 2023-08-27 RX ADMIN — Medication 4 MILLIGRAM(S): at 11:19

## 2023-08-27 RX ADMIN — Medication 4 MILLIGRAM(S): at 05:48

## 2023-08-27 RX ADMIN — PIPERACILLIN AND TAZOBACTAM 25 GRAM(S): 4; .5 INJECTION, POWDER, LYOPHILIZED, FOR SOLUTION INTRAVENOUS at 21:05

## 2023-08-27 RX ADMIN — Medication 1: at 17:53

## 2023-08-27 NOTE — PROGRESS NOTE ADULT - SUBJECTIVE AND OBJECTIVE BOX
Chief Complaint/Follow-up on: Thyroid Cancer    Subjective: She continues to feel better. No longer having nausea, good appetite. No longer having fevers.   Noted to have steroid induced hyperglycemia related to the dexamethasone.       ROS:   GENERAL: Denies pain, has good appetite  GI: No nausea/vomiting  RESPI: No cough, shortness of breath  CV: No chest pain, palpitations     MEDICATIONS  (STANDING):  dexAMETHasone  Injectable 4 milliGRAM(s) IV Push every 6 hours  insulin lispro (ADMELOG) corrective regimen sliding scale   SubCutaneous three times a day before meals  insulin lispro (ADMELOG) corrective regimen sliding scale   SubCutaneous at bedtime  levETIRAcetam 500 milliGRAM(s) Oral two times a day  levothyroxine 100 MICROGram(s) Oral daily  piperacillin/tazobactam IVPB.. 3.375 Gram(s) IV Intermittent every 8 hours    MEDICATIONS  (PRN):  acetaminophen     Tablet .. 650 milliGRAM(s) Oral every 6 hours PRN Temp greater or equal to 38C (100.4F), Mild Pain (1 - 3)  bacitracin   Ointment 1 Application(s) Topical every 12 hours PRN Lip sore  ondansetron Injectable 4 milliGRAM(s) IV Push every 6 hours PRN Nausea and/or Vomiting      PHYSICAL EXAM:  VITALS: T(C): 36.4 (08-27-23 @ 12:56)  T(F): 97.5 (08-27-23 @ 12:56), Max: 98.2 (08-26-23 @ 20:53)  HR: 80 (08-27-23 @ 12:56) (57 - 80)  BP: 95/59 (08-27-23 @ 12:56) (95/59 - 105/71)  RR:  (18 - 18)  SpO2:  (95% - 99%)  Wt(kg): --  GENERAL: NAD, well-groomed, well-developed  EYES: No proptosis, no injection  HEENT:  Atraumatic, Normocephalic, moist mucous membranes  RESPIRATORY: No respiratory distress, normal chest expansion   CARDIOVASCULAR: well perfused extremities ; no peripheral edema  PSYCH: normal mood, normal affect  NEURO: alert, oriented, normal speech     POCT Blood Glucose.: 154 mg/dL (08-27-23 @ 09:18)  POCT Blood Glucose.: 254 mg/dL (08-26-23 @ 21:43)  POCT Blood Glucose.: 111 mg/dL (08-26-23 @ 17:50)        08-27    140  |  103  |  8   ----------------------------<  184<H>  4.1   |  19<L>  |  0.55    eGFR: 106    Ca    9.5      08-27 08-24 @ 17:09 TSH -- FreeT4 -- T3 -- Anti TPO -- Anti Thyroglobulin Ab <20.0 TSI --  08-24 @ 12:09 TSH 21.80 FreeT4 -- T3 86 Anti TPO -- Anti Thyroglobulin Ab -- TSI --

## 2023-08-27 NOTE — PROVIDER CONTACT NOTE (OTHER) - ACTION/TREATMENT ORDERED:
NP aware & assessed patient & reviewed all orders, labs, history & plan. No new orders per NP. Continue with Zosyn.
ARTHUR Tirado made aware and stated that will order EKG. EKG to be done. patient care ongoing at this time.

## 2023-08-27 NOTE — PROVIDER CONTACT NOTE (OTHER) - ASSESSMENT
Patient's Blood-Culture from 24-Aug-2023 Results: Growth in anaerobic bottle: Gram Negative Rods. Growth in anaerobic bottle: Escherichia coli. Patient has an active order for IVPB Zosyn.

## 2023-08-27 NOTE — PROGRESS NOTE ADULT - SUBJECTIVE AND OBJECTIVE BOX
Patient is a 58y old  Female who presents with a chief complaint of Fever x 4 days (25 Aug 2023 14:31)      SUBJECTIVE / OVERNIGHT EVENTS: no events     Family Bed side     T(C): 36.3 (08-27-23 @ 21:10), Max: 36.8 (08-27-23 @ 16:40)  HR: 63 (08-27-23 @ 21:10) (60 - 80)  BP: 99/60 (08-27-23 @ 21:10) (95/59 - 100/61)  RR: 18 (08-27-23 @ 21:10) (18 - 18)  SpO2: 97% (08-27-23 @ 21:10) (95% - 97%)      MEDICATIONS  (STANDING):  dexAMETHasone  Injectable 4 milliGRAM(s) IV Push every 6 hours  insulin lispro (ADMELOG) corrective regimen sliding scale   SubCutaneous three times a day before meals  insulin lispro (ADMELOG) corrective regimen sliding scale   SubCutaneous at bedtime  levETIRAcetam 500 milliGRAM(s) Oral two times a day  levothyroxine 100 MICROGram(s) Oral daily  piperacillin/tazobactam IVPB.. 3.375 Gram(s) IV Intermittent every 8 hours    MEDICATIONS  (PRN):  acetaminophen     Tablet .. 650 milliGRAM(s) Oral every 6 hours PRN Temp greater or equal to 38C (100.4F), Mild Pain (1 - 3)  bacitracin   Ointment 1 Application(s) Topical every 12 hours PRN Lip sore  ondansetron Injectable 4 milliGRAM(s) IV Push every 6 hours PRN Nausea and/or Vomiting      PHYSICAL EXAM:  GENERAL: NAD, well-developed  HEAD:  Atraumatic, Normocephalic  EYES: EOMI, PERRLA, conjunctiva and sclera clear  NECK: Supple, No JVD  CHEST/LUNG: Clear to auscultation bilaterally; No wheeze  HEART: Regular rate and rhythm; No murmurs, rubs, or gallops  ABDOMEN: Soft, Nontender, Nondistended; Bowel sounds present  EXTREMITIES:  2+ Peripheral Pulses, No clubbing, cyanosis, or edema  PSYCH: AAOx3  NEUROLOGY: non-focal  SKIN: No rashes or lesions                                          12.8   3.62  )-----------( 299      ( 27 Aug 2023 07:15 )             39.0               140|103|8<184  4.1|19|0.55  9.5,--,--  08-27 @ 07:07      RADIOLOGY & ADDITIONAL TESTS:    Imaging Personally Reviewed:    Consultant(s) Notes Reviewed:      Care Discussed with Consultants/Other Providers:

## 2023-08-27 NOTE — PROGRESS NOTE ADULT - SUBJECTIVE AND OBJECTIVE BOX
Follow Up:  e coli bacteremia    Interval History/ROS:  declined repeat CT scan, no abd pain, no dysuria    Allergies  No Known Allergies    ANTIMICROBIALS:  piperacillin/tazobactam IVPB.. 3.375 every 8 hours      OTHER MEDS:  MEDICATIONS  (STANDING):  acetaminophen     Tablet .. 650 every 6 hours PRN  dexAMETHasone  Injectable 4 every 6 hours  insulin lispro (ADMELOG) corrective regimen sliding scale  three times a day before meals  insulin lispro (ADMELOG) corrective regimen sliding scale  at bedtime  levETIRAcetam 500 two times a day  levothyroxine 100 daily  ondansetron Injectable 4 every 6 hours PRN      Vital Signs Last 24 Hrs  T(C): 36.4 (27 Aug 2023 12:56), Max: 36.8 (26 Aug 2023 20:53)  T(F): 97.5 (27 Aug 2023 12:56), Max: 98.2 (26 Aug 2023 20:53)  HR: 80 (27 Aug 2023 12:56) (57 - 80)  BP: 95/59 (27 Aug 2023 12:56) (95/59 - 105/71)  BP(mean): --  RR: 18 (27 Aug 2023 12:56) (18 - 18)  SpO2: 96% (27 Aug 2023 12:56) (95% - 99%)    Parameters below as of 27 Aug 2023 12:56  Patient On (Oxygen Delivery Method): room air    PHYSICAL EXAM:  General: WN/WD NAD, Non-toxic  Neurology: A&Ox3, nonfocal  Respiratory: Clear to auscultation bilaterally  CV: RRR, S1S2, no murmurs, rubs or gallops  Abdominal: Soft, Non-tender, non-distended, normal bowel sounds  Extremities: No edema  Line Sites: Clear  Skin: No rash                          12.8   3.62  )-----------( 299      ( 27 Aug 2023 07:15 )             39.0       08-27    140  |  103  |  8   ----------------------------<  184<H>  4.1   |  19<L>  |  0.55    Ca    9.5      27 Aug 2023 07:07      (08.24.23 @ 14:31)   Red Blood Cell - Urine: 6 /hpf  White Blood Cell - Urine: 4 /HPF  Hyaline Casts: 2 /lpf  Bacteria: Negative  Squamous Epithelial Cells: 5 /hpf      MICROBIOLOGY:  Clean Catch Clean Catch (Midstream)  08-24-23   10,000 - 49,000 CFU/mL Streptococcus agalactiae (Group B) isolated  -  --    .Blood Blood-Peripheral  08-24-23   Growth in anaerobic bottle: Escherichia coli    .Blood Blood-Peripheral  08-24-23   No growth at 48 Hours  --  --    Rapid RVP Result: NotDetec (08-24 @ 12:07)    RADIOLOGY:  < from: NM Thyroid Uptake Study Mets (08.24.23 @ 15:34) >  FINDINGS:    There is iodine-avid tissue in the left thyroid bed extending into   adjacent soft tissue planes, the left lateral compartment and the left   retropharyngeal space.    Smaller mildly iodine avid focus in the right thyroid bed.    Additionally there is an iodine avid brain lesion in the right inferior   medial frontal lobe.    Ill defined sclerotic foci in the midthoracic spine are suggestive of   degenerative changes.    Otherwise physiologic radiotracer uptake is seen in the   nasopharynx,salivary glands, gastrointestinal tract, and urinary bladder.    < end of copied text >        Mike Medrano MD; Division of Infectious Disease; Pager: [phone]; nights and weekends: [phone]

## 2023-08-28 LAB
-  AMIKACIN: SIGNIFICANT CHANGE UP
-  AMPICILLIN/SULBACTAM: SIGNIFICANT CHANGE UP
-  AMPICILLIN: SIGNIFICANT CHANGE UP
-  AZTREONAM: SIGNIFICANT CHANGE UP
-  CEFAZOLIN: SIGNIFICANT CHANGE UP
-  CEFEPIME: SIGNIFICANT CHANGE UP
-  CEFOXITIN: SIGNIFICANT CHANGE UP
-  CEFTRIAXONE: SIGNIFICANT CHANGE UP
-  CIPROFLOXACIN: SIGNIFICANT CHANGE UP
-  ERTAPENEM: SIGNIFICANT CHANGE UP
-  GENTAMICIN: SIGNIFICANT CHANGE UP
-  IMIPENEM: SIGNIFICANT CHANGE UP
-  LEVOFLOXACIN: SIGNIFICANT CHANGE UP
-  MEROPENEM: SIGNIFICANT CHANGE UP
-  PIPERACILLIN/TAZOBACTAM: SIGNIFICANT CHANGE UP
-  TOBRAMYCIN: SIGNIFICANT CHANGE UP
-  TRIMETHOPRIM/SULFAMETHOXAZOLE: SIGNIFICANT CHANGE UP
CULTURE RESULTS: SIGNIFICANT CHANGE UP
GLUCOSE BLDC GLUCOMTR-MCNC: 125 MG/DL — HIGH (ref 70–99)
GLUCOSE BLDC GLUCOMTR-MCNC: 131 MG/DL — HIGH (ref 70–99)
GLUCOSE BLDC GLUCOMTR-MCNC: 137 MG/DL — HIGH (ref 70–99)
GLUCOSE BLDC GLUCOMTR-MCNC: 176 MG/DL — HIGH (ref 70–99)
METHOD TYPE: SIGNIFICANT CHANGE UP
ORGANISM # SPEC MICROSCOPIC CNT: SIGNIFICANT CHANGE UP
SPECIMEN SOURCE: SIGNIFICANT CHANGE UP

## 2023-08-28 PROCEDURE — 99233 SBSQ HOSP IP/OBS HIGH 50: CPT | Mod: GC

## 2023-08-28 RX ORDER — DEXAMETHASONE 0.5 MG/5ML
6 ELIXIR ORAL EVERY 6 HOURS
Refills: 0 | Status: DISCONTINUED | OUTPATIENT
Start: 2023-08-28 | End: 2023-08-28

## 2023-08-28 RX ORDER — CEPHALEXIN 500 MG
1000 CAPSULE ORAL EVERY 6 HOURS
Refills: 0 | Status: DISCONTINUED | OUTPATIENT
Start: 2023-08-28 | End: 2023-08-28

## 2023-08-28 RX ORDER — LEVETIRACETAM 250 MG/1
500 TABLET, FILM COATED ORAL AT BEDTIME
Refills: 0 | Status: DISCONTINUED | OUTPATIENT
Start: 2023-08-28 | End: 2023-08-29

## 2023-08-28 RX ORDER — DEXAMETHASONE 0.5 MG/5ML
4 ELIXIR ORAL EVERY 6 HOURS
Refills: 0 | Status: DISCONTINUED | OUTPATIENT
Start: 2023-08-28 | End: 2023-08-29

## 2023-08-28 RX ORDER — CEFTRIAXONE 500 MG/1
1000 INJECTION, POWDER, FOR SOLUTION INTRAMUSCULAR; INTRAVENOUS EVERY 24 HOURS
Refills: 0 | Status: DISCONTINUED | OUTPATIENT
Start: 2023-08-28 | End: 2023-08-28

## 2023-08-28 RX ORDER — CEFTRIAXONE 500 MG/1
1000 INJECTION, POWDER, FOR SOLUTION INTRAMUSCULAR; INTRAVENOUS EVERY 24 HOURS
Refills: 0 | Status: DISCONTINUED | OUTPATIENT
Start: 2023-08-28 | End: 2023-08-29

## 2023-08-28 RX ORDER — DEXAMETHASONE 0.5 MG/5ML
4 ELIXIR ORAL EVERY 6 HOURS
Refills: 0 | Status: DISCONTINUED | OUTPATIENT
Start: 2023-08-28 | End: 2023-08-28

## 2023-08-28 RX ORDER — LEVETIRACETAM 250 MG/1
250 TABLET, FILM COATED ORAL
Refills: 0 | Status: DISCONTINUED | OUTPATIENT
Start: 2023-08-28 | End: 2023-08-29

## 2023-08-28 RX ADMIN — Medication 4 MILLIGRAM(S): at 18:36

## 2023-08-28 RX ADMIN — Medication 4 MILLIGRAM(S): at 00:01

## 2023-08-28 RX ADMIN — Medication 100 MICROGRAM(S): at 05:31

## 2023-08-28 RX ADMIN — PIPERACILLIN AND TAZOBACTAM 25 GRAM(S): 4; .5 INJECTION, POWDER, LYOPHILIZED, FOR SOLUTION INTRAVENOUS at 05:31

## 2023-08-28 RX ADMIN — Medication 4 MILLIGRAM(S): at 05:31

## 2023-08-28 RX ADMIN — Medication 4 MILLIGRAM(S): at 11:53

## 2023-08-28 RX ADMIN — Medication 1: at 09:14

## 2023-08-28 RX ADMIN — LEVETIRACETAM 500 MILLIGRAM(S): 250 TABLET, FILM COATED ORAL at 06:38

## 2023-08-28 RX ADMIN — CEFTRIAXONE 100 MILLIGRAM(S): 500 INJECTION, POWDER, FOR SOLUTION INTRAMUSCULAR; INTRAVENOUS at 18:13

## 2023-08-28 RX ADMIN — LEVETIRACETAM 500 MILLIGRAM(S): 250 TABLET, FILM COATED ORAL at 22:10

## 2023-08-28 NOTE — PROGRESS NOTE ADULT - SUBJECTIVE AND OBJECTIVE BOX
Otoniel Arguelles MD PGY-5 Hematology Oncology  Reachable on TEAMS    INTERVAL HPI/OVERNIGHT EVENTS:  Patient S&E at bedside. No acute events, no active complaints, ambulating well.     VITAL SIGNS:  T(F): 97.5 (08-28-23 @ 16:20)  HR: 65 (08-28-23 @ 16:20)  BP: 108/63 (08-28-23 @ 16:20)  RR: 18 (08-28-23 @ 16:20)  SpO2: 95% (08-28-23 @ 16:20)  Wt(kg): --    PHYSICAL EXAM:    Constitutional: NAD  Eyes: EOMI, sclera non-icteric  Neck: supple, no masses, no JVD  Respiratory: CTA b/l  Cardiovascular: RRR, no M/R/G  Gastrointestinal: soft, NTND, no masses palpable, + BS, no hepatosplenomegaly  Extremities: no c/c/e  Neurological: no focal deficits      MEDICATIONS  (STANDING):  cefTRIAXone   IVPB 1000 milliGRAM(s) IV Intermittent every 24 hours  dexAMETHasone  Injectable 4 milliGRAM(s) IV Push every 6 hours  insulin lispro (ADMELOG) corrective regimen sliding scale   SubCutaneous three times a day before meals  insulin lispro (ADMELOG) corrective regimen sliding scale   SubCutaneous at bedtime  levETIRAcetam 250 milliGRAM(s) Oral <User Schedule>  levETIRAcetam 500 milliGRAM(s) Oral at bedtime  levothyroxine 100 MICROGram(s) Oral daily    MEDICATIONS  (PRN):  acetaminophen     Tablet .. 650 milliGRAM(s) Oral every 6 hours PRN Temp greater or equal to 38C (100.4F), Mild Pain (1 - 3)  bacitracin   Ointment 1 Application(s) Topical every 12 hours PRN Lip sore  ondansetron Injectable 4 milliGRAM(s) IV Push every 6 hours PRN Nausea and/or Vomiting      Allergies    No Known Allergies    Intolerances        LABS:                        12.8   3.62  )-----------( 299      ( 27 Aug 2023 07:15 )             39.0     08-27    140  |  103  |  8   ----------------------------<  184<H>  4.1   |  19<L>  |  0.55    Ca    9.5      27 Aug 2023 07:07        Urinalysis Basic - ( 27 Aug 2023 07:07 )    Color: x / Appearance: x / SG: x / pH: x  Gluc: 184 mg/dL / Ketone: x  / Bili: x / Urobili: x   Blood: x / Protein: x / Nitrite: x   Leuk Esterase: x / RBC: x / WBC x   Sq Epi: x / Non Sq Epi: x / Bacteria: x        RADIOLOGY & ADDITIONAL TESTS:  Studies reviewed.

## 2023-08-28 NOTE — CHART NOTE - NSCHARTNOTEFT_GEN_A_CORE
Had extensive conversation with pt regarding antibiotic plan and changes. pt agreeable to iv ceftriaxone however as per rn, pts iv is causing her discomfort and pt refusing a replacement. In this event, iv antibiotics were changed to a po regimen based on discussion with ID attg. awaiting neurosurgery reccs for transitioning iv decadron to PO    Angelique HENRY-BC

## 2023-08-28 NOTE — PROGRESS NOTE ADULT - SUBJECTIVE AND OBJECTIVE BOX
Cardiovascular Disease Progress Note  Date of service: 08-28-23 @ 17:44    Overnight events: No acute events overnight.  Pt denies chest pain or SOB  Otherwise review of systems negative    Objective Findings:  T(C): 36.4 (08-28-23 @ 16:20), Max: 36.8 (08-28-23 @ 00:01)  HR: 65 (08-28-23 @ 16:20) (59 - 66)  BP: 108/63 (08-28-23 @ 16:20) (99/60 - 108/63)  RR: 18 (08-28-23 @ 16:20) (18 - 18)  SpO2: 95% (08-28-23 @ 16:20) (95% - 98%)  Wt(kg): --  Daily     Daily       Physical Exam:  Gen: NAD; Patient resting comfortably  HEENT: EOMI, Normocephalic/ atraumatic  CV: RRR, normal S1 + S2, no m/r/g  Lungs:  Normal respiratory effort; clear to auscultation bilaterally  Abd: soft, non-tender; bowel sounds present  Ext: No edema; warm and well perfused    Telemetry: sinus     Laboratory Data:                        12.8   3.62  )-----------( 299      ( 27 Aug 2023 07:15 )             39.0     08-27    140  |  103  |  8   ----------------------------<  184<H>  4.1   |  19<L>  |  0.55    Ca    9.5      27 Aug 2023 07:07                Inpatient Medications:  MEDICATIONS  (STANDING):  cefTRIAXone   IVPB 1000 milliGRAM(s) IV Intermittent every 24 hours  dexAMETHasone  Injectable 6 milliGRAM(s) IV Push every 6 hours  insulin lispro (ADMELOG) corrective regimen sliding scale   SubCutaneous three times a day before meals  insulin lispro (ADMELOG) corrective regimen sliding scale   SubCutaneous at bedtime  levETIRAcetam 250 milliGRAM(s) Oral <User Schedule>  levETIRAcetam 500 milliGRAM(s) Oral at bedtime  levothyroxine 100 MICROGram(s) Oral daily      Assessment:  58-year-old female past medical history hyperlipidemia, thyroid cancer status post thyroidectomy 1 month ago p/w fever, generalized weakness and nausea x 4 days.     Plan of Care:      #Syncope  - Likely 2/2 hypovolemia in setting of fever and vomiting  - Echo 8/25/2023 shows normal LV systolic function with no significant structural abnormalities.   - CT head shows vasogenic edema, concern for metastatic disease. Neurosurgery following. Outpatient follow up for further management.   - EKG shows sinus rhythm with IRBBB and no acute ischemic changes  - No further cardiac work up at this time    #Bacteremia  - Cultures 8/24/2023 positive for Ecoli. Culture 8/27 with no growth to date  - Pt states that her  was hospitalized several weeks ago with sepsis and was found to be growing E coli in his cultures as well.   - Abx as per ID.       #HLD  - Statin therapy    #hypothyroidism  - Synthroid        Over 25 minutes spent on total encounter; more than 50% of the visit was spent counseling and/or coordinating care by the attending physician.      Hamilton Macario DO [facility]  Cardiovascular Disease  [phone]

## 2023-08-28 NOTE — PROGRESS NOTE ADULT - ATTENDING COMMENTS
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  Patient on Dex to manage edema.    Plan for outpatient RT/SRS.    Plan for NICHOLS therapy per Endocrine.    Being treated for E. coli bacteremia by ID.    Can check peripheral blood ct-DNA testing ([facility]) as outpatient.    D/W  at bedside.      Otoniel Sharpe MD

## 2023-08-28 NOTE — PROGRESS NOTE ADULT - SUBJECTIVE AND OBJECTIVE BOX
Patient is a 58y old  Female who presents with a chief complaint of Fever x 4 days (25 Aug 2023 14:31)      SUBJECTIVE / OVERNIGHT EVENTS: no events     Family Bed side     T(C): 36.4 (08-28-23 @ 16:20), Max: 36.4 (08-28-23 @ 09:34)  HR: 65 (08-28-23 @ 16:20) (60 - 66)  BP: 108/63 (08-28-23 @ 16:20) (101/67 - 108/63)  RR: 18 (08-28-23 @ 16:20) (18 - 18)  SpO2: 95% (08-28-23 @ 16:20) (95% - 95%)      MEDICATIONS  (STANDING):  cefTRIAXone   IVPB 1000 milliGRAM(s) IV Intermittent every 24 hours  dexAMETHasone  Injectable 4 milliGRAM(s) IV Push every 6 hours  insulin lispro (ADMELOG) corrective regimen sliding scale   SubCutaneous three times a day before meals  insulin lispro (ADMELOG) corrective regimen sliding scale   SubCutaneous at bedtime  levETIRAcetam 250 milliGRAM(s) Oral <User Schedule>  levETIRAcetam 500 milliGRAM(s) Oral at bedtime  levothyroxine 100 MICROGram(s) Oral daily    MEDICATIONS  (PRN):  acetaminophen     Tablet .. 650 milliGRAM(s) Oral every 6 hours PRN Temp greater or equal to 38C (100.4F), Mild Pain (1 - 3)  bacitracin   Ointment 1 Application(s) Topical every 12 hours PRN Lip sore  ondansetron Injectable 4 milliGRAM(s) IV Push every 6 hours PRN Nausea and/or Vomiting              PHYSICAL EXAM:  GENERAL: NAD, well-developed  HEAD:  Atraumatic, Normocephalic  EYES: EOMI, PERRLA, conjunctiva and sclera clear  NECK: Supple, No JVD  CHEST/LUNG: Clear to auscultation bilaterally; No wheeze  HEART: Regular rate and rhythm; No murmurs, rubs, or gallops  ABDOMEN: Soft, Nontender, Nondistended; Bowel sounds present  EXTREMITIES:  2+ Peripheral Pulses, No clubbing, cyanosis, or edema  PSYCH: AAOx3  NEUROLOGY: non-focal  SKIN: No rashes or lesions                                            12.8   3.62  )-----------( 299      ( 27 Aug 2023 07:15 )             39.0                   RADIOLOGY & ADDITIONAL TESTS:    Imaging Personally Reviewed:    Consultant(s) Notes Reviewed:      Care Discussed with Consultants/Other Providers:

## 2023-08-28 NOTE — CHART NOTE - NSCHARTNOTESELECT_GEN_ALL_CORE
Event Note
ID note/Event Note
endocrinology/Event Note
Event Note
Event Note
neurosurgery/Event Note

## 2023-08-28 NOTE — CHART NOTE - NSCHARTNOTEFT_GEN_A_CORE
Infectious Disease Attending     -  not seen today  discussed with resident  8/27 BCx2 NGTD  I recommended abd sono, IV ceftriaxone 1 gm daily through 9/5  declining IV  wants to be discharged  Keflex 1000 mg po every 8 hours through 9/5       Patient at risk for adverse outcome from E coli bacteremia - primary source not defined, treatment not optima.

## 2023-08-29 ENCOUNTER — TRANSCRIPTION ENCOUNTER (OUTPATIENT)
Age: 58
End: 2023-08-29

## 2023-08-29 VITALS
TEMPERATURE: 98 F | DIASTOLIC BLOOD PRESSURE: 64 MMHG | SYSTOLIC BLOOD PRESSURE: 102 MMHG | RESPIRATION RATE: 18 BRPM | HEART RATE: 68 BPM | OXYGEN SATURATION: 97 %

## 2023-08-29 LAB
CULTURE RESULTS: SIGNIFICANT CHANGE UP
GLUCOSE BLDC GLUCOMTR-MCNC: 111 MG/DL — HIGH (ref 70–99)
GLUCOSE BLDC GLUCOMTR-MCNC: 168 MG/DL — HIGH (ref 70–99)
HCT VFR BLD CALC: 35.2 % — SIGNIFICANT CHANGE UP (ref 34.5–45)
HGB BLD-MCNC: 11.4 G/DL — LOW (ref 11.5–15.5)
MCHC RBC-ENTMCNC: 27.1 PG — SIGNIFICANT CHANGE UP (ref 27–34)
MCHC RBC-ENTMCNC: 32.4 GM/DL — SIGNIFICANT CHANGE UP (ref 32–36)
MCV RBC AUTO: 83.6 FL — SIGNIFICANT CHANGE UP (ref 80–100)
NRBC # BLD: 0 /100 WBCS — SIGNIFICANT CHANGE UP (ref 0–0)
PLATELET # BLD AUTO: 316 K/UL — SIGNIFICANT CHANGE UP (ref 150–400)
RBC # BLD: 4.21 M/UL — SIGNIFICANT CHANGE UP (ref 3.8–5.2)
RBC # FLD: 12.7 % — SIGNIFICANT CHANGE UP (ref 10.3–14.5)
SPECIMEN SOURCE: SIGNIFICANT CHANGE UP
WBC # BLD: 8.66 K/UL — SIGNIFICANT CHANGE UP (ref 3.8–10.5)
WBC # FLD AUTO: 8.66 K/UL — SIGNIFICANT CHANGE UP (ref 3.8–10.5)

## 2023-08-29 PROCEDURE — 76700 US EXAM ABDOM COMPLETE: CPT

## 2023-08-29 PROCEDURE — 82550 ASSAY OF CK (CPK): CPT

## 2023-08-29 PROCEDURE — 83605 ASSAY OF LACTIC ACID: CPT

## 2023-08-29 PROCEDURE — 80053 COMPREHEN METABOLIC PANEL: CPT

## 2023-08-29 PROCEDURE — 0225U NFCT DS DNA&RNA 21 SARSCOV2: CPT

## 2023-08-29 PROCEDURE — 82435 ASSAY OF BLOOD CHLORIDE: CPT

## 2023-08-29 PROCEDURE — 87150 DNA/RNA AMPLIFIED PROBE: CPT

## 2023-08-29 PROCEDURE — 93356 MYOCRD STRAIN IMG SPCKL TRCK: CPT

## 2023-08-29 PROCEDURE — 84436 ASSAY OF TOTAL THYROXINE: CPT

## 2023-08-29 PROCEDURE — 82330 ASSAY OF CALCIUM: CPT

## 2023-08-29 PROCEDURE — 82803 BLOOD GASES ANY COMBINATION: CPT

## 2023-08-29 PROCEDURE — 84132 ASSAY OF SERUM POTASSIUM: CPT

## 2023-08-29 PROCEDURE — 82553 CREATINE MB FRACTION: CPT

## 2023-08-29 PROCEDURE — 96375 TX/PRO/DX INJ NEW DRUG ADDON: CPT

## 2023-08-29 PROCEDURE — 84295 ASSAY OF SERUM SODIUM: CPT

## 2023-08-29 PROCEDURE — 85025 COMPLETE CBC W/AUTO DIFF WBC: CPT

## 2023-08-29 PROCEDURE — 84484 ASSAY OF TROPONIN QUANT: CPT

## 2023-08-29 PROCEDURE — 78020 THYROID MET UPTAKE: CPT

## 2023-08-29 PROCEDURE — 99232 SBSQ HOSP IP/OBS MODERATE 35: CPT

## 2023-08-29 PROCEDURE — 84443 ASSAY THYROID STIM HORMONE: CPT

## 2023-08-29 PROCEDURE — 93005 ELECTROCARDIOGRAM TRACING: CPT

## 2023-08-29 PROCEDURE — 70450 CT HEAD/BRAIN W/O DYE: CPT | Mod: MA

## 2023-08-29 PROCEDURE — 76700 US EXAM ABDOM COMPLETE: CPT | Mod: 26

## 2023-08-29 PROCEDURE — 82947 ASSAY GLUCOSE BLOOD QUANT: CPT

## 2023-08-29 PROCEDURE — 90471 IMMUNIZATION ADMIN: CPT

## 2023-08-29 PROCEDURE — 85610 PROTHROMBIN TIME: CPT

## 2023-08-29 PROCEDURE — 36415 COLL VENOUS BLD VENIPUNCTURE: CPT

## 2023-08-29 PROCEDURE — 85730 THROMBOPLASTIN TIME PARTIAL: CPT

## 2023-08-29 PROCEDURE — 87077 CULTURE AEROBIC IDENTIFY: CPT

## 2023-08-29 PROCEDURE — 86803 HEPATITIS C AB TEST: CPT

## 2023-08-29 PROCEDURE — 81001 URINALYSIS AUTO W/SCOPE: CPT

## 2023-08-29 PROCEDURE — 85018 HEMOGLOBIN: CPT

## 2023-08-29 PROCEDURE — 85027 COMPLETE CBC AUTOMATED: CPT

## 2023-08-29 PROCEDURE — 99285 EMERGENCY DEPT VISIT HI MDM: CPT

## 2023-08-29 PROCEDURE — 78018 THYROID MET IMAGING BODY: CPT | Mod: MA

## 2023-08-29 PROCEDURE — 85014 HEMATOCRIT: CPT

## 2023-08-29 PROCEDURE — 87186 SC STD MICRODIL/AGAR DIL: CPT

## 2023-08-29 PROCEDURE — 90715 TDAP VACCINE 7 YRS/> IM: CPT

## 2023-08-29 PROCEDURE — 84480 ASSAY TRIIODOTHYRONINE (T3): CPT

## 2023-08-29 PROCEDURE — 96376 TX/PRO/DX INJ SAME DRUG ADON: CPT

## 2023-08-29 PROCEDURE — 82962 GLUCOSE BLOOD TEST: CPT

## 2023-08-29 PROCEDURE — 12051 INTMD RPR FACE/MM 2.5 CM/<: CPT

## 2023-08-29 PROCEDURE — 96374 THER/PROPH/DIAG INJ IV PUSH: CPT

## 2023-08-29 PROCEDURE — A9585: CPT

## 2023-08-29 PROCEDURE — 78830 RP LOCLZJ TUM SPECT W/CT 1: CPT

## 2023-08-29 PROCEDURE — 93306 TTE W/DOPPLER COMPLETE: CPT

## 2023-08-29 PROCEDURE — 70486 CT MAXILLOFACIAL W/O DYE: CPT | Mod: MA

## 2023-08-29 PROCEDURE — 70553 MRI BRAIN STEM W/O & W/DYE: CPT

## 2023-08-29 PROCEDURE — 84432 ASSAY OF THYROGLOBULIN: CPT

## 2023-08-29 PROCEDURE — 80048 BASIC METABOLIC PNL TOTAL CA: CPT

## 2023-08-29 PROCEDURE — 71045 X-RAY EXAM CHEST 1 VIEW: CPT

## 2023-08-29 PROCEDURE — 76376 3D RENDER W/INTRP POSTPROCES: CPT

## 2023-08-29 PROCEDURE — 87086 URINE CULTURE/COLONY COUNT: CPT

## 2023-08-29 PROCEDURE — 87040 BLOOD CULTURE FOR BACTERIA: CPT

## 2023-08-29 PROCEDURE — 86800 THYROGLOBULIN ANTIBODY: CPT

## 2023-08-29 RX ORDER — DEXAMETHASONE 0.5 MG/5ML
1 ELIXIR ORAL
Qty: 5 | Refills: 0
Start: 2023-08-29

## 2023-08-29 RX ORDER — CEPHALEXIN 500 MG
2 CAPSULE ORAL
Qty: 64 | Refills: 0
Start: 2023-08-29 | End: 2023-09-05

## 2023-08-29 RX ORDER — PANTOPRAZOLE SODIUM 20 MG/1
1 TABLET, DELAYED RELEASE ORAL
Qty: 9 | Refills: 0
Start: 2023-08-29 | End: 2023-09-06

## 2023-08-29 RX ORDER — DEXAMETHASONE 0.5 MG/5ML
1 ELIXIR ORAL
Qty: 4 | Refills: 0
Start: 2023-08-29 | End: 2023-08-29

## 2023-08-29 RX ORDER — LEVETIRACETAM 250 MG/1
1 TABLET, FILM COATED ORAL
Qty: 30 | Refills: 0
Start: 2023-08-29 | End: 2023-09-27

## 2023-08-29 RX ORDER — ISOPROPYL ALCOHOL, BENZOCAINE .7; .06 ML/ML; ML/ML
0 SWAB TOPICAL
Qty: 100 | Refills: 1
Start: 2023-08-29

## 2023-08-29 RX ORDER — LEVOTHYROXINE SODIUM 125 MCG
1 TABLET ORAL
Qty: 30 | Refills: 0
Start: 2023-08-29 | End: 2023-09-27

## 2023-08-29 RX ORDER — LACTOBACILLUS ACIDOPHILUS 100MM CELL
1 CAPSULE ORAL
Qty: 8 | Refills: 0
Start: 2023-08-29 | End: 2023-09-05

## 2023-08-29 RX ORDER — DEXAMETHASONE 0.5 MG/5ML
1 ELIXIR ORAL
Qty: 14 | Refills: 0
Start: 2023-08-29

## 2023-08-29 RX ORDER — FAMOTIDINE 10 MG/ML
0 INJECTION INTRAVENOUS
Qty: 0 | Refills: 0 | DISCHARGE

## 2023-08-29 RX ADMIN — LEVETIRACETAM 250 MILLIGRAM(S): 250 TABLET, FILM COATED ORAL at 09:01

## 2023-08-29 RX ADMIN — Medication 100 MICROGRAM(S): at 06:39

## 2023-08-29 RX ADMIN — Medication 4 MILLIGRAM(S): at 13:59

## 2023-08-29 RX ADMIN — Medication 1: at 09:08

## 2023-08-29 RX ADMIN — Medication 4 MILLIGRAM(S): at 06:39

## 2023-08-29 RX ADMIN — Medication 4 MILLIGRAM(S): at 00:53

## 2023-08-29 NOTE — DISCHARGE NOTE PROVIDER - PROVIDER TOKENS
PROVIDER:[TOKEN:[[id number]],FOLLOWUP:[1 week]],PROVIDER:[TOKEN:[[id number]],FOLLOWUP:[1 week]],PROVIDER:[TOKEN:[[id number]],FOLLOWUP:[1 week]] PROVIDER:[TOKEN:[[id number]],FOLLOWUP:[1 week]],PROVIDER:[TOKEN:[[id number]],FOLLOWUP:[1 week]],PROVIDER:[TOKEN:[[id number]],FOLLOWUP:[1 week]],PROVIDER:[TOKEN:[[id number]],FOLLOWUP:[1 week]]

## 2023-08-29 NOTE — DISCHARGE NOTE PROVIDER - CARE PROVIDER_API CALL
Otoniel Sharpe  Medical Oncology  [address] [place] [address]  Phone: [phone]  Fax: [phone]  Follow Up Time: 1 week    Lei Patiño  Endocrinology/Metab/Diabetes  [address]  [place] [address]  Phone: [phone]  Fax: [phone]  Follow Up Time: 1 week    Andrews Mariano  Neurology  [address] [place] [address]  Phone: ()-  Fax: ()-  Follow Up Time: 1 week   Otoniel Sharpe  Medical Oncology  [address] [place] [address]  Phone: [phone]  Fax: [phone]  Follow Up Time: 1 week    Lei Patiño  Endocrinology/Metab/Diabetes  [address] [facility], [address]  [place] [address]  Phone: [phone]  Fax: [phone]  Follow Up Time: 1 week    Inocente Cunningham  Neurosurgery  [address] [facility], [address]  [place] [address]  Phone: [phone]  Fax: [phone]  Follow Up Time: 1 week    Nino Marie  Radiation Oncology  [address] [place] [address]  Phone: [phone]  Fax: [phone]  Follow Up Time: 1 week

## 2023-08-29 NOTE — DISCHARGE NOTE PROVIDER - NSDCMRMEDTOKEN_GEN_ALL_CORE_FT
acetaminophen-codeine 300 mg-15 mg oral tablet: 1 tab(s) orally every 6 hours, As Needed PRN moderate to severe pain -for muscle spasm -for moderate pain MDD:DO NOT TAKE MORE THAN 3 TABS DAILY  Pepcid 20 mg oral tablet:  orally 2 doses preop   cephalexin 500 mg oral capsule: 2 cap(s) orally every 6 hours end on 9/5/23  dexAMETHasone 2 mg oral tablet: 1 tab(s) orally 2 times a day FOR 9/3-9/4  dexAMETHasone 2 mg oral tablet: 1 tab(s) orally once a day  dexAMETHasone 4 mg oral tablet: 1 tab(s) orally every 6 hours  dexAMETHasone 4 mg oral tablet: 1 tab(s) orally every 8 hours FOR 8/30- 8/31  dexAMETHasone 4 mg oral tablet: 1 tab(s) orally 2 times a day FOR 9/1-9/2  lactobacillus acidophilus oral capsule: 1 cap(s) orally once a day  levETIRAcetam 250 mg oral tablet: 1 tab(s) orally once a day (in the morning)  levETIRAcetam 500 mg oral tablet: 1 tab(s) orally once a day (at bedtime)  levothyroxine 100 mcg (0.1 mg) oral tablet: 1 tab(s) orally once a day  Protonix 40 mg oral delayed release tablet: 1 tab(s) orally once a day before breakfast

## 2023-08-29 NOTE — PROGRESS NOTE ADULT - SUBJECTIVE AND OBJECTIVE BOX
Follow Up:  E coli bacteremia, metastatic thyroid ca    Interval History/ROS:  denies abd pain, back pain, dysuria,    anxious for discharge  - discusses  that  had E coli bacteremia (his isolate was apparently susceptible to Cipro)    Allergies  No Known Allergies    ANTIMICROBIALS:  cefTRIAXone   IVPB 1000 every 24 hours      OTHER MEDS:  MEDICATIONS  (STANDING):  acetaminophen     Tablet .. 650 every 6 hours PRN  dexAMETHasone  Injectable 4 every 6 hours  insulin lispro (ADMELOG) corrective regimen sliding scale  three times a day before meals  insulin lispro (ADMELOG) corrective regimen sliding scale  at bedtime  levETIRAcetam 250 <User Schedule>  levETIRAcetam 500 at bedtime  levothyroxine 100 daily  ondansetron Injectable 4 every 6 hours PRN      Vital Signs Last 24 Hrs  T(C): 36.8 (29 Aug 2023 11:54), Max: 36.8 (28 Aug 2023 20:55)  T(F): 98.3 (29 Aug 2023 11:54), Max: 98.3 (29 Aug 2023 11:54)  HR: 68 (29 Aug 2023 11:54) (55 - 68)  BP: 102/64 (29 Aug 2023 11:54) (102/64 - 126/77)  BP(mean): --  RR: 18 (29 Aug 2023 11:54) (18 - 19)  SpO2: 97% (29 Aug 2023 11:54) (94% - 97%)    Parameters below as of 29 Aug 2023 11:54  Patient On (Oxygen Delivery Method): room air        PHYSICAL EXAM:  General: WN/WD NAD, Non-toxic  Neurology: A&Ox3, nonfocal  Respiratory: Clear to auscultation bilaterally  CV: RRR, S1S2, no murmurs, rubs or gallops  Abdominal: Soft, Non-tender, non-distended, normal bowel sounds  Extremities: No edema  Line Sites: Clear  Skin: No rash                          11.4   8.66  )-----------( 316      ( 29 Aug 2023 06:47 )             35.2   WBC Count: 8.66 (08-29 @ 06:47)  WBC Count: 3.62 (08-27 @ 07:15)  WBC Count: 3.86 (08-25 @ 11:22)  WBC Count: 3.79 (08-25 @ 07:01)    MICROBIOLOGY:  .Blood Blood-Peripheral  08-27-23   No growth at 48 Hours  --  --      Clean Catch Clean Catch (Midstream)  08-24-23   10,000 - 49,000 CFU/mL Streptococcus agalactiae (Group B) isolated  Group B streptococci are susceptible to ampicillin,  penicillin and cefazolin, but may be resistant to  erythromycin and clindamycin.  Recommendations for intrapartum prophylaxis for Group B  streptococci are penicillin or ampicillin.  --  --      .Blood Blood-Peripheral  08-24-23   Growth in anaerobic bottle: Escherichia coli  Culture - Blood (08.24.23 @ 10:55)   - Escherichia coli: Detec  Gram Stain:   Growth in anaerobic bottle: Gram Negative Rods  - Amikacin: S <=16  - Ampicillin: R >16 These ampicillin results predict results for amoxicillin  - Ampicillin/Sulbactam: I 16/8  - Aztreonam: S <=4  - Cefazolin: S <=2  - Cefepime: S <=2  - Cefoxitin: S <=8  - Ceftriaxone: S <=1  - Ciprofloxacin: R >2  - Ertapenem: S <=0.5  - Gentamicin: S <=2  - Imipenem: S <=1  - Levofloxacin: R >4  - Meropenem: S <=1  - Piperacillin/Tazobactam: S <=8  - Tobramycin: S <=2  - Trimethoprim/Sulfamethoxazole: S <=0.5/9.5  Specimen Source: .Blood Blood-Peripheral  Organism: Blood Culture PCR  Organism: Escherichia coli      .Blood Blood-Peripheral  08-24-23   No growth at 4 days  --  --    Rapid RVP Result: NotDetec (08-24 @ 12:07)    RADIOLOGY:  < from: US Abdomen Complete (US Abdomen Complete .) (08.29.23 @ 10:10) >  FINDINGS:  Liver: Mildly heterogeneous echotexture, without focal finding.  Bile ducts: Normal caliber. Common bile duct measures 2 mm.  Gallbladder: A 3 mm echogenic focus in the dependent portion of the   gallbladder body, which may be compatible with a small  polyp. No   cholelithiasis. Negative sonographic Duarte's sign. No wall thickening,   gallbladder distention, or pericholecystic fluid.  Pancreas: A 1.6 x 1.5 x 1.7 cm avascular cyst in the distal  pancreatic   body/tail.  Spleen: 10.7 cm. Within normal limits.  Right kidney: 11.6 cm. No hydronephrosis.  Left kidney: 11.8 cm. No hydronephrosis.  Ascites: None.  Aorta and IVC: Visualized portions are within normal limits.  Other: Small bilateral pleural effusions.    IMPRESSION:  A 3 mm echogenic focus in the gallbladder body,  may be compatible with a   polyp. The gallbladder is otherwise unremarkable, without stones.    A 1.6 x 1.5 x 1.7 cm cyst in the pancreatic body/tail. Further evaluation   with contrast-enhanced MRCP is suggested  for more definitive   characterization and assessment of the main pancreatic duct. as cystic   pancreatic neoplasm cannot be excluded.    < end of copied text >      Mike Medrano MD; Division of Infectious Disease; Pager: [phone]; nights and weekends: [phone]

## 2023-08-29 NOTE — DISCHARGE NOTE PROVIDER - NSDCCPCAREPLAN_GEN_ALL_CORE_FT
PRINCIPAL DISCHARGE DIAGNOSIS  Diagnosis: Metastasis to brain  Assessment and Plan of Treatment: Outpatient radiation  treatment; continue with keppra and decadron . Must follow up with hematology oncology and radiation      SECONDARY DISCHARGE DIAGNOSES  Diagnosis: Steroid-induced hyperglycemia  Assessment and Plan of Treatment: please follow up with endocrinology clinic for ongoing care . Please monitor fingersticks and notify PMD or endocrinology if fingersticks 300 or greatere    Diagnosis: Thyroid cancer  Assessment and Plan of Treatment: Postsurgical hypothyroidism-Resume levothyroxine 100 mcg daily  -Recheck TFTs in 4 to 6 weeks, as an outpatient  Discharge planning: She will Dr. Odom  for outpatient follow-up at endocrinology clinic.    Diagnosis: Thyroid cancer  Assessment and Plan of Treatment: follow up with Dr Sharpe at [facility] for treatment     PRINCIPAL DISCHARGE DIAGNOSIS  Diagnosis: Metastasis to brain  Assessment and Plan of Treatment: Outpatient radiation  treatment; continue with keppra and decadron . You are being tapered off decadron. DO NOT STOP STEROIDS ABRUPTLY. you are prescribed protonix to protect your GI system while on steroids. Must follow up with hematology oncology and radiation for SRS.  Please also followup outpatient for an MRI to rule out pancreatic cystic neoplasm      SECONDARY DISCHARGE DIAGNOSES  Diagnosis: Thyroid cancer  Assessment and Plan of Treatment: Postsurgical hypothyroidism-Resume levothyroxine 100 mcg daily  -Recheck TFTs in 4 to 6 weeks, as an outpatient  Discharge planning: She will followup with Dr. Patiño  for outpatient follow-up at endocrinology clinic.  Alos followup up with Dr Sharpe oncology    Diagnosis: Steroid-induced hyperglycemia  Assessment and Plan of Treatment: please follow up with endocrinology clinic for ongoing care . Please monitor fingersticks and notify PMD or endocrinology if fingersticks 300 or greater

## 2023-08-29 NOTE — DISCHARGE NOTE PROVIDER - NSFOLLOWUPCLINICS_GEN_ALL_ED_FT
[facility] Endocrinology  Endocrinology  [address] [place] [address]  Phone: [phone]  Fax:   Established Patient  Follow Up Time: 1 week     Sacrum

## 2023-08-29 NOTE — DISCHARGE NOTE PROVIDER - CARE PROVIDERS DIRECT ADDRESSES
,[email or web address]_Unknown,DirectAddress_Unknown [email or web address]_Unknown,DirectAddress_[email or web address]

## 2023-08-29 NOTE — PROGRESS NOTE ADULT - PROVIDER SPECIALTY LIST ADULT
Cardiology
Hospitalist
Cardiology
Cardiology
Hospitalist
Infectious Disease
Neurosurgery
Endocrinology
Heme/Onc
Hospitalist
Hospitalist
Infectious Disease
Endocrinology

## 2023-08-29 NOTE — PROGRESS NOTE ADULT - REASON FOR ADMISSION
Fever x 4 days

## 2023-08-29 NOTE — DISCHARGE NOTE NURSING/CASE MANAGEMENT/SOCIAL WORK - PATIENT PORTAL LINK FT
You can access the FollowMyHealth Patient Portal offered by [facility] by registering at the following website: [email or web address]. By joining [employer]’s FollowMyHealth portal, you will also be able to view your health information using other applications (apps) compatible with our system.

## 2023-08-29 NOTE — DISCHARGE NOTE PROVIDER - NSDCFUSCHEDAPPT_GEN_ALL_CORE_FT
Inocente Cunningham  [facility]  NEUROSURG [address] [facility]  Scheduled Appointment: 09/05/2023    [facility]  ENDOCRIN [address] [facility]  Scheduled Appointment: 11/20/2023     Nino Marie  [facility]  RADMED [address] [facility]  Scheduled Appointment: 08/30/2023    Inocente Cunningham  [facility]  NEUROSURG [address] [facility]  Scheduled Appointment: 09/05/2023    [facility]  ENDOCRIN [address] [facility]  Scheduled Appointment: 11/20/2023

## 2023-08-29 NOTE — PROGRESS NOTE ADULT - ASSESSMENT
Syncope likely from metastatic Disease / Hypotension from Dehydration/ infection   CT head shows a large area of vasogenic edema is noted in the anterior, inferior   right frontal lobe suspicious for metastatic disease. Also noted is a   partially calcified lesion in the region of the right anterior clinoid.   likely from Metastatic Disease to Brain     MRI Brain shows Dural lesions , Neuro surgery consulted  IV Decadron       Sepsis present on admission   Fevers s/p NICHOLS thyroxine r/o infectious causes   Follow up Blood cx Gram neg Rods Ecoli   Continue with Zosyn   ID consult appreciated       Metastatic Brain Lesion from Thyroid Ca       Hem Onc consulted    Endo consulted     Neuro checks 
    Syncope likely from metastatic Disease / Hypotension from Dehydration/ infection   CT head shows a large area of vasogenic edema is noted in the anterior, inferior   right frontal lobe suspicious for metastatic disease. Also noted is a   partially calcified lesion in the region of the right anterior clinoid.   likely from Metastatic Disease to Brain   r/o Cardiac   Follow up Echo       Fevers s/p NICHOLS thyroxine r/o infectious causes   Follow up Blood cx       Metastatic Brain Lesion Hme Onc eval in [place]   MRI Brain   Neuro checks 
58F Rhanded h/o thyroid CA s/p thyroidectomy 2mo ago, p/f gen weakness/fever/nausea s/p 1st dose NICHOLS. NM scan 8/24 w/ iodine-avid R inf med frontal lesion c/f met, no other lesions. CTH w/ 1.7cm R frontal lesion w/ sig vasogenic edema + addtl partial calcified lesion inferiorly. Exam: Intact, no drift, FS, MURGUIA 5/5, SILT.  - Admitted medicine  - MRI stereo w/wo + DTI showed 2 small dural based lesions, will plan for outpatient SRS  - Dex 10 then 4q6  - 1g Keppra BID  - Already scheduled for MR abdomen w/wo per onc
58F presents for syncope after a thyrogen injection for a NICHOLS scan given for thyroid cancer found to have evidence of brain metastases on imaging.    #Papillary thyroid cancer  - s/p thyroidectomy at [facility] w/ lymph node + dz, found to have iodine-avid R frontal lobe metastasis also seen on MRI brain, residual disease in thyroid bed   - rad onc and NSG following, plan for SRS outpatient  - endocrine following, plan for NICHOLS treatment after SRS  - will send Guardant NGS for actionable mutations as outpatient (this would be more to see for potential 2nd line treatments), pt attempting to obtain original thyroid specimen from [facility] for mutation testing  - outpatient follow up once stable for discharge, please include hematology oncology f/u at [facility] cancer Advance in discharge note  
58F with hyperlipidemia, thyroid cancer status post thyroidectomy 1 month ago admitted 8/24/23 with fever and generalized weakness, found to be febrile 101F, no leukocytosis, CTH with large vasogenic edema in the anterior and inferior R frontal lobe concerning for metastatic disease, complicated by on going fevers (8/25), found to have E coli bacteremia     Denies any abdominal pain, no dysuria, diarrhea  May @023 - had GYN evaluation  - endometrial bx  "endometritis"  took Cipro x 1 week     5/26/23 FNA: "LYMPH NODE", LEVEL 3, RIGHT, US GUIDED FNA: POSITIVE FOR MALIGNANT CELLS.  Papillary thyroid carcinoma.    6/12/2023 THYROIDECTOMY and central neck dissection and right lateral neck dissection revealing multifocal PTC, classic right lobe 1.2 cm, -additional foci of microcarcinoma 0.45 cm and 0.1 cm.  Margins positive . Extrathyroidal extension gross extending only to strap muscles; Angioinvasion negative.; Lymphatic invasion positive. Perineural invasion positive. Lymph nodes 2/30+ lymph nodes right level 2. Extranodal extension negative. Largest metastatic deposit: 1.7    Antibiotics  Zosyn 8/26-->       DIAGNOSIS and IMPRESSION:  E coli Bacteremia  Thyroid cancer  Apparent Metastatic Disease to brain      - unclear source of bacteremia, no localizing symptoms  - no dysuria, abdominal pain, diarrhea  Pt has declined abd CT - NM Thyroid uptake scan does not suggest metastatic disease in abd    RECOMMENDATIONS:  Continue Zosyn 8/26 -->  await  antimicrobial susceptibilities   7 day duration of antibiotics are anticipated   - abd sonogram would be helpful   (- pt declines CT scan due to radiation)    
    Syncope likely from metastatic Disease / Hypotension from Dehydration/ infection   CT head shows a large area of vasogenic edema is noted in the anterior, inferior   right frontal lobe suspicious for metastatic disease. Also noted is a   partially calcified lesion in the region of the right anterior clinoid.   likely from Metastatic Disease to Brain     MRI Brain shows Dural lesions , Neuro surgery consulted  IV Decadron       Sepsis present on admission   Fevers s/p NICHOLS thyroxine r/o infectious causes   Follow up Blood cx Gram neg Rods   Start patient on Zosyn   ID consult       Metastatic Brain Lesion from Thyroid Ca       Hem Onc consulted    Endo consulted     Neuro checks 
58F with hyperlipidemia, thyroid cancer status post thyroidectomy 1 month ago admitted 8/24/23 with fever and generalized weakness, found to be febrile 101F, no leukocytosis, CTH with large vasogenic edema in the anterior and inferior R frontal lobe concerning for metastatic disease, complicated by on going fevers (8/25), found to have E coli bacteremia     Denies any abdominal pain, no dysuria, diarrhea  May @023 - had GYN evaluation  - endometrial bx  "endometritis"  took Cipro x 1 week     5/26/23 FNA: "LYMPH NODE", LEVEL 3, RIGHT, US GUIDED FNA: POSITIVE FOR MALIGNANT CELLS.  Papillary thyroid carcinoma.    6/12/2023 THYROIDECTOMY and central neck dissection and right lateral neck dissection revealing multifocal PTC, classic right lobe 1.2 cm, -additional foci of microcarcinoma 0.45 cm and 0.1 cm.  Margins positive . Extrathyroidal extension gross extending only to strap muscles; Angioinvasion negative.; Lymphatic invasion positive. Perineural invasion positive. Lymph nodes 2/30+ lymph nodes right level 2. Extranodal extension negative. Largest metastatic deposit: 1.7    Antibiotics  Zosyn 8/26--> 8/28  Ceftriaxone 8/28 -->      DIAGNOSIS and IMPRESSION:  E coli Bacteremia +BC 8/24  Thyroid cancer  Apparent Metastatic Disease to brain     no localizing symptoms  - no dysuria, abdominal pain, diarrhea  Pt has declined abd CT - NM Thyroid uptake scan does not suggest metastatic disease in abd  Abd sono with cyst structure in distal pancreatic tail - no source of E coli bacteremia evident  Ceftriaxone has 24 hour duration ---- today's dose will provide 4 days of parenteral antibiotic coverage - pt very anxious to leave hospital -   - unclear source of bacteremia,    RECOMMENDATIONS:  Dose Ceftriaxone today and  continue IV ceftriaxone through 9/1     if she declines MID line and home IV abx --  discharge on po Keflex 1000 mg every 6 hours through 9/5  consider MRI as out-pt to exlcude pancreatic cystic neoplasm  
    Syncope likely from metastatic Disease / Hypotension from Dehydration/ infection   CT head shows a large area of vasogenic edema is noted in the anterior, inferior   right frontal lobe suspicious for metastatic disease. Also noted is a   partially calcified lesion in the region of the right anterior clinoid.   likely from Metastatic Disease to Brain     MRI Brain shows Dural lesions , Neuro surgery consulted  IV Decadron       Sepsis present on admission   Fevers s/p NICHOLS thyroxine r/o infectious causes   Follow up Blood cx Gram neg Rods Ecoli   Zosyn changed to Ceftriaxone   ID consult appreciated       Metastatic Brain Lesion from Thyroid Ca       Hem Onc consulted    Endo consulted     Neuro checks 
The patient is a 58-year-old female with a history of recently diagnosed thyroid cancer, who underwent surgery on 6/12/2023, with total thyroidectomy, central and lateral neck dissection at [facility]. Now with suspected CNS metastases.    1.  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  AJCC [id number].    Case discussed with multidisciplinary team - oncology, neurosurgery, radiation oncology, nuclear medicine.     -Agree with steroids per neurosurgery for vasogenic edema. Options for management will be either resection of this lesion vs SRS with rad-onc. Family would prefer non-invasive management if it is equally effective at treating this. Family is concerned about the MRI saying that high grade meningioma cannot be rule out. I discussed with them that given the iodine avidity of the frontal lobe lesion on the NICHOLS-SPECT CT, and her severe reaction to thyrogen - this lesion is most likely a thyroid cancer metastasis, as we would not expect these things with a meningioma. The only way to definitely find out would be to get a tissue biopsy or resection of the lesion - they will discuss this further with the neurosurgeon to see if biopsy/resection is an option to further characterize the lesion.   -Appreciate input from oncology, neurosurgery and rad-onc. Appreciate primary team and ID for managing her ongoing infection and additional issues.   -Recommend TSH suppression as below, goal TSH <0.1  -We will try to obtain NGS panel from surgical pathology outpatient, send Guardant test per heme-onc  -Any plan for radioactive iodine therapy would be after potential resection of CNS metastasis if it is possible. If planned, would plan to give empiric steroids during treatment with radioactive iodine given possibility of cerebral edema/seizures with therapy.  This would be planned for outpatient, she will follow up with me outpatient.    2.  Postsurgical hypothyroidism  TSH goal <0.1 given EMILEE high risk cancer   -Resume levothyroxine 100 mcg daily  -Recheck TFTs in 4 to 6 weeks, as an outpatient    Discharge planning: She will see me for outpatient follow-up at endocrinology clinic.  Please call/email me with any questions regarding endocrinology plan.    [email or web address]  Cell: [phone] or MAIRA Patiño MD  Attending Physician   Division of Endocrinology, Diabetes and Metabolism   9AM-5PM: Please contact via Microsoft Teams     MALICK 9-5: [email or web address]  After hours and weekends/holidays: [phone]  Please note that this patient may be followed by a different provider tomorrow.   For final dc reccomendations, please call [phone] or page the endocrine fellow on call.  Assistance with non-urgent matters: [email or web address]    >50% of the encounter was spent counseling and/or coordination of care. 60 min spent on total encounter. The necessity of the time spent during the encounter on this date of service was due to development of plan of care/coordination of care, review of inpatient  labs and discharge planning. 
The patient is a 58-year-old female with a history of recently diagnosed thyroid cancer, who underwent surgery on 6/12/2023, with total thyroidectomy, central and lateral neck dissection at [facility]. Now with suspected CNS metastases.    1.  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  AJCC [id number].    Case discussed with multidisciplinary team - oncology, neurosurgery, radiation oncology, nuclear medicine.     -Agree with steroids per neurosurgery for vasogenic edema. Given the MRI findings and corresponding iodine uptake in frontal lobe lesion, this lesion is consistent with thyroid metastasis and needs to be managed by resection or SRS. Family would prefer non-invasive management if it is equally effective at treating this, and the current plan is to pursue SRS as outpatient as per discussion with neurosurgery.   -Appreciate input from oncology, neurosurgery and rad-onc. Appreciate primary team and ID for managing her ongoing infection and additional issues.   -Recommend TSH suppression as below, goal TSH <0.1  -We will try to obtain NGS panel from surgical pathology outpatient, send Guardant test per heme-onc  -Any plan for radioactive iodine therapy would be after potential resection of CNS metastasis if it is possible. If planned, would plan to give empiric steroids during treatment with radioactive iodine given possibility of cerebral edema/seizures with therapy.  This would be planned for outpatient, she will follow up with me outpatient.    2.  Postsurgical hypothyroidism  TSH goal <0.1 given EMILEE high risk cancer   -Resume levothyroxine 100 mcg daily  -Recheck TFTs in 4 to 6 weeks, as an outpatient    3. Steroid induced hyperglycemia  BG up to 254  -Recommend Admelog low dose scales before meals and at bedtime  -Check FSG AC and HS  -Discharge plan would depend on steroid doses at discharge and insulin requirements inpatient. Patient is willing to do insulin sliding scale at home while on steroids, if needed. She will need a glucometer, test strips sent to the pharmacy at discharge. May need insulin and supplies sent depending on if we need to start insulin at discharge.     Discharge planning: She will see me for outpatient follow-up at endocrinology clinic.  Please call/email me with any questions regarding endocrinology plan.    [email or web address]  Cell: [phone] or MAIRA Patiño MD  Attending Physician   Division of Endocrinology, Diabetes and Metabolism   9AM-5PM: Please contact via Microsoft Teams     MALICK 9-5: [email or web address]  After hours and weekends/holidays: [phone]  Please note that this patient may be followed by a different provider tomorrow.   For final dc reccomendations, please call [phone] or page the endocrine fellow on call.  Assistance with non-urgent matters: [email or web address]    >50% of the encounter was spent counseling and/or coordination of care. 36 min spent on total encounter. The necessity of the time spent during the encounter on this date of service was due to development of plan of care/coordination of care, review of inpatient  labs and discharge planning.

## 2023-08-29 NOTE — DISCHARGE NOTE PROVIDER - NSDCFUADDAPPT_GEN_ALL_CORE_FT
Please followup with :  Neurosurgery Dr Cunningham  Endocrine Dr Patiño for your glucose and thyroid managment  Oncology for further treatment i.e SRS and NICHOLS

## 2023-08-29 NOTE — DISCHARGE NOTE NURSING/CASE MANAGEMENT/SOCIAL WORK - NSDCVIVACCINE_GEN_ALL_CORE_FT
Tdap; 24-Aug-2023 11:34; Lorraine Garza (RN); [employer]; 9mg44m9 (Exp. Date: 06-Jun-2025); IntraMuscular; Deltoid Right.; 0.5 milliLiter(s); VIS (VIS Published: 09-May-2013, VIS Presented: 24-Aug-2023);

## 2023-08-29 NOTE — PROGRESS NOTE ADULT - SUBJECTIVE AND OBJECTIVE BOX
Cardiovascular Disease Progress Note  Date of service: 08-29-23 @ 17:03    Overnight events: No acute events overnight.  Pt denies chest pain or SOB  Otherwise review of systems negative    Objective Findings:  T(C): 36.8 (08-29-23 @ 11:54), Max: 36.8 (08-28-23 @ 20:55)  HR: 68 (08-29-23 @ 11:54) (55 - 68)  BP: 102/64 (08-29-23 @ 11:54) (102/64 - 126/77)  RR: 18 (08-29-23 @ 11:54) (18 - 19)  SpO2: 97% (08-29-23 @ 11:54) (94% - 97%)  Wt(kg): --  Daily     Daily       Physical Exam:  Gen: NAD; Patient resting comfortably  HEENT: EOMI, Normocephalic/ atraumatic  CV: RRR, normal S1 + S2, no m/r/g  Lungs:  Normal respiratory effort; clear to auscultation bilaterally  Abd: soft, non-tender; bowel sounds present  Ext: No edema; warm and well perfused    Telemetry: Sinus     Laboratory Data:                        11.4   8.66  )-----------( 316      ( 29 Aug 2023 06:47 )             35.2                     Inpatient Medications:  MEDICATIONS  (STANDING):  cefTRIAXone   IVPB 1000 milliGRAM(s) IV Intermittent every 24 hours  dexAMETHasone  Injectable 4 milliGRAM(s) IV Push every 6 hours  insulin lispro (ADMELOG) corrective regimen sliding scale   SubCutaneous three times a day before meals  insulin lispro (ADMELOG) corrective regimen sliding scale   SubCutaneous at bedtime  levETIRAcetam 250 milliGRAM(s) Oral <User Schedule>  levETIRAcetam 500 milliGRAM(s) Oral at bedtime  levothyroxine 100 MICROGram(s) Oral daily      Assessment:  58-year-old female past medical history hyperlipidemia, thyroid cancer status post thyroidectomy 1 month ago p/w fever, generalized weakness and nausea    Plan of Care:      #Syncope  - Likely 2/2 hypovolemia in setting of fever and vomiting  - Echo 8/25/2023 shows normal LV systolic function with no significant structural abnormalities.   - CT head shows vasogenic edema, concern for metastatic disease. Neurosurgery following. Outpatient follow up for further management.   - EKG shows sinus rhythm with IRBBB and no acute ischemic changes  - No further cardiac work up at this time    #Bacteremia  - Cultures 8/24/2023 positive for Ecoli. Culture 8/27 with no growth to date  - Pt states that her  was hospitalized several weeks ago with sepsis and was found to be growing E coli in his cultures as well.   - Abx as per ID.       #HLD  - Statin therapy    #hypothyroidism  - Synthroid    Discharge planning as per primary team.       #ACP (advance care planning)-  Advanced care planning was discussed with the patient.  Risks, benefits and alternatives of medical treatment and procedures were discussed in detail and all questions were answered. 30 additional minutes spent addressing advance care plans.          Over 25 minutes spent on total encounter; more than 50% of the visit was spent counseling and/or coordinating care by the attending physician.      Hamilton Macario DO [facility]  Cardiovascular Disease  [phone]

## 2023-08-29 NOTE — DISCHARGE NOTE PROVIDER - HOSPITAL COURSE
58F with hyperlipidemia, thyroid cancer status post thyroidectomy 1 month ago admitted 8/24/23 with fever and generalized weakness, found to be febrile 101F, no leukocytosis, CTH with large vasogenic edema in the anterior and inferior R frontal lobe concerning for metastatic disease, complicated by on going fevers (8/25), found to have E coli bacteremia . Patient was seen by infectious disease and recommended Ceftriaxone has 24 hour duration ---- today's dose will provide 4 days of parenteral antibiotic coverage - pt very anxious to leave hospital -  unclear source of bacteremia, Dose Ceftriaxone today and  continue IV ceftriaxone through 9/1;However patient declines MID line and home IV abx --  discharge on po Keflex 1000 mg every 6 hours through 9/5. Patient was seen by hematolgy/oncology Dr Sharpe and indicated - outpatient follow up ;hematology oncology f/u at [facility] .Plan for outpatient RT/SRS.  ;Plan for NICHOLS therapy /endocrine. Patient was seen by endocrinology for post op hypothyroidism and steroid induced hyperglycemia .Resume levothyroxine 100 mcg daily; Recheck TFTs in 4 to 6 weeks, as an outpatient. In regards to hyperglycemia secondary to Steroid induced hyperglycemia  BG up to 250  Patient is willing to do insulin sliding scale at home while on steroids, if needed. She will need a glucometer, test strips sent to the pharmacy at discharge. May need insulin and supplies sent depending on if we need to start insulin at discharge. Discharge planning: follow-up at endocrinology clinic with Dr Odom.           58F with hyperlipidemia, thyroid cancer status post thyroidectomy 1 month ago admitted 8/24/23 with fever and generalized weakness, found to be febrile 101F, no leukocytosis, CTH with large vasogenic edema in the anterior and inferior R frontal lobe concerning for metastatic disease, complicated by on going fevers (8/25), found to have E coli bacteremia . Patient was seen by infectious disease and recommended Ceftriaxone has 24 hour duration ---- today's dose will provide 4 days of parenteral antibiotic coverage - pt very anxious to leave hospital -  unclear source of bacteremia, Dose Ceftriaxone today and  continue IV ceftriaxone through 9/1;However patient declines MID line and home IV abx --  discharge on po Keflex 1000 mg every 6 hours through 9/5. Patient was seen by hematolgy/oncology Dr Sharpe and indicated outpatient follow up ;hematology oncology f/u at [facility] .Plan for outpatient RT/SRS.  ;Plan for NICHOLS therapy with endocrine after SRS. Patient was seen by endocrinology for post op hypothyroidism and steroid induced hyperglycemia .Resume levothyroxine 100 mcg daily; Recheck TFTs in 4 to 6 weeks, as an outpatient. In regards to hyperglycemia secondary to Steroid induced hyperglycemia  BG up to 250  Patient is willing to do insulin sliding scale at home while on steroids, if needed. She will need a glucometer, test strips sent to the pharmacy at discharge. May need insulin and supplies sent depending on if we need to start insulin at discharge. Discharge planning: follow-up at endocrinology clinic with Dr Patiño.    Abdominal sono with cystic structure in distal pancreatic tail - no source of E coli bacteremia evident> consider MRI as out-pt to exlcude pancreatic cystic neoplasm

## 2023-08-30 ENCOUNTER — NON-APPOINTMENT (OUTPATIENT)
Age: 58
End: 2023-08-30

## 2023-08-30 ENCOUNTER — APPOINTMENT (OUTPATIENT)
Dept: RADIATION ONCOLOGY | Facility: CLINIC | Age: 58
End: 2023-08-30
Payer: COMMERCIAL

## 2023-08-30 VITALS
TEMPERATURE: 97.7 F | OXYGEN SATURATION: 98 % | DIASTOLIC BLOOD PRESSURE: 80 MMHG | HEIGHT: 66 IN | WEIGHT: 147.93 LBS | SYSTOLIC BLOOD PRESSURE: 122 MMHG | BODY MASS INDEX: 23.77 KG/M2 | HEART RATE: 60 BPM

## 2023-08-30 PROCEDURE — 99204 OFFICE O/P NEW MOD 45 MIN: CPT | Mod: 25

## 2023-08-30 RX ORDER — DEXAMETHASONE 0.5 MG/5ML
1 ELIXIR ORAL
Qty: 6 | Refills: 0
Start: 2023-08-30 | End: 2023-08-31

## 2023-08-31 ENCOUNTER — APPOINTMENT (OUTPATIENT)
Dept: NEUROSURGERY | Facility: CLINIC | Age: 58
End: 2023-08-31
Payer: COMMERCIAL

## 2023-08-31 ENCOUNTER — NON-APPOINTMENT (OUTPATIENT)
Age: 58
End: 2023-08-31

## 2023-08-31 VITALS
HEIGHT: 66 IN | WEIGHT: 146 LBS | HEART RATE: 54 BPM | SYSTOLIC BLOOD PRESSURE: 119 MMHG | OXYGEN SATURATION: 97 % | TEMPERATURE: 98.6 F | RESPIRATION RATE: 16 BRPM | DIASTOLIC BLOOD PRESSURE: 74 MMHG | BODY MASS INDEX: 23.46 KG/M2

## 2023-08-31 PROCEDURE — 99215 OFFICE O/P EST HI 40 MIN: CPT

## 2023-09-01 LAB
CULTURE RESULTS: SIGNIFICANT CHANGE UP
CULTURE RESULTS: SIGNIFICANT CHANGE UP
SPECIMEN SOURCE: SIGNIFICANT CHANGE UP
SPECIMEN SOURCE: SIGNIFICANT CHANGE UP

## 2023-09-01 RX ORDER — DEXAMETHASONE 0.5 MG/5ML
1 ELIXIR ORAL
Qty: 4 | Refills: 0
Start: 2023-09-01 | End: 2023-09-02

## 2023-09-01 NOTE — HISTORY OF PRESENT ILLNESS
[FreeTextEntry1] : Ms. Jacobs is 58 year old right handed female with newly diagnosed right papillary thyroid cancer s/p total thyroidectomy and right neck lymph node dissection at [facility] 6/12/23 with positive inked margins and NICHOLS, who presents with newly diagnosed brain metastases detected after syncope, for consdieration of GK SRS.   The course of her illness began in April 2023, with U/S done suspicious for cancer in right thyroid nodule w/ right positive cervical adenopathy, s/p FNA bx 5/26/23 showing papillary thyroid cancer, CT neck w/ IV contrast 6/8/23 for presurgical eval w/o other areas of lymphadenopathy s/p total thyroidectomy and right neck lymph node dissection 6/12/23 at [facility] showing stage 2 papillary thyroid cancer (pT3b, pN1b) w/ positive inked surgical margins. She was initially seen by  and team and then referred to Dr. Patiño of endocrinology, who recommended continuing Levothyroxine and assessing NICHOLS uptake.   She received Thyrogen injections 8/21 and 8/22 she developed nausea, fatigue and fevers controlled with tylenol. This ultimately progressed to an episode of vasovagal syncope that led to her falling on her face and developing a laceration. EMS called and she went to [facility]. MRI brain there 8/25/23 ultimately revealed a 1.7 cm iodine avid right frontal extra-axial lesion, as well as a 0.8 cm dural based lesion along the right anterior clinoid process.

## 2023-09-03 RX ORDER — DEXAMETHASONE 0.5 MG/5ML
1 ELIXIR ORAL
Qty: 4 | Refills: 0
Start: 2023-09-03 | End: 2023-09-04

## 2023-09-05 ENCOUNTER — OUTPATIENT (OUTPATIENT)
Dept: OUTPATIENT SERVICES | Facility: HOSPITAL | Age: 58
LOS: 1 days | End: 2023-09-05
Payer: COMMERCIAL

## 2023-09-05 ENCOUNTER — APPOINTMENT (OUTPATIENT)
Dept: NEUROSURGERY | Facility: CLINIC | Age: 58
End: 2023-09-05

## 2023-09-05 VITALS
HEIGHT: 66 IN | TEMPERATURE: 98 F | OXYGEN SATURATION: 99 % | SYSTOLIC BLOOD PRESSURE: 105 MMHG | WEIGHT: 136.03 LBS | HEART RATE: 58 BPM | RESPIRATION RATE: 14 BRPM | DIASTOLIC BLOOD PRESSURE: 71 MMHG

## 2023-09-05 DIAGNOSIS — Z01.818 ENCOUNTER FOR OTHER PREPROCEDURAL EXAMINATION: ICD-10-CM

## 2023-09-05 DIAGNOSIS — Z98.890 OTHER SPECIFIED POSTPROCEDURAL STATES: Chronic | ICD-10-CM

## 2023-09-05 DIAGNOSIS — G93.89 OTHER SPECIFIED DISORDERS OF BRAIN: ICD-10-CM

## 2023-09-05 DIAGNOSIS — Z98.51 TUBAL LIGATION STATUS: Chronic | ICD-10-CM

## 2023-09-05 DIAGNOSIS — E89.0 POSTPROCEDURAL HYPOTHYROIDISM: Chronic | ICD-10-CM

## 2023-09-05 DIAGNOSIS — Z29.9 ENCOUNTER FOR PROPHYLACTIC MEASURES, UNSPECIFIED: ICD-10-CM

## 2023-09-05 LAB
ANION GAP SERPL CALC-SCNC: 12 MMOL/L — SIGNIFICANT CHANGE UP (ref 5–17)
BLD GP AB SCN SERPL QL: NEGATIVE — SIGNIFICANT CHANGE UP
BUN SERPL-MCNC: 19 MG/DL — SIGNIFICANT CHANGE UP (ref 7–23)
CALCIUM SERPL-MCNC: 9.9 MG/DL — SIGNIFICANT CHANGE UP (ref 8.4–10.5)
CHLORIDE SERPL-SCNC: 99 MMOL/L — SIGNIFICANT CHANGE UP (ref 96–108)
CO2 SERPL-SCNC: 25 MMOL/L — SIGNIFICANT CHANGE UP (ref 22–31)
CREAT SERPL-MCNC: 0.62 MG/DL — SIGNIFICANT CHANGE UP (ref 0.5–1.3)
EGFR: 103 ML/MIN/1.73M2 — SIGNIFICANT CHANGE UP
GLUCOSE SERPL-MCNC: 105 MG/DL — HIGH (ref 70–99)
HCT VFR BLD CALC: 42.6 % — SIGNIFICANT CHANGE UP (ref 34.5–45)
HGB BLD-MCNC: 13.7 G/DL — SIGNIFICANT CHANGE UP (ref 11.5–15.5)
MCHC RBC-ENTMCNC: 26.8 PG — LOW (ref 27–34)
MCHC RBC-ENTMCNC: 32.2 GM/DL — SIGNIFICANT CHANGE UP (ref 32–36)
MCV RBC AUTO: 83.2 FL — SIGNIFICANT CHANGE UP (ref 80–100)
MRSA PCR RESULT.: SIGNIFICANT CHANGE UP
NRBC # BLD: 0 /100 WBCS — SIGNIFICANT CHANGE UP (ref 0–0)
PLATELET # BLD AUTO: 372 K/UL — SIGNIFICANT CHANGE UP (ref 150–400)
POTASSIUM SERPL-MCNC: 4.7 MMOL/L — SIGNIFICANT CHANGE UP (ref 3.5–5.3)
POTASSIUM SERPL-SCNC: 4.7 MMOL/L — SIGNIFICANT CHANGE UP (ref 3.5–5.3)
RBC # BLD: 5.12 M/UL — SIGNIFICANT CHANGE UP (ref 3.8–5.2)
RBC # FLD: 12.8 % — SIGNIFICANT CHANGE UP (ref 10.3–14.5)
RH IG SCN BLD-IMP: POSITIVE — SIGNIFICANT CHANGE UP
S AUREUS DNA NOSE QL NAA+PROBE: SIGNIFICANT CHANGE UP
SODIUM SERPL-SCNC: 136 MMOL/L — SIGNIFICANT CHANGE UP (ref 135–145)
WBC # BLD: 8.1 K/UL — SIGNIFICANT CHANGE UP (ref 3.8–10.5)
WBC # FLD AUTO: 8.1 K/UL — SIGNIFICANT CHANGE UP (ref 3.8–10.5)

## 2023-09-05 PROCEDURE — 85027 COMPLETE CBC AUTOMATED: CPT

## 2023-09-05 PROCEDURE — 80048 BASIC METABOLIC PNL TOTAL CA: CPT

## 2023-09-05 PROCEDURE — 86901 BLOOD TYPING SEROLOGIC RH(D): CPT

## 2023-09-05 PROCEDURE — 87641 MR-STAPH DNA AMP PROBE: CPT

## 2023-09-05 PROCEDURE — 86900 BLOOD TYPING SEROLOGIC ABO: CPT

## 2023-09-05 PROCEDURE — 87640 STAPH A DNA AMP PROBE: CPT

## 2023-09-05 PROCEDURE — 86850 RBC ANTIBODY SCREEN: CPT

## 2023-09-05 PROCEDURE — G0463: CPT

## 2023-09-05 RX ORDER — DEXAMETHASONE 0.5 MG/5ML
1 ELIXIR ORAL
Qty: 1 | Refills: 0
Start: 2023-09-05 | End: 2023-09-05

## 2023-09-05 NOTE — H&P PST ADULT - ASSESSMENT
denies loose or removable teeth  CAPRINI VTE 2.0 SCORE [CLOT updated 2019]    AGE RELATED RISK FACTORS                                                       MOBILITY RELATED FACTORS  [x] Age 41-60 years                                            (1 Point)                    [ ] Bed rest                                                        (1 Point)  [ ] Age: 61-74 years                                           (2 Points)                  [ ] Plaster cast                                                   (2 Points)  [ ] Age= 75 years                                              (3 Points)                    [ ] Bed bound for more than 72 hours                 (2 Points)    DISEASE RELATED RISK FACTORS                                               GENDER SPECIFIC FACTORS  [ ] Edema in the lower extremities                       (1 Point)              [ ] Pregnancy                                                     (1 Point)  [ ] Varicose veins                                               (1 Point)                     [ ] Post-partum < 6 weeks                                   (1 Point)             [ ] BMI > 25 Kg/m2                                            (1 Point)                     [ ] Hormonal therapy  or oral contraception          (1 Point)                 [ ] Sepsis (in the previous month)                        (1 Point)               [ ] History of pregnancy complications                 (1 point)  [ ] Pneumonia or serious lung disease                                               [ ] Unexplained or recurrent                     (1 Point)           (in the previous month)                               (1 Point)  [ ] Abnormal pulmonary function test                     (1 Point)                 SURGERY RELATED RISK FACTORS  [ ] Acute myocardial infarction                              (1 Point)               [ ]  Section                                             (1 Point)  [ ] Congestive heart failure (in the previous month)  (1 Point)      [ ] Minor surgery                                                  (1 Point)   [ ] Inflammatory bowel disease                             (1 Point)               [ ] Arthroscopic surgery                                        (2 Points)  [ ] Central venous access                                      (2 Points)                [x] General surgery lasting more than 45 minutes (2 points)  [ x Malignancy- Present or previous                   (2 Points)                [ ] Elective arthroplasty                                         (5 points)    [ ] Stroke (in the previous month)                          (5 Points)                                                                                                                                                           HEMATOLOGY RELATED FACTORS                                                 TRAUMA RELATED RISK FACTORS  [ ] Prior episodes of VTE                                     (3 Points)                [ ] Fracture of the hip, pelvis, or leg                       (5 Points)  [ ] Positive family history for VTE                         (3 Points)             [ ] Acute spinal cord injury (in the previous month)  (5 Points)  [ ] Prothrombin 92660 A                                     (3 Points)               [ ] Paralysis  (less than 1 month)                             (5 Points)  [ ] Factor V Leiden                                             (3 Points)                  [ ] Multiple Trauma within 1 month                        (5 Points)  [ ] Lupus anticoagulants                                     (3 Points)                                                           [ ] Anticardiolipin antibodies                               (3 Points)                                                       [ ] High homocysteine in the blood                      (3 Points)                                             [ ] Other congenital or acquired thrombophilia      (3 Points)                                                [ ] Heparin induced thrombocytopenia                  (3 Points)                                     Total Score [  5  ]

## 2023-09-05 NOTE — H&P PST ADULT - PROBLEM SELECTOR PLAN 1
Right Frontal Craniotomy for Tumor Resection  -cbc, bmp, type & screen, mrsa/mssa swab @ PST  -echocardiogram 8/2023 on chart  -preop instructions provided  -ABO on admit Right Frontal Craniotomy for Tumor Resection  -cbc, bmp, type & screen, mrsa/mssa swab @ PST  -echocardiogram 8/2023 on chart  -preop instructions provided.  All questions answered  -ABO on admit

## 2023-09-05 NOTE — H&P PST ADULT - HISTORY OF PRESENT ILLNESS
58 year old right handed female PMH of with newly diagnosed Right Papillary Thyroid  Cancer,  S/P Total Thyroidectomy and Right Neck Lymph Node Dissection at [facility] on 6/12/23,  who presents with newly diagnosed Brain Tumor. Patient endorses Brain lesion was  incidentally detected after she presented to [facility] on 8/24 with syncopal episode, fever, and generalized weakness.  Patient states that while hospitalized she underwent imaging of the Brain which revealed large vasogenic edema in the anterior and inferior R frontal lobe concerning for metastatic disease. Her hospital course was complicated as she was also noted to have E coli bacteremia. Patient reports she is currently on Cephalexin. She denies sob, chest pain, palpitations,  fever, chills, nausea/vomiting, headaches, seizure like activity, visual, motor or sensory changes. She presents to PST today for evaluation prior to scheduled Right Frontal Craniotomy on 9/7/2023.     58 year old right handed female with PMH of newly diagnosed Right Papillary Thyroid  Cancer, S/P Total Thyroidectomy and Right Neck Lymph Node Dissection at [facility] on 6/12/23,  who presents with Brain Tumor. Patient endorses Brain lesion was incidentally found after she presented to [facility] on 8/24 with syncopal episode, fever, and generalized weakness.  Patient states that while hospitalized she underwent imaging of the Brain which revealed large vasogenic edema in the anterior and inferior Right frontal lobe concerning for metastatic disease. Her hospital course was also complicated by E coli bacteremia (patient  is currently on Cephalexin). She denies sob, chest pain, palpitations,  fever, chills, nausea/vomiting, headaches, seizure like activity, visual, motor or sensory changes. She presents to PST today for evaluation prior to scheduled Right Frontal Craniotomy on 9/7/2023.

## 2023-09-05 NOTE — H&P PST ADULT - NSICDXPASTSURGICALHX_GEN_ALL_CORE_FT
PAST SURGICAL HISTORY:  H/O benign breast biopsy Left, 2015    H/O colonoscopy     H/O total thyroidectomy     H/O tubal ligation     S/P endometrial ablation

## 2023-09-05 NOTE — H&P PST ADULT - MUSCULOSKELETAL
negative ROM intact/no joint swelling/no joint erythema/no joint warmth/no calf tenderness/normal gait/strength 5/5 bilateral upper extremities

## 2023-09-05 NOTE — H&P PST ADULT - ATTENDING COMMENTS
The patient has been treated recently with thyroid ca with thyroidectomy and first dose of radioactive iodine. She had a recent episode of LOC that almost definitely was a vasovagal event. MRI showed a right 18 mm anteromedial inferior extraaxial lesion with edema, and a smaller right suprarorbital extraaxial lesion. These are most likely meningiomas but resection of the larger tumor is indicated to rule out metastatic ca. If that proves to be a meningioma then the supraorbital one certainly is.    P) Right craniotomy for tumor, and placement of lumbar drain. I have discussed the risks, benefits, and alternatives to surgery with the patient and her family, and answered all questions. In particular, the risks of bleeding, infection, seizures, and CSF leak were discussed.

## 2023-09-06 ENCOUNTER — TRANSCRIPTION ENCOUNTER (OUTPATIENT)
Age: 58
End: 2023-09-06

## 2023-09-07 ENCOUNTER — APPOINTMENT (OUTPATIENT)
Dept: NEUROSURGERY | Facility: HOSPITAL | Age: 58
End: 2023-09-07

## 2023-09-07 ENCOUNTER — RESULT REVIEW (OUTPATIENT)
Age: 58
End: 2023-09-07

## 2023-09-07 ENCOUNTER — INPATIENT (INPATIENT)
Facility: HOSPITAL | Age: 58
LOS: 2 days | Discharge: HOME | DRG: 25 | End: 2023-09-10
Attending: NEUROLOGICAL SURGERY | Admitting: NEUROLOGICAL SURGERY
Payer: COMMERCIAL

## 2023-09-07 VITALS
DIASTOLIC BLOOD PRESSURE: 64 MMHG | SYSTOLIC BLOOD PRESSURE: 101 MMHG | HEART RATE: 74 BPM | HEIGHT: 66 IN | TEMPERATURE: 99 F | OXYGEN SATURATION: 98 % | WEIGHT: 136.03 LBS | RESPIRATION RATE: 16 BRPM

## 2023-09-07 DIAGNOSIS — Z98.890 OTHER SPECIFIED POSTPROCEDURAL STATES: Chronic | ICD-10-CM

## 2023-09-07 DIAGNOSIS — G93.89 OTHER SPECIFIED DISORDERS OF BRAIN: ICD-10-CM

## 2023-09-07 DIAGNOSIS — E89.0 POSTPROCEDURAL HYPOTHYROIDISM: Chronic | ICD-10-CM

## 2023-09-07 DIAGNOSIS — Z98.51 TUBAL LIGATION STATUS: Chronic | ICD-10-CM

## 2023-09-07 LAB
ANION GAP SERPL CALC-SCNC: 13 MMOL/L — SIGNIFICANT CHANGE UP (ref 5–17)
BUN SERPL-MCNC: 11 MG/DL — SIGNIFICANT CHANGE UP (ref 7–23)
CALCIUM SERPL-MCNC: 7.6 MG/DL — LOW (ref 8.4–10.5)
CHLORIDE SERPL-SCNC: 103 MMOL/L — SIGNIFICANT CHANGE UP (ref 96–108)
CO2 SERPL-SCNC: 21 MMOL/L — LOW (ref 22–31)
CREAT SERPL-MCNC: 0.53 MG/DL — SIGNIFICANT CHANGE UP (ref 0.5–1.3)
EGFR: 107 ML/MIN/1.73M2 — SIGNIFICANT CHANGE UP
GLUCOSE BLDC GLUCOMTR-MCNC: 135 MG/DL — HIGH (ref 70–99)
GLUCOSE SERPL-MCNC: 143 MG/DL — HIGH (ref 70–99)
HCT VFR BLD CALC: 36.3 % — SIGNIFICANT CHANGE UP (ref 34.5–45)
HGB BLD-MCNC: 11.8 G/DL — SIGNIFICANT CHANGE UP (ref 11.5–15.5)
MCHC RBC-ENTMCNC: 27 PG — SIGNIFICANT CHANGE UP (ref 27–34)
MCHC RBC-ENTMCNC: 32.5 GM/DL — SIGNIFICANT CHANGE UP (ref 32–36)
MCV RBC AUTO: 83.1 FL — SIGNIFICANT CHANGE UP (ref 80–100)
NRBC # BLD: 0 /100 WBCS — SIGNIFICANT CHANGE UP (ref 0–0)
PLATELET # BLD AUTO: 239 K/UL — SIGNIFICANT CHANGE UP (ref 150–400)
POTASSIUM SERPL-MCNC: 3.5 MMOL/L — SIGNIFICANT CHANGE UP (ref 3.5–5.3)
POTASSIUM SERPL-SCNC: 3.5 MMOL/L — SIGNIFICANT CHANGE UP (ref 3.5–5.3)
RBC # BLD: 4.37 M/UL — SIGNIFICANT CHANGE UP (ref 3.8–5.2)
RBC # FLD: 13 % — SIGNIFICANT CHANGE UP (ref 10.3–14.5)
SODIUM SERPL-SCNC: 137 MMOL/L — SIGNIFICANT CHANGE UP (ref 135–145)
WBC # BLD: 11.57 K/UL — HIGH (ref 3.8–10.5)
WBC # FLD AUTO: 11.57 K/UL — HIGH (ref 3.8–10.5)

## 2023-09-07 PROCEDURE — 69990 MICROSURGERY ADD-ON: CPT | Mod: 59

## 2023-09-07 PROCEDURE — 88341 IMHCHEM/IMCYTCHM EA ADD ANTB: CPT | Mod: 26

## 2023-09-07 PROCEDURE — 88342 IMHCHEM/IMCYTCHM 1ST ANTB: CPT | Mod: 26

## 2023-09-07 PROCEDURE — 88334 PATH CONSLTJ SURG CYTO XM EA: CPT | Mod: 26,59

## 2023-09-07 PROCEDURE — 88331 PATH CONSLTJ SURG 1 BLK 1SPC: CPT | Mod: 26

## 2023-09-07 PROCEDURE — 88307 TISSUE EXAM BY PATHOLOGIST: CPT | Mod: 26

## 2023-09-07 PROCEDURE — 99233 SBSQ HOSP IP/OBS HIGH 50: CPT

## 2023-09-07 PROCEDURE — 61781 SCAN PROC CRANIAL INTRA: CPT

## 2023-09-07 PROCEDURE — 61512 CRNEC TREPH EXC MNGIOMA STTL: CPT

## 2023-09-07 PROCEDURE — 62272 THER SPI PNXR DRG CSF: CPT

## 2023-09-07 RX ORDER — HYDROMORPHONE HYDROCHLORIDE 2 MG/ML
0.5 INJECTION INTRAMUSCULAR; INTRAVENOUS; SUBCUTANEOUS
Refills: 0 | Status: DISCONTINUED | OUTPATIENT
Start: 2023-09-07 | End: 2023-09-07

## 2023-09-07 RX ORDER — CALCIUM GLUCONATE 100 MG/ML
2 VIAL (ML) INTRAVENOUS ONCE
Refills: 0 | Status: COMPLETED | OUTPATIENT
Start: 2023-09-07 | End: 2023-09-07

## 2023-09-07 RX ORDER — NAFCILLIN 10 G/100ML
2 INJECTION, POWDER, FOR SOLUTION INTRAVENOUS EVERY 4 HOURS
Refills: 0 | Status: COMPLETED | OUTPATIENT
Start: 2023-09-07 | End: 2023-09-08

## 2023-09-07 RX ORDER — SENNA PLUS 8.6 MG/1
2 TABLET ORAL AT BEDTIME
Refills: 0 | Status: DISCONTINUED | OUTPATIENT
Start: 2023-09-07 | End: 2023-09-10

## 2023-09-07 RX ORDER — LEVETIRACETAM 250 MG/1
500 TABLET, FILM COATED ORAL EVERY 12 HOURS
Refills: 0 | Status: DISCONTINUED | OUTPATIENT
Start: 2023-09-07 | End: 2023-09-08

## 2023-09-07 RX ORDER — POTASSIUM CHLORIDE 20 MEQ
40 PACKET (EA) ORAL EVERY 4 HOURS
Refills: 0 | Status: COMPLETED | OUTPATIENT
Start: 2023-09-07 | End: 2023-09-07

## 2023-09-07 RX ORDER — HYDROMORPHONE HYDROCHLORIDE 2 MG/ML
0.25 INJECTION INTRAMUSCULAR; INTRAVENOUS; SUBCUTANEOUS
Refills: 0 | Status: DISCONTINUED | OUTPATIENT
Start: 2023-09-07 | End: 2023-09-07

## 2023-09-07 RX ORDER — OXYCODONE HYDROCHLORIDE 5 MG/1
5 TABLET ORAL EVERY 4 HOURS
Refills: 0 | Status: DISCONTINUED | OUTPATIENT
Start: 2023-09-07 | End: 2023-09-10

## 2023-09-07 RX ORDER — HYDROMORPHONE HYDROCHLORIDE 2 MG/ML
0.25 INJECTION INTRAMUSCULAR; INTRAVENOUS; SUBCUTANEOUS ONCE
Refills: 0 | Status: DISCONTINUED | OUTPATIENT
Start: 2023-09-07 | End: 2023-09-09

## 2023-09-07 RX ORDER — SODIUM CHLORIDE 9 MG/ML
1000 INJECTION INTRAMUSCULAR; INTRAVENOUS; SUBCUTANEOUS
Refills: 0 | Status: DISCONTINUED | OUTPATIENT
Start: 2023-09-07 | End: 2023-09-08

## 2023-09-07 RX ORDER — ONDANSETRON 8 MG/1
4 TABLET, FILM COATED ORAL EVERY 6 HOURS
Refills: 0 | Status: DISCONTINUED | OUTPATIENT
Start: 2023-09-07 | End: 2023-09-10

## 2023-09-07 RX ORDER — ACETAMINOPHEN 500 MG
1000 TABLET ORAL EVERY 6 HOURS
Refills: 0 | Status: DISCONTINUED | OUTPATIENT
Start: 2023-09-07 | End: 2023-09-09

## 2023-09-07 RX ORDER — APREPITANT 80 MG/1
40 CAPSULE ORAL ONCE
Refills: 0 | Status: COMPLETED | OUTPATIENT
Start: 2023-09-07 | End: 2023-09-07

## 2023-09-07 RX ORDER — LEVOTHYROXINE SODIUM 125 MCG
100 TABLET ORAL DAILY
Refills: 0 | Status: DISCONTINUED | OUTPATIENT
Start: 2023-09-08 | End: 2023-09-10

## 2023-09-07 RX ORDER — ONDANSETRON 8 MG/1
4 TABLET, FILM COATED ORAL ONCE
Refills: 0 | Status: DISCONTINUED | OUTPATIENT
Start: 2023-09-07 | End: 2023-09-07

## 2023-09-07 RX ORDER — DEXAMETHASONE 0.5 MG/5ML
4 ELIXIR ORAL EVERY 6 HOURS
Refills: 0 | Status: DISCONTINUED | OUTPATIENT
Start: 2023-09-07 | End: 2023-09-08

## 2023-09-07 RX ORDER — LIDOCAINE HCL 20 MG/ML
0.2 VIAL (ML) INJECTION ONCE
Refills: 0 | Status: DISCONTINUED | OUTPATIENT
Start: 2023-09-07 | End: 2023-09-07

## 2023-09-07 RX ORDER — SODIUM CHLORIDE 9 MG/ML
3 INJECTION INTRAMUSCULAR; INTRAVENOUS; SUBCUTANEOUS EVERY 8 HOURS
Refills: 0 | Status: DISCONTINUED | OUTPATIENT
Start: 2023-09-07 | End: 2023-09-07

## 2023-09-07 RX ORDER — PANTOPRAZOLE SODIUM 20 MG/1
40 TABLET, DELAYED RELEASE ORAL DAILY
Refills: 0 | Status: DISCONTINUED | OUTPATIENT
Start: 2023-09-07 | End: 2023-09-08

## 2023-09-07 RX ORDER — CEFAZOLIN SODIUM 1 G
2000 VIAL (EA) INJECTION ONCE
Refills: 0 | Status: COMPLETED | OUTPATIENT
Start: 2023-09-07 | End: 2023-09-07

## 2023-09-07 RX ORDER — CHLORHEXIDINE GLUCONATE 213 G/1000ML
1 SOLUTION TOPICAL DAILY
Refills: 0 | Status: DISCONTINUED | OUTPATIENT
Start: 2023-09-07 | End: 2023-09-07

## 2023-09-07 RX ADMIN — Medication 40 MILLIEQUIVALENT(S): at 21:25

## 2023-09-07 RX ADMIN — NAFCILLIN 200 GRAM(S): 10 INJECTION, POWDER, FOR SOLUTION INTRAVENOUS at 19:40

## 2023-09-07 RX ADMIN — SODIUM CHLORIDE 3 MILLILITER(S): 9 INJECTION INTRAMUSCULAR; INTRAVENOUS; SUBCUTANEOUS at 07:11

## 2023-09-07 RX ADMIN — NAFCILLIN 200 GRAM(S): 10 INJECTION, POWDER, FOR SOLUTION INTRAVENOUS at 14:45

## 2023-09-07 RX ADMIN — SODIUM CHLORIDE 75 MILLILITER(S): 9 INJECTION INTRAMUSCULAR; INTRAVENOUS; SUBCUTANEOUS at 16:24

## 2023-09-07 RX ADMIN — LEVETIRACETAM 400 MILLIGRAM(S): 250 TABLET, FILM COATED ORAL at 17:31

## 2023-09-07 RX ADMIN — HYDROMORPHONE HYDROCHLORIDE 0.25 MILLIGRAM(S): 2 INJECTION INTRAMUSCULAR; INTRAVENOUS; SUBCUTANEOUS at 14:00

## 2023-09-07 RX ADMIN — Medication 40 MILLIEQUIVALENT(S): at 15:33

## 2023-09-07 RX ADMIN — Medication 200 GRAM(S): at 15:26

## 2023-09-07 RX ADMIN — SODIUM CHLORIDE 75 MILLILITER(S): 9 INJECTION INTRAMUSCULAR; INTRAVENOUS; SUBCUTANEOUS at 19:22

## 2023-09-07 RX ADMIN — NAFCILLIN 200 GRAM(S): 10 INJECTION, POWDER, FOR SOLUTION INTRAVENOUS at 23:32

## 2023-09-07 RX ADMIN — APREPITANT 40 MILLIGRAM(S): 80 CAPSULE ORAL at 07:23

## 2023-09-07 RX ADMIN — Medication 4 MILLIGRAM(S): at 17:32

## 2023-09-07 RX ADMIN — HYDROMORPHONE HYDROCHLORIDE 0.25 MILLIGRAM(S): 2 INJECTION INTRAMUSCULAR; INTRAVENOUS; SUBCUTANEOUS at 13:45

## 2023-09-07 NOTE — PROGRESS NOTE ADULT - SUBJECTIVE AND OBJECTIVE BOX
HOSPITAL COURSE: 59 YO F with PMHx of newly diagnosed Right Papillary Thyroid  Cancer, S/P Total Thyroidectomy and Right Neck Lymph Node Dissection at [facility] on 6/12/23,  who presents for a b/l frontal craniotomy for excision of right frontal menignioma. On 8/24 patient had a  syncopal episode, fever, and generalized weakness. During that hospitalization patient had E coli bacteremia. Patient denies any CP/SOB/N/V/D or any urinary/bowel abnormalities     24 hour Events:     09/07: Right frontal craniotomy & Simpsons grade 1 excision of Right frontal meningioma, Lumbar drain inserted preoperatively  Frontal craniotomy encompassing bilateral frontal sinuses. Frozen: meningioma    Allergies    No Known Allergies    Intolerances    REVIEW OF SYSTEMS: [ ] Unable to Assess due to neurologic exam   [x ] All ROS addressed below are non-contributory, except:  Neuro: [x ] Headache [ ] Back pain [ ] Numbness [ ] Weakness [ ] Ataxia [ ] Dizziness [ ] Aphasia [ ] Dysarthria [ ] Visual disturbance  Resp: [ ] Shortness of breath/dyspnea, [ ] Orthopnea [ ] Cough  CV: [ ] Chest pain [ ] Palpitation [ ] Lightheadedness [ ] Syncope  Renal: [ ] Thirst [ ] Edema  GI: [ ] Nausea [ ] Emesis [ ] Abdominal pain [ ] Constipation [ ] Diarrhea  Hem: [ ] Hematemesis [ ] bright red blood per rectum  ID: [ ] Fever [ ] Chills [ ] Dysuria  ENT: [ ] Rhinorrhea    DEVICES:   [ ] Restraints [ ] ET tube [ ] central line [ ] arterial line [ ] strange [ ] NGT/OGT [ ] EVD [ ] LD [x ] FADY/HMV [ ] Trach [ ] PEG [ ] Chest Tube     VITALS:   Vital Signs Last 24 Hrs  ICU Vital Signs Last 24 Hrs  T(C): 36.5 (07 Sep 2023 19:00), Max: 37 (07 Sep 2023 06:43)  T(F): 97.7 (07 Sep 2023 19:00), Max: 98.6 (07 Sep 2023 06:43)  HR: 56 (07 Sep 2023 21:00) (52 - 80)  BP: 135/65 (07 Sep 2023 17:00) (101/64 - 137/68)  BP(mean): 92 (07 Sep 2023 17:00) (83 - 96)  ABP: 113/55 (07 Sep 2023 21:00) (103/86 - 162/75)  ABP(mean): 77 (07 Sep 2023 21:00) (74 - 126)  RR: 13 (07 Sep 2023 21:00) (13 - 18)  SpO2: 99% (07 Sep 2023 21:00) (96% - 100%)    O2 Parameters below as of 07 Sep 2023 19:00  Patient On (Oxygen Delivery Method): room air      CAPILLARY BLOOD GLUCOSE      POCT Blood Glucose.: 135 mg/dL (07 Sep 2023 07:02)    I&O's Summary  I&O's Detail    07 Sep 2023 07:01  -  07 Sep 2023 22:13  --------------------------------------------------------  IN:    Oral Fluid: 240 mL    sodium chloride 0.9%: 675 mL  Total IN: 915 mL    OUT:      Indwelling Catheter - Urethral (mL): 1115 mL  Total OUT: 1115 mL    Total NET: -200 mL      LABS:                        11.8   11.57 )-----------( 239      ( 07 Sep 2023 13:45 )             36.3     09-07    137  |  103  |  11  ----------------------------<  143<H>  3.5   |  21<L>  |  0.53        MEDICATION LEVELS:   MEDICATIONS  (STANDING):  dexAMETHasone  Injectable 4 milliGRAM(s) IV Push every 6 hours  HYDROmorphone  Injectable 0.25 milliGRAM(s) IV Push once  levETIRAcetam  IVPB 500 milliGRAM(s) IV Intermittent every 12 hours  nafcillin  IVPB 2 Gram(s) IV Intermittent every 4 hours  pantoprazole  Injectable 40 milliGRAM(s) IV Push daily  senna 2 Tablet(s) Oral at bedtime  sodium chloride 0.9%. 1000 milliLiter(s) (75 mL/Hr) IV Continuous <Continuous>    MEDICATIONS  (PRN):  acetaminophen   IVPB .. 1000 milliGRAM(s) IV Intermittent every 6 hours PRN Severe Pain (7 - 10)  ondansetron Injectable 4 milliGRAM(s) IV Push every 6 hours PRN Nausea and/or Vomiting  oxyCODONE    IR 5 milliGRAM(s) Oral every 4 hours PRN Moderate Pain (4 - 6)      IMAGING:  CT brain tomorrow    EXAMINATION:  PHYSICAL EXAM:    Constitutional: No Acute Distress     Neurological: Awake, alert oriented to person, place and time, Following Commands, PERRL, EOMI, No Gaze Preference, Face Symmetrical, Speech Fluent, No dysmetria, No ataxia, No nystagmus     Motor exam:          Upper extremity                         Delt     Bicep     Tricep    HG                                                 R         5/5 5/5 5/5 5/5                                               L          5/5 5/5 5/5 5/5          Lower extremity                        HF         KF        KE       DF         PF                                                  R        5/5 5/5 5/5 5/5         5/5                                               L         5/5 5/5 5/5 5/5          5/5    Pulmonary: Clear to Auscultation, No rales, No rhonchi, No wheezes     Cardiovascular: S1, S2, Regular rate and rhythm     Gastrointestinal: Soft, Non-tender, Non-distended     Extremities: No calf tenderness     Wound is clean. No signs of CSF leak

## 2023-09-07 NOTE — PATIENT PROFILE ADULT - FALL HARM RISK - HARM RISK INTERVENTIONS

## 2023-09-07 NOTE — PROGRESS NOTE ADULT - SUBJECTIVE AND OBJECTIVE BOX
HOSPITAL COURSE: 59 YO F with PMHx of newly diagnosed Right Papillary Thyroid  Cancer, S/P Total Thyroidectomy and Right Neck Lymph Node Dissection at [facility] on 6/12/23,  who presents for a b/l frontal craniotomy for excision of right frontal menignioma. On 8/24 patient had a  syncopal episode, fever, and generalized weakness. During that hospitalization patient had E coli bacteremia. Patient denies any CP/SOB/N/V/D or any urinary/bowel abnormalities     Admission Scores  GCS:   HH:   MF:   NIHSS:   RASS:    CAM-ICU:   ICH:    24 hour Events:     09/07: Right frontal craniotomy & Simpsons grade 1 excision of Right frontal meningioma, Lumbar drain inserted preoperatively  Frontal craniotomy encompassing bilateral frontal sinuses. Frozen: meningioma    Allergies    No Known Allergies    Intolerances    REVIEW OF SYSTEMS: [ ] Unable to Assess due to neurologic exam   [x ] All ROS addressed below are non-contributory, except:  Neuro: [x ] Headache [ ] Back pain [ ] Numbness [ ] Weakness [ ] Ataxia [ ] Dizziness [ ] Aphasia [ ] Dysarthria [ ] Visual disturbance  Resp: [ ] Shortness of breath/dyspnea, [ ] Orthopnea [ ] Cough  CV: [ ] Chest pain [ ] Palpitation [ ] Lightheadedness [ ] Syncope  Renal: [ ] Thirst [ ] Edema  GI: [ ] Nausea [ ] Emesis [ ] Abdominal pain [ ] Constipation [ ] Diarrhea  Hem: [ ] Hematemesis [ ] bright red blood per rectum  ID: [ ] Fever [ ] Chills [ ] Dysuria  ENT: [ ] Rhinorrhea    DEVICES:   [ ] Restraints [ ] ET tube [ ] central line [ ] arterial line [ ] strange [ ] NGT/OGT [ ] EVD [ ] LD [x ] FADY/HMV [ ] Trach [ ] PEG [ ] Chest Tube     VITALS:   Vital Signs Last 24 Hrs  T(C): 36.6 (07 Sep 2023 13:15), Max: 37 (07 Sep 2023 06:43)  T(F): 97.9 (07 Sep 2023 13:15), Max: 98.6 (07 Sep 2023 06:43)  HR: 76 (07 Sep 2023 13:30) (74 - 80)  BP: 132/71 (07 Sep 2023 13:30) (101/64 - 137/68)  BP(mean): 93 (07 Sep 2023 13:30) (93 - 96)  RR: 17 (07 Sep 2023 13:15) (16 - 17)  SpO2: 100% (07 Sep 2023 13:30) (98% - 100%)    Parameters below as of 07 Sep 2023 13:15  Patient On (Oxygen Delivery Method): nasal cannula  O2 Flow (L/min): 2    CAPILLARY BLOOD GLUCOSE      POCT Blood Glucose.: 135 mg/dL (07 Sep 2023 07:02)    I&O's Summary      Respiratory:        LABS:                        11.8   11.57 )-----------( 239      ( 07 Sep 2023 13:45 )             36.3                MEDICATION LEVELS:     IVF FLUIDS/MEDICATIONS:   MEDICATIONS  (STANDING):  dexAMETHasone  Injectable 4 milliGRAM(s) IV Push every 6 hours  levETIRAcetam  IVPB 500 milliGRAM(s) IV Intermittent every 12 hours  nafcillin  IVPB 2 Gram(s) IV Intermittent every 4 hours  pantoprazole  Injectable 40 milliGRAM(s) IV Push daily  senna 2 Tablet(s) Oral at bedtime  sodium chloride 0.9%. 1000 milliLiter(s) (75 mL/Hr) IV Continuous <Continuous>    MEDICATIONS  (PRN):  acetaminophen   IVPB .. 1000 milliGRAM(s) IV Intermittent every 6 hours PRN Severe Pain (7 - 10)  HYDROmorphone  Injectable 0.25 milliGRAM(s) IV Push every 10 minutes PRN Moderate Pain (4 - 6)  HYDROmorphone  Injectable 0.5 milliGRAM(s) IV Push every 10 minutes PRN Severe Pain (7 - 10)  ondansetron Injectable 4 milliGRAM(s) IV Push every 6 hours PRN Nausea and/or Vomiting  ondansetron Injectable 4 milliGRAM(s) IV Push once PRN Nausea and/or Vomiting  oxyCODONE    IR 5 milliGRAM(s) Oral every 4 hours PRN Moderate Pain (4 - 6)        IMAGING:      EXAMINATION:  PHYSICAL EXAM:    Constitutional: No Acute Distress     Neurological: Awake, alert oriented to person, place and time, Following Commands, PERRL, EOMI, No Gaze Preference, Face Symmetrical, Speech Fluent, No dysmetria, No ataxia, No nystagmus     Motor exam:          Upper extremity                         Delt     Bicep     Tricep    HG                                                 R         5/5        5/5        5/5       5/5                                               L          5/5        5/5        5/5       5/5          Lower extremity                        HF         KF        KE       DF         PF                                                  R        5/5 5/5 5/5 5/5 5/5                                               L         5/5 5/5 5/5 5/5 5/5    Pulmonary: Clear to Auscultation, No rales, No rhonchi, No wheezes     Cardiovascular: S1, S2, Regular rate and rhythm     Gastrointestinal: Soft, Non-tender, Non-distended     Extremities: No calf tenderness

## 2023-09-07 NOTE — BRIEF OPERATIVE NOTE - OPERATION/FINDINGS
Right frontal craniotomy & Simpsons grade 1 excision of Right frontal meningioma  Lumbar drain inserted preoperatively  Frontal craniotomy encompassing bilateral frontal sinuses. Sinus packed with gelfoam soaked in betadine  Greyish white tumor attached to the dural margins, soft to firm in consistency and moderately suckable.  Frozen: meningioma

## 2023-09-07 NOTE — PROGRESS NOTE ADULT - ASSESSMENT
ASSESSMENT/PLAN: 59 YO F Right frontal craniotomy & Simpsons grade 1 excision of Right frontal meningioma, Lumbar drain inserted preoperatively  Frontal craniotomy encompassing bilateral frontal sinuses. Frozen: meningioma    NEURO:  Neurocheck q1  CTH in the AM  Lumbar drain in place and clamped  monitor for csf leak  Dexamethasone 4 mg Q6  Keppra 500 BID seizure ppx  MRI with and without when able  Activity: [] OOB as tolerated [x] Bedrest [] PT [] OT [] PMNR    PULM  RA  sat > 94%      CV:  SBP goal:     RENAL:  Fluids: NS at 60 ccs/hour  Monitor BMP nightly   Replenish electrolytes as needed    GI:  Diet: NPO  GI prophylaxis [] not indicated [x] PPI [] other:  Bowel regimen [] colace [x] senna [] other:    ENDO:   Goal euglycemia (-180)    HEME/ONC:  VTE prophylaxis: [x] SCDs [] chemoprophylaxis [] high risk of DVT/PE on admission due to:    ID  Afebrile  WC: WNL    MISC:    SOCIAL/FAMILY:  [] updated at bedside [] family meeting    CODE STATUS:  [x] Full Code [] DNR [] DNI [] Palliative/Comfort Care    DISPOSITION:  [x] ICU [] Stroke Unit [] Floor [] EMU [] RCU [] PCU    [] Patient is at high risk of neurologic deterioration/death due to:     Time seen:  Time spent: ___ [60] critical care minutes

## 2023-09-07 NOTE — PROGRESS NOTE ADULT - ASSESSMENT
ASSESSMENT/PLAN: 59 YO F Right frontal craniotomy & Simpsons grade 1 excision of Right frontal meningioma, Lumbar drain inserted preoperatively  Frontal craniotomy encompassing bilateral frontal sinuses. Frozen: meningioma    NEURO:  Neurocheck q1  CTH in the AM  Lumbar drain in place and clamped  monitor for csf leak  Dexamethasone 4 mg Q6  Keppra 500 BID seizure ppx  MRI with and without when able  Activity: [] OOB as tolerated [x] Bedrest [] PT [] OT [] PMNR    PULM  RA  sat > 94%      CV:  SBP goal:     RENAL:  Fluids: NS at 60 ccs/hour  Monitor BMP nightly   Replenish electrolytes as needed    GI:  Diet: Advance to regular diet  GI prophylaxis [] not indicated [x] PPI [] other:  Bowel regimen [] colace [x] senna [] other:    ENDO:   Goal euglycemia (-180)    HEME/ONC:  VTE prophylaxis: [x] SCDs [] chemoprophylaxis [] high risk of DVT/PE on admission due to:    ID  Afebrile  WC: WNL

## 2023-09-08 LAB — GLUCOSE BLDC GLUCOMTR-MCNC: 107 MG/DL — HIGH (ref 70–99)

## 2023-09-08 PROCEDURE — 93970 EXTREMITY STUDY: CPT | Mod: 26

## 2023-09-08 PROCEDURE — 70553 MRI BRAIN STEM W/O & W/DYE: CPT | Mod: 26

## 2023-09-08 PROCEDURE — 99233 SBSQ HOSP IP/OBS HIGH 50: CPT

## 2023-09-08 PROCEDURE — 70450 CT HEAD/BRAIN W/O DYE: CPT | Mod: 26

## 2023-09-08 RX ORDER — DEXAMETHASONE 0.5 MG/5ML
4 ELIXIR ORAL EVERY 6 HOURS
Refills: 0 | Status: DISCONTINUED | OUTPATIENT
Start: 2023-09-08 | End: 2023-09-10

## 2023-09-08 RX ORDER — PANTOPRAZOLE SODIUM 20 MG/1
40 TABLET, DELAYED RELEASE ORAL
Refills: 0 | Status: DISCONTINUED | OUTPATIENT
Start: 2023-09-08 | End: 2023-09-10

## 2023-09-08 RX ORDER — PANTOPRAZOLE SODIUM 20 MG/1
40 TABLET, DELAYED RELEASE ORAL
Refills: 0 | Status: DISCONTINUED | OUTPATIENT
Start: 2023-09-08 | End: 2023-09-08

## 2023-09-08 RX ORDER — POLYETHYLENE GLYCOL 3350 17 G/17G
17 POWDER, FOR SOLUTION ORAL DAILY
Refills: 0 | Status: DISCONTINUED | OUTPATIENT
Start: 2023-09-08 | End: 2023-09-10

## 2023-09-08 RX ORDER — SODIUM CHLORIDE 9 MG/ML
500 INJECTION INTRAMUSCULAR; INTRAVENOUS; SUBCUTANEOUS ONCE
Refills: 0 | Status: COMPLETED | OUTPATIENT
Start: 2023-09-08 | End: 2023-09-08

## 2023-09-08 RX ORDER — LEVETIRACETAM 250 MG/1
500 TABLET, FILM COATED ORAL
Refills: 0 | Status: DISCONTINUED | OUTPATIENT
Start: 2023-09-08 | End: 2023-09-10

## 2023-09-08 RX ADMIN — Medication 4 MILLIGRAM(S): at 00:51

## 2023-09-08 RX ADMIN — LEVETIRACETAM 500 MILLIGRAM(S): 250 TABLET, FILM COATED ORAL at 18:02

## 2023-09-08 RX ADMIN — Medication 4 MILLIGRAM(S): at 23:37

## 2023-09-08 RX ADMIN — NAFCILLIN 200 GRAM(S): 10 INJECTION, POWDER, FOR SOLUTION INTRAVENOUS at 11:49

## 2023-09-08 RX ADMIN — Medication 4 MILLIGRAM(S): at 13:47

## 2023-09-08 RX ADMIN — NAFCILLIN 200 GRAM(S): 10 INJECTION, POWDER, FOR SOLUTION INTRAVENOUS at 07:51

## 2023-09-08 RX ADMIN — SODIUM CHLORIDE 1000 MILLILITER(S): 9 INJECTION INTRAMUSCULAR; INTRAVENOUS; SUBCUTANEOUS at 11:30

## 2023-09-08 RX ADMIN — NAFCILLIN 200 GRAM(S): 10 INJECTION, POWDER, FOR SOLUTION INTRAVENOUS at 03:38

## 2023-09-08 RX ADMIN — Medication 100 MICROGRAM(S): at 05:54

## 2023-09-08 RX ADMIN — LEVETIRACETAM 400 MILLIGRAM(S): 250 TABLET, FILM COATED ORAL at 05:54

## 2023-09-08 RX ADMIN — Medication 4 MILLIGRAM(S): at 05:54

## 2023-09-08 RX ADMIN — Medication 4 MILLIGRAM(S): at 18:02

## 2023-09-08 NOTE — PROGRESS NOTE ADULT - SUBJECTIVE AND OBJECTIVE BOX
HOSPITAL COURSE: 57 YO F with PMHx of newly diagnosed Right Papillary Thyroid  Cancer, S/P Total Thyroidectomy and Right Neck Lymph Node Dissection at [facility] on 6/12/23,  who presents for a b/l frontal craniotomy for excision of right frontal menignioma. On 8/24 patient had a  syncopal episode, fever, and generalized weakness. During that hospitalization patient had E coli bacteremia. Patient denies any CP/SOB/N/V/D or any urinary/bowel abnormalities     Admission Scores  GCS:   HH:   MF:   NIHSS:   RASS:    CAM-ICU:   ICH:    24 hour Events:     09/07: Right frontal craniotomy & Simpsons grade 1 excision of Right frontal meningioma, Lumbar drain inserted preoperatively  Frontal craniotomy encompassing bilateral frontal sinuses. Frozen: meningioma    Allergies    No Known Allergies    Intolerances    REVIEW OF SYSTEMS: [ ] Unable to Assess due to neurologic exam   [x ] All ROS addressed below are non-contributory, except:  Neuro: [x ] Headache [ ] Back pain [ ] Numbness [ ] Weakness [ ] Ataxia [ ] Dizziness [ ] Aphasia [ ] Dysarthria [ ] Visual disturbance  Resp: [ ] Shortness of breath/dyspnea, [ ] Orthopnea [ ] Cough  CV: [ ] Chest pain [ ] Palpitation [ ] Lightheadedness [ ] Syncope  Renal: [ ] Thirst [ ] Edema  GI: [ ] Nausea [ ] Emesis [ ] Abdominal pain [ ] Constipation [ ] Diarrhea  Hem: [ ] Hematemesis [ ] bright red blood per rectum  ID: [ ] Fever [ ] Chills [ ] Dysuria  ENT: [ ] Rhinorrhea    DEVICES:   [ ] Restraints [ ] ET tube [ ] central line [ ] arterial line [ ] strange [ ] NGT/OGT [ ] EVD [ ] LD [x ] FADY/HMV [ ] Trach [ ] PEG [ ] Chest Tube     VITALS:   Vital Signs Last 24 Hrs  T(C): 36.6 (07 Sep 2023 13:15), Max: 37 (07 Sep 2023 06:43)  T(F): 97.9 (07 Sep 2023 13:15), Max: 98.6 (07 Sep 2023 06:43)  HR: 76 (07 Sep 2023 13:30) (74 - 80)  BP: 132/71 (07 Sep 2023 13:30) (101/64 - 137/68)  BP(mean): 93 (07 Sep 2023 13:30) (93 - 96)  RR: 17 (07 Sep 2023 13:15) (16 - 17)  SpO2: 100% (07 Sep 2023 13:30) (98% - 100%)    Parameters below as of 07 Sep 2023 13:15  Patient On (Oxygen Delivery Method): nasal cannula  O2 Flow (L/min): 2    CAPILLARY BLOOD GLUCOSE      POCT Blood Glucose.: 135 mg/dL (07 Sep 2023 07:02)    I&O's Summary      Respiratory:        LABS:                        11.8   11.57 )-----------( 239      ( 07 Sep 2023 13:45 )             36.3                MEDICATION LEVELS:     IVF FLUIDS/MEDICATIONS:   MEDICATIONS  (STANDING):  dexAMETHasone  Injectable 4 milliGRAM(s) IV Push every 6 hours  levETIRAcetam  IVPB 500 milliGRAM(s) IV Intermittent every 12 hours  nafcillin  IVPB 2 Gram(s) IV Intermittent every 4 hours  pantoprazole  Injectable 40 milliGRAM(s) IV Push daily  senna 2 Tablet(s) Oral at bedtime  sodium chloride 0.9%. 1000 milliLiter(s) (75 mL/Hr) IV Continuous <Continuous>    MEDICATIONS  (PRN):  acetaminophen   IVPB .. 1000 milliGRAM(s) IV Intermittent every 6 hours PRN Severe Pain (7 - 10)  HYDROmorphone  Injectable 0.25 milliGRAM(s) IV Push every 10 minutes PRN Moderate Pain (4 - 6)  HYDROmorphone  Injectable 0.5 milliGRAM(s) IV Push every 10 minutes PRN Severe Pain (7 - 10)  ondansetron Injectable 4 milliGRAM(s) IV Push every 6 hours PRN Nausea and/or Vomiting  ondansetron Injectable 4 milliGRAM(s) IV Push once PRN Nausea and/or Vomiting  oxyCODONE    IR 5 milliGRAM(s) Oral every 4 hours PRN Moderate Pain (4 - 6)        IMAGING:      EXAMINATION:  PHYSICAL EXAM:    Constitutional: No Acute Distress     Neurological: Awake, alert oriented to person, place and time, Following Commands, PERRL, EOMI, No Gaze Preference, Face Symmetrical, Speech Fluent, No dysmetria, No ataxia, No nystagmus     Motor exam:          Upper extremity                         Delt     Bicep     Tricep    HG                                                 R         5/5        5/5        5/5       5/5                                               L          5/5        5/5        5/5       5/5          Lower extremity                        HF         KF        KE       DF         PF                                                  R        5/5 5/5 5/5 5/5 5/5                                               L         5/5 5/5 5/5 5/5 5/5    Pulmonary: Clear to Auscultation, No rales, No rhonchi, No wheezes     Cardiovascular: S1, S2, Regular rate and rhythm     Gastrointestinal: Soft, Non-tender, Non-distended     Extremities: No calf tenderness        HOSPITAL COURSE: 59 YO F with PMHx of newly diagnosed Right Papillary Thyroid  Cancer, S/P Total Thyroidectomy and Right Neck Lymph Node Dissection at [facility] on 6/12/23,  who presents for a b/l frontal craniotomy for excision of right frontal menignioma. On 8/24 patient had a  syncopal episode, fever, and generalized weakness. During that hospitalization patient had E coli bacteremia. Patient denies any CP/SOB/N/V/D or any urinary/bowel abnormalities     Admission Scores  GCS:   HH:   MF:   NIHSS:   RASS:    CAM-ICU:   ICH:    24 hour Events:     09/07: Right frontal craniotomy & Simpsons grade 1 excision of Right frontal meningioma, Lumbar drain inserted preoperatively  Frontal craniotomy encompassing bilateral frontal sinuses. Frozen: meningioma  09/08: No overnight events    Allergies    No Known Allergies    Intolerances    REVIEW OF SYSTEMS: [ ] Unable to Assess due to neurologic exam   [x ] All ROS addressed below are non-contributory, except:  Neuro: [x ] Headache [ ] Back pain [ ] Numbness [ ] Weakness [ ] Ataxia [ ] Dizziness [ ] Aphasia [ ] Dysarthria [ ] Visual disturbance  Resp: [ ] Shortness of breath/dyspnea, [ ] Orthopnea [ ] Cough  CV: [ ] Chest pain [ ] Palpitation [ ] Lightheadedness [ ] Syncope  Renal: [ ] Thirst [ ] Edema  GI: [ ] Nausea [ ] Emesis [ ] Abdominal pain [ ] Constipation [ ] Diarrhea  Hem: [ ] Hematemesis [ ] bright red blood per rectum  ID: [ ] Fever [ ] Chills [ ] Dysuria  ENT: [ ] Rhinorrhea    DEVICES:   [ ] Restraints [ ] ET tube [ ] central line [ ] arterial line [ ] strange [ ] NGT/OGT [ ] EVD [ ] LD [x ] FADY/HMV [ ] Trach [ ] PEG [ ] Chest Tube     ICU Vital Signs Last 24 Hrs  T(C): 37.1 (08 Sep 2023 03:00), Max: 37.1 (08 Sep 2023 03:00)  T(F): 98.8 (08 Sep 2023 03:00), Max: 98.8 (08 Sep 2023 03:00)  HR: 56 (08 Sep 2023 10:30) (47 - 80)  BP: 135/65 (07 Sep 2023 17:00) (114/58 - 137/68)  BP(mean): 92 (07 Sep 2023 17:00) (83 - 96)  ABP: 96/42 (08 Sep 2023 10:30) (96/42 - 162/75)  ABP(mean): 60 (08 Sep 2023 10:30) (60 - 126)  RR: 13 (08 Sep 2023 10:30) (12 - 18)  SpO2: 98% (08 Sep 2023 10:30) (96% - 100%)    O2 Parameters below as of 07 Sep 2023 19:00  Patient On (Oxygen Delivery Method): room air                          11.8   11.57 )-----------( 239      ( 07 Sep 2023 13:45 )             36.3   09-07    137  |  103  |  11  ----------------------------<  143<H>  3.5   |  21<L>  |  0.53    Ca    7.6<L>      07 Sep 2023 14:04      MEDICATION LEVELS:     IVF FLUIDS/MEDICATIONS:   MEDICATIONS  (STANDING):  dexAMETHasone  Injectable 4 milliGRAM(s) IV Push every 6 hours  levETIRAcetam  IVPB 500 milliGRAM(s) IV Intermittent every 12 hours  nafcillin  IVPB 2 Gram(s) IV Intermittent every 4 hours  pantoprazole  Injectable 40 milliGRAM(s) IV Push daily  senna 2 Tablet(s) Oral at bedtime  sodium chloride 0.9%. 1000 milliLiter(s) (75 mL/Hr) IV Continuous <Continuous>    MEDICATIONS  (PRN):  acetaminophen   IVPB .. 1000 milliGRAM(s) IV Intermittent every 6 hours PRN Severe Pain (7 - 10)  HYDROmorphone  Injectable 0.25 milliGRAM(s) IV Push every 10 minutes PRN Moderate Pain (4 - 6)  HYDROmorphone  Injectable 0.5 milliGRAM(s) IV Push every 10 minutes PRN Severe Pain (7 - 10)  ondansetron Injectable 4 milliGRAM(s) IV Push every 6 hours PRN Nausea and/or Vomiting  ondansetron Injectable 4 milliGRAM(s) IV Push once PRN Nausea and/or Vomiting  oxyCODONE    IR 5 milliGRAM(s) Oral every 4 hours PRN Moderate Pain (4 - 6)        IMAGING:      EXAMINATION:  PHYSICAL EXAM:    Constitutional: No Acute Distress     Neurological: Awake, alert oriented to person, place and time, Following Commands, PERRL, EOMI, No Gaze Preference, Face Symmetrical, Speech Fluent, No dysmetria, No ataxia, No nystagmus     Motor exam:          Upper extremity                         Delt     Bicep     Tricep    HG                                                 R         5/5 5/5 5/5 5/5                                               L          5/5 5/5 5/5 5/5          Lower extremity                        HF         KF        KE       DF         PF                                                  R        5/5 5/5 5/5 5/5 5/5                                               L         5/5 5/5 5/5 5/5 5/5    Pulmonary: Clear to Auscultation, No rales, No rhonchi, No wheezes     Cardiovascular: S1, S2, Regular rate and rhythm     Gastrointestinal: Soft, Non-tender, Non-distended     Extremities: No calf tenderness

## 2023-09-08 NOTE — PHYSICAL THERAPY INITIAL EVALUATION ADULT - PERTINENT HX OF CURRENT PROBLEM, REHAB EVAL
59 YO F with PMHx of newly diagnosed Right Papillary Thyroid  Cancer, S/P Total Thyroidectomy and Right Neck Lymph Node Dissection at [facility] on 6/12/23,  who presents for a b/l frontal craniotomy for excision of right frontal menignioma. On 8/24 patient had a  syncopal episode, fever, and generalized weakness. During that hospitalization patient had E coli bacteremia. Patient denies any CP/SOB/N/V/D or any urinary/bowel abnormalities

## 2023-09-08 NOTE — PROGRESS NOTE ADULT - ASSESSMENT
ASSESSMENT/PLAN: 57 YO F Right frontal craniotomy & Simpsons grade 1 excision of Right frontal meningioma, Lumbar drain inserted preoperatively  Frontal craniotomy encompassing bilateral frontal sinuses. Frozen: meningioma    NEURO:  Neurocheck q1  CTH in the AM  Lumbar drain in place and clamped  monitor for csf leak  Dexamethasone 4 mg Q6  Keppra 500 BID seizure ppx  MRI with and without when able  Activity: [] OOB as tolerated [x] Bedrest [] PT [] OT [] PMNR    PULM  RA  sat > 94%      CV:  SBP goal:     RENAL:  Fluids: NS at 60 ccs/hour  Monitor BMP nightly   Replenish electrolytes as needed    GI:  Diet: NPO  GI prophylaxis [] not indicated [x] PPI [] other:  Bowel regimen [] colace [x] senna [] other:    ENDO:   Goal euglycemia (-180)    HEME/ONC:  VTE prophylaxis: [x] SCDs [] chemoprophylaxis [] high risk of DVT/PE on admission due to:    ID  Afebrile  WC: WNL    MISC:    SOCIAL/FAMILY:  [] updated at bedside [] family meeting    CODE STATUS:  [x] Full Code [] DNR [] DNI [] Palliative/Comfort Care    DISPOSITION:  [x] ICU [] Stroke Unit [] Floor [] EMU [] RCU [] PCU    [] Patient is at high risk of neurologic deterioration/death due to:     Time seen:  Time spent: ___ [60] critical care minutes ASSESSMENT/PLAN: 59 YO F Right frontal craniotomy & Simpsons grade 1 excision of Right frontal meningioma, Lumbar drain inserted preoperatively  Frontal craniotomy encompassing bilateral frontal sinuses. Frozen: meningioma    NEURO:  Neurocheck q4  Repeat CTH: stable  Lumbar drain in place and clamped  monitor for csf leak  Dexamethasone 4 mg Q6  Keppra 500 BID seizure ppx  MRI with and without when able  Activity: [] OOB as tolerated [] Bedrest [x] PT [x] OT [] PMNR    PULM  RA  sat > 94%      CV:  SBP goal:     RENAL:  Fluids: IVL  Monitor BMP nightly   Replenish electrolytes as needed    GI:  Diet: Regular Diet  GI prophylaxis [] not indicated [x] PPI [] other:  Bowel regimen [x] colace [x] senna [] other:    ENDO:   Goal euglycemia (-180)    HEME/ONC:  VTE prophylaxis: [x] SCDs [] chemoprophylaxis [] high risk of DVT/PE on admission due to:  will f/u with neurosgx for dvt ppx    ID  Afebrile  WC: WNL    MISC:    SOCIAL/FAMILY:  [] updated at bedside [] family meeting    CODE STATUS:  [x] Full Code [] DNR [] DNI [] Palliative/Comfort Care    DISPOSITION:  [x] ICU [] Stroke Unit [] Floor [] EMU [] RCU [] PCU    [] Patient is at high risk of neurologic deterioration/death due to:     Time seen:  Time spent: ___ [60] critical care minutes

## 2023-09-08 NOTE — PHYSICAL THERAPY INITIAL EVALUATION ADULT - ADDITIONAL COMMENTS
as per pt: PTA pt was living in a PH + Stairs and was independent in all functional mobility and ADL's. no AD for gait. has 5 steps to enter + hand rail.

## 2023-09-08 NOTE — PROGRESS NOTE ADULT - SUBJECTIVE AND OBJECTIVE BOX
Patient seen and examined at bedside.    --Anticoagulation--    T(C): 36.6 (09-07-23 @ 23:00), Max: 37 (09-07-23 @ 06:43)  HR: 53 (09-08-23 @ 01:00) (47 - 80)  BP: 135/65 (09-07-23 @ 17:00) (101/64 - 137/68)  RR: 12 (09-08-23 @ 01:00) (12 - 18)  SpO2: 98% (09-08-23 @ 01:00) (96% - 100%)  Wt(kg): --    Exam: AOx3, PERRL, EOMI, no facial, no drift, MURGUIA 5/5, SILT

## 2023-09-08 NOTE — PROGRESS NOTE ADULT - NS ATTEST RISK PROBLEM GEN_ALL_CORE FT
59 YO F Right frontal craniotomy & Simpsons grade 1 excision of Right frontal meningioma, Lumbar drain inserted preoperatively  Frontal craniotomy encompassing bilateral frontal sinuses. Frozen: meningioma POD 2   CT head with excpected post-op changes, neuro checks q 2 hr until LD is removed, decadron for vasogenic edema, follow official path results, LD clamped, RA, regular diet, PPI while on decadron , IVL, strat chemorophylaxis if ok with NS
59 YO F Right frontal craniotomy & Simpsons grade 1 excision of Right frontal meningioma, Lumbar drain inserted preoperatively  Frontal craniotomy encompassing bilateral frontal sinuses. Frozen: meningioma POD 2   CT head with excpected post-op changes, neuro checks q 2 hr until LD is removed, decadron for vasogenic edema, follow official path results, LD clamped, RA, regular diet, PPI while on decadron , IVL, strat chemorophylaxis if ok with NS

## 2023-09-09 LAB
ANION GAP SERPL CALC-SCNC: 8 MMOL/L — SIGNIFICANT CHANGE UP (ref 5–17)
BUN SERPL-MCNC: 12 MG/DL — SIGNIFICANT CHANGE UP (ref 7–23)
CALCIUM SERPL-MCNC: 8.2 MG/DL — LOW (ref 8.4–10.5)
CHLORIDE SERPL-SCNC: 106 MMOL/L — SIGNIFICANT CHANGE UP (ref 96–108)
CO2 SERPL-SCNC: 25 MMOL/L — SIGNIFICANT CHANGE UP (ref 22–31)
CREAT SERPL-MCNC: 0.49 MG/DL — LOW (ref 0.5–1.3)
EGFR: 109 ML/MIN/1.73M2 — SIGNIFICANT CHANGE UP
GLUCOSE SERPL-MCNC: 109 MG/DL — HIGH (ref 70–99)
HCT VFR BLD CALC: 34.9 % — SIGNIFICANT CHANGE UP (ref 34.5–45)
HGB BLD-MCNC: 11.3 G/DL — LOW (ref 11.5–15.5)
MAGNESIUM SERPL-MCNC: 2.1 MG/DL — SIGNIFICANT CHANGE UP (ref 1.6–2.6)
MCHC RBC-ENTMCNC: 27.2 PG — SIGNIFICANT CHANGE UP (ref 27–34)
MCHC RBC-ENTMCNC: 32.4 GM/DL — SIGNIFICANT CHANGE UP (ref 32–36)
MCV RBC AUTO: 84.1 FL — SIGNIFICANT CHANGE UP (ref 80–100)
NRBC # BLD: 0 /100 WBCS — SIGNIFICANT CHANGE UP (ref 0–0)
PHOSPHATE SERPL-MCNC: 2.7 MG/DL — SIGNIFICANT CHANGE UP (ref 2.5–4.5)
PLATELET # BLD AUTO: 210 K/UL — SIGNIFICANT CHANGE UP (ref 150–400)
POTASSIUM SERPL-MCNC: 3.8 MMOL/L — SIGNIFICANT CHANGE UP (ref 3.5–5.3)
POTASSIUM SERPL-SCNC: 3.8 MMOL/L — SIGNIFICANT CHANGE UP (ref 3.5–5.3)
RBC # BLD: 4.15 M/UL — SIGNIFICANT CHANGE UP (ref 3.8–5.2)
RBC # FLD: 13.6 % — SIGNIFICANT CHANGE UP (ref 10.3–14.5)
SODIUM SERPL-SCNC: 139 MMOL/L — SIGNIFICANT CHANGE UP (ref 135–145)
WBC # BLD: 7.94 K/UL — SIGNIFICANT CHANGE UP (ref 3.8–10.5)
WBC # FLD AUTO: 7.94 K/UL — SIGNIFICANT CHANGE UP (ref 3.8–10.5)

## 2023-09-09 RX ORDER — ENOXAPARIN SODIUM 100 MG/ML
40 INJECTION SUBCUTANEOUS
Refills: 0 | Status: DISCONTINUED | OUTPATIENT
Start: 2023-09-09 | End: 2023-09-10

## 2023-09-09 RX ADMIN — Medication 100 MICROGRAM(S): at 05:14

## 2023-09-09 RX ADMIN — Medication 4 MILLIGRAM(S): at 12:07

## 2023-09-09 RX ADMIN — PANTOPRAZOLE SODIUM 40 MILLIGRAM(S): 20 TABLET, DELAYED RELEASE ORAL at 06:05

## 2023-09-09 RX ADMIN — LEVETIRACETAM 500 MILLIGRAM(S): 250 TABLET, FILM COATED ORAL at 16:50

## 2023-09-09 RX ADMIN — Medication 4 MILLIGRAM(S): at 06:36

## 2023-09-09 RX ADMIN — Medication 4 MILLIGRAM(S): at 23:20

## 2023-09-09 RX ADMIN — LEVETIRACETAM 500 MILLIGRAM(S): 250 TABLET, FILM COATED ORAL at 06:36

## 2023-09-09 RX ADMIN — Medication 4 MILLIGRAM(S): at 16:50

## 2023-09-09 NOTE — PROGRESS NOTE ADULT - SUBJECTIVE AND OBJECTIVE BOX
CC: Patient reports some facial swelling and incisional pain    OVERNIGHT; [facility], LD removed yesterday    Vital Signs Last 24 Hrs  T(C): 36.8 (09 Sep 2023 16:00), Max: 37.3 (08 Sep 2023 19:24)  T(F): 98.3 (09 Sep 2023 16:00), Max: 99.2 (08 Sep 2023 19:24)  HR: 71 (09 Sep 2023 16:00) (57 - 78)  BP: 99/59 (09 Sep 2023 13:50) (93/59 - 105/66)  BP(mean): --  RR: 16 (09 Sep 2023 16:00) (15 - 18)  SpO2: 98% (09 Sep 2023 16:00) (96% - 99%)    Parameters below as of 09 Sep 2023 16:00  Patient On (Oxygen Delivery Method): room air    PHYSICAL EXAM:    Constitutional: No Acute Distress     Neurological: Awake, alert oriented to person, place and time, Following Commands, PERRL, EOMI, No Gaze Preference, Face Symmetrical, Speech Fluent, No dysmetria, No ataxia, No nystagmus; MURGUIA x 4 with full strength and sensation.   mild right periorbital edema    Pulmonary: Clear to Auscultation, No rales, No rhonchi, No wheezes     Cardiovascular: S1, S2, Regular rate and rhythm     Gastrointestinal: Soft, Non-tender, Non-distended     Extremities: No calf tenderness     INCISION: + staples c/d/i, head wrap removed     LABS:                         11.3   7.94  )-----------( 210      ( 09 Sep 2023 06:50 )             34.9   09-09    139  |  106  |  12  ----------------------------<  109<H>  3.8   |  25  |  0.49<L>    Ca    8.2<L>      09 Sep 2023 06:48  Phos  2.7     09-09  Mg     2.1     09-09      MEDICATIONS  (STANDING):  bisacodyl 5 milliGRAM(s) Oral every 12 hours  dexAMETHasone     Tablet 4 milliGRAM(s) Oral every 6 hours  enoxaparin Injectable 40 milliGRAM(s) SubCutaneous <User Schedule>  levETIRAcetam 500 milliGRAM(s) Oral two times a day  levothyroxine 100 MICROGram(s) Oral daily  pantoprazole    Tablet 40 milliGRAM(s) Oral before breakfast  polyethylene glycol 3350 17 Gram(s) Oral daily  senna 2 Tablet(s) Oral at bedtime    MEDICATIONS  (PRN):  ondansetron Injectable 4 milliGRAM(s) IV Push every 6 hours PRN Nausea and/or Vomiting  oxyCODONE    IR 5 milliGRAM(s) Oral every 4 hours PRN Moderate Pain (4 - 6)    IMAGING:    < from: VA Duplex Lower Ext Vein Scan, Bilat (09.08.23 @ 15:13) >  IMPRESSION:  No evidence of deep venous thrombosis in either lower extremity.      < end of copied text >      < from: MR Head w/wo IV Cont (09.08.23 @ 17:37) >  INTERPRETATION:  Indication: Status post surgery.    MRI of the brain was performed sagittal T1 axial T1 and T2 T2 FLAIR   diffusion and susceptibility weighted sequence. Coronal T2-weighted   sequence was performed as well. The patient was injected with   approximately 6 cc gadavist IV 1.5 cc of contrast discarded. Sagittal   coronal and axial T1-weighted sequences performed.    This exam is compared with prior contrast enhanced brain MRI performed on   August 25, 2023 and head CT performed on September 8, 2022.    Postoperative changes compatible with bifrontal craniotomies are again   identified. There has been resection of the previously noted extra-axial   enhancing lesion involving the inferior medial aspect of the left frontal   region. There is evidence of T1 shortening identified in the   postoperative region which could be compatible areas of hemorrhage and/or   postop material.    Extra-axial air and hemorrhage involving the bifrontal region is   identified. This finding measures approximately 1.3 cm in widest diameter.    Residual area of edema involving the right frontal region is again seen.   Small subduralhematomas involving the bifrontal region are also   identified which is related to postop changes. There is evidence of   smooth dural enhancement seen involving bilateral temporal frontal region   which is likely related to postop changes.    The visualized paranasal sinuses demonstrate minimal mucosal thickening   of both ethmoid sinuses.    Both mastoid and middle voids appear clear.    IMPRESSION: Postoperative changes as described above.    < end of copied text >

## 2023-09-09 NOTE — PROGRESS NOTE ADULT - REASON FOR ADMISSION
Right frontal craniotomy
Right frontal craniotomy
postop obvs
Right frontal craniotomy
9/7/23 s/p bifrontal craniotomy for resection of brain mass, final pathology pending.

## 2023-09-09 NOTE — PROGRESS NOTE ADULT - ASSESSMENT
HPI:  58 year old right handed female with PMH of newly diagnosed Right Papillary Thyroid  Cancer, S/P Total Thyroidectomy and Right Neck Lymph Node Dissection at [facility] on 6/12/23,  who presents with Brain Tumor. Patient endorses Brain lesion was incidentally found after she presented to [facility] on 8/24 with syncopal episode, fever, and generalized weakness.  Patient states that while hospitalized she underwent imaging of the Brain which revealed large vasogenic edema in the anterior and inferior Right frontal lobe concerning for metastatic disease. Her hospital course was also complicated by E coli bacteremia (patient  is currently on Cephalexin). She denies sob, chest pain, palpitations,  fever, chills, nausea/vomiting, headaches, seizure like activity, visual, motor or sensory changes. She presents to [facility] today for evaluation prior to scheduled Right Frontal Craniotomy on 9/7/2023.  (05 Sep 2023 07:08)    ASSESSMENT/PLAN:     9/7/23 s/p bifrontal craniotomy for resection of brain mass, final pathology pending.  9/8 LD removed, transferred to floor  9/9 dressing removed, no CSF leak complaints    NEURO:  Neurocheck q4  pain meds PRN  no evidence of CSF leak  Dexamethasone 4 mg Q6 and taper for expected cerebral edema  Keppra 500 BID seizure prophylaxis  Activity: [] OOB as tolerated [] Bedrest [x] PT [x] OT [] PMR- no skilled PT/OT needs    PULM  RA  sat > 94%    CV:  SBP goal:     RENAL:  Fluids: IVL, voiding  monitor BMP, f/u am labs    GI:  Diet: Regular Diet  GI prophylaxis [] not indicated [x] PPI [] other:  Bowel regimen [x] colace [x] senna [] other:    ENDO:   Goal euglycemia (-180)    HEME/ONC:  VTE prophylaxis: [x] SCDs [x] chemoprophylaxis [] high risk of DVT/PE on admission due to:  start SQL tonight    ID  Afebrile    f/u final pathology    updated at bedside     discussed with Dr Crowley  [id number]

## 2023-09-10 ENCOUNTER — TRANSCRIPTION ENCOUNTER (OUTPATIENT)
Age: 58
End: 2023-09-10

## 2023-09-10 VITALS
HEART RATE: 67 BPM | SYSTOLIC BLOOD PRESSURE: 100 MMHG | TEMPERATURE: 99 F | OXYGEN SATURATION: 94 % | DIASTOLIC BLOOD PRESSURE: 64 MMHG | RESPIRATION RATE: 18 BRPM

## 2023-09-10 LAB
ANION GAP SERPL CALC-SCNC: 11 MMOL/L — SIGNIFICANT CHANGE UP (ref 5–17)
BASOPHILS # BLD AUTO: 0 K/UL — SIGNIFICANT CHANGE UP (ref 0–0.2)
BASOPHILS NFR BLD AUTO: 0 % — SIGNIFICANT CHANGE UP (ref 0–2)
BUN SERPL-MCNC: 13 MG/DL — SIGNIFICANT CHANGE UP (ref 7–23)
CALCIUM SERPL-MCNC: 8 MG/DL — LOW (ref 8.4–10.5)
CHLORIDE SERPL-SCNC: 106 MMOL/L — SIGNIFICANT CHANGE UP (ref 96–108)
CO2 SERPL-SCNC: 23 MMOL/L — SIGNIFICANT CHANGE UP (ref 22–31)
CREAT SERPL-MCNC: 0.41 MG/DL — LOW (ref 0.5–1.3)
EGFR: 114 ML/MIN/1.73M2 — SIGNIFICANT CHANGE UP
EOSINOPHIL # BLD AUTO: 0.01 K/UL — SIGNIFICANT CHANGE UP (ref 0–0.5)
EOSINOPHIL NFR BLD AUTO: 0.2 % — SIGNIFICANT CHANGE UP (ref 0–6)
GLUCOSE SERPL-MCNC: 111 MG/DL — HIGH (ref 70–99)
HCT VFR BLD CALC: 34.5 % — SIGNIFICANT CHANGE UP (ref 34.5–45)
HGB BLD-MCNC: 10.9 G/DL — LOW (ref 11.5–15.5)
IMM GRANULOCYTES NFR BLD AUTO: 0.5 % — SIGNIFICANT CHANGE UP (ref 0–0.9)
LYMPHOCYTES # BLD AUTO: 1.22 K/UL — SIGNIFICANT CHANGE UP (ref 1–3.3)
LYMPHOCYTES # BLD AUTO: 18.8 % — SIGNIFICANT CHANGE UP (ref 13–44)
MCHC RBC-ENTMCNC: 27.7 PG — SIGNIFICANT CHANGE UP (ref 27–34)
MCHC RBC-ENTMCNC: 31.6 GM/DL — LOW (ref 32–36)
MCV RBC AUTO: 87.6 FL — SIGNIFICANT CHANGE UP (ref 80–100)
MONOCYTES # BLD AUTO: 0.37 K/UL — SIGNIFICANT CHANGE UP (ref 0–0.9)
MONOCYTES NFR BLD AUTO: 5.7 % — SIGNIFICANT CHANGE UP (ref 2–14)
NEUTROPHILS # BLD AUTO: 4.85 K/UL — SIGNIFICANT CHANGE UP (ref 1.8–7.4)
NEUTROPHILS NFR BLD AUTO: 74.8 % — SIGNIFICANT CHANGE UP (ref 43–77)
NRBC # BLD: 0 /100 WBCS — SIGNIFICANT CHANGE UP (ref 0–0)
PLATELET # BLD AUTO: 182 K/UL — SIGNIFICANT CHANGE UP (ref 150–400)
POTASSIUM SERPL-MCNC: 3.7 MMOL/L — SIGNIFICANT CHANGE UP (ref 3.5–5.3)
POTASSIUM SERPL-SCNC: 3.7 MMOL/L — SIGNIFICANT CHANGE UP (ref 3.5–5.3)
RBC # BLD: 3.94 M/UL — SIGNIFICANT CHANGE UP (ref 3.8–5.2)
RBC # FLD: 13.7 % — SIGNIFICANT CHANGE UP (ref 10.3–14.5)
SODIUM SERPL-SCNC: 140 MMOL/L — SIGNIFICANT CHANGE UP (ref 135–145)
WBC # BLD: 6.48 K/UL — SIGNIFICANT CHANGE UP (ref 3.8–10.5)
WBC # FLD AUTO: 6.48 K/UL — SIGNIFICANT CHANGE UP (ref 3.8–10.5)

## 2023-09-10 PROCEDURE — C1729: CPT

## 2023-09-10 PROCEDURE — 85025 COMPLETE CBC W/AUTO DIFF WBC: CPT

## 2023-09-10 PROCEDURE — C1889: CPT

## 2023-09-10 PROCEDURE — 97165 OT EVAL LOW COMPLEX 30 MIN: CPT

## 2023-09-10 PROCEDURE — 84100 ASSAY OF PHOSPHORUS: CPT

## 2023-09-10 PROCEDURE — 70553 MRI BRAIN STEM W/O & W/DYE: CPT

## 2023-09-10 PROCEDURE — 97162 PT EVAL MOD COMPLEX 30 MIN: CPT

## 2023-09-10 PROCEDURE — 83735 ASSAY OF MAGNESIUM: CPT

## 2023-09-10 PROCEDURE — A9585: CPT

## 2023-09-10 PROCEDURE — 88307 TISSUE EXAM BY PATHOLOGIST: CPT

## 2023-09-10 PROCEDURE — 82962 GLUCOSE BLOOD TEST: CPT

## 2023-09-10 PROCEDURE — 36415 COLL VENOUS BLD VENIPUNCTURE: CPT

## 2023-09-10 PROCEDURE — 88331 PATH CONSLTJ SURG 1 BLK 1SPC: CPT

## 2023-09-10 PROCEDURE — 88341 IMHCHEM/IMCYTCHM EA ADD ANTB: CPT

## 2023-09-10 PROCEDURE — 80048 BASIC METABOLIC PNL TOTAL CA: CPT

## 2023-09-10 PROCEDURE — 88342 IMHCHEM/IMCYTCHM 1ST ANTB: CPT

## 2023-09-10 PROCEDURE — 93970 EXTREMITY STUDY: CPT

## 2023-09-10 PROCEDURE — 88333 PATH CONSLTJ SURG CYTO XM 1: CPT

## 2023-09-10 PROCEDURE — C9399: CPT

## 2023-09-10 PROCEDURE — 70450 CT HEAD/BRAIN W/O DYE: CPT

## 2023-09-10 PROCEDURE — 85027 COMPLETE CBC AUTOMATED: CPT

## 2023-09-10 PROCEDURE — C1713: CPT

## 2023-09-10 RX ORDER — DEXAMETHASONE 0.5 MG/5ML
2 ELIXIR ORAL EVERY 12 HOURS
Refills: 0 | Status: CANCELLED | OUTPATIENT
Start: 2023-09-14 | End: 2023-09-10

## 2023-09-10 RX ORDER — DEXAMETHASONE 0.5 MG/5ML
1 ELIXIR ORAL
Refills: 0 | DISCHARGE

## 2023-09-10 RX ORDER — DEXAMETHASONE 0.5 MG/5ML
2 ELIXIR ORAL EVERY 8 HOURS
Refills: 0 | Status: CANCELLED | OUTPATIENT
Start: 2023-09-12 | End: 2023-09-10

## 2023-09-10 RX ORDER — OXYCODONE HYDROCHLORIDE 5 MG/1
1 TABLET ORAL
Qty: 28 | Refills: 0
Start: 2023-09-10 | End: 2023-09-16

## 2023-09-10 RX ORDER — DEXAMETHASONE 0.5 MG/5ML
ELIXIR ORAL
Refills: 0 | Status: DISCONTINUED | OUTPATIENT
Start: 2023-09-10 | End: 2023-09-10

## 2023-09-10 RX ORDER — DEXAMETHASONE 0.5 MG/5ML
2 ELIXIR ORAL EVERY 6 HOURS
Refills: 0 | Status: DISCONTINUED | OUTPATIENT
Start: 2023-09-10 | End: 2023-09-10

## 2023-09-10 RX ORDER — DEXAMETHASONE 0.5 MG/5ML
1 ELIXIR ORAL
Qty: 18 | Refills: 0
Start: 2023-09-10

## 2023-09-10 RX ORDER — LEVETIRACETAM 250 MG/1
1 TABLET, FILM COATED ORAL
Qty: 60 | Refills: 0
Start: 2023-09-10 | End: 2023-10-09

## 2023-09-10 RX ADMIN — Medication 4 MILLIGRAM(S): at 05:46

## 2023-09-10 RX ADMIN — PANTOPRAZOLE SODIUM 40 MILLIGRAM(S): 20 TABLET, DELAYED RELEASE ORAL at 05:46

## 2023-09-10 RX ADMIN — LEVETIRACETAM 500 MILLIGRAM(S): 250 TABLET, FILM COATED ORAL at 05:46

## 2023-09-10 RX ADMIN — Medication 5 MILLIGRAM(S): at 05:46

## 2023-09-10 RX ADMIN — Medication 100 MICROGRAM(S): at 05:46

## 2023-09-10 NOTE — DISCHARGE NOTE PROVIDER - NSDCMRMEDTOKEN_GEN_ALL_CORE_FT
dexAMETHasone 2 mg oral tablet: 1 tab(s) orally once a day sy: 1 tab every 6 hours x 2 days than 1 tab every 8 hours x 2 days than 1 tab every 12 hours x 2 days MDD: 4  levETIRAcetam 500 mg oral tablet: 1 tab(s) orally 2 times a day  levothyroxine 100 mcg (0.1 mg) oral tablet: 1 tab(s) orally once a day  oxyCODONE 5 mg oral tablet: 1 tab(s) orally every 6 hours as needed for  headache MDD: 6  Protonix 40 mg oral delayed release tablet: 1 tab(s) orally once a day before breakfast

## 2023-09-10 NOTE — DISCHARGE NOTE PROVIDER - NSDCCPCAREPLAN_GEN_ALL_CORE_FT
PRINCIPAL DISCHARGE DIAGNOSIS  Diagnosis: Brain mass  Assessment and Plan of Treatment: s/p craniotomy on 9/7

## 2023-09-10 NOTE — PROGRESS NOTE ADULT - ASSESSMENT
HPI:  58 year old right handed female with PMH of newly diagnosed Right Papillary Thyroid  Cancer, S/P Total Thyroidectomy and Right Neck Lymph Node Dissection at [facility] on 6/12/23,  who presents with Brain Tumor. Patient endorses Brain lesion was incidentally found after she presented to [facility] on 8/24 with syncopal episode, fever, and generalized weakness.  Patient states that while hospitalized she underwent imaging of the Brain which revealed large vasogenic edema in the anterior and inferior Right frontal lobe concerning for metastatic disease. Her hospital course was also complicated by E coli bacteremia (patient  is currently on Cephalexin). She denies sob, chest pain, palpitations,  fever, chills, nausea/vomiting, headaches, seizure like activity, visual, motor or sensory changes. She presents to [facility] today for evaluation prior to scheduled Right Frontal Craniotomy on 9/7/2023.  (05 Sep 2023 07:08)    ASSESSMENT/PLAN:     9/7/23 s/p bifrontal craniotomy for resection of brain mass, final pathology pending.  9/8 LD removed, transferred to floor  9/9 dressing removed, no CSF leak complaints    NEURO:  Neurocheck q4  pain meds PRN  no evidence of CSF leak  Dexamethasone taper x 1 week per Dr. Crowley   Keppra 500 BID seizure prophylaxis  Activity: [] OOB as tolerated [] Bedrest [x] PT [x] OT [] PMR- no skilled PT/OT needs    PULM  RA  sat > 94%    CV:  SBP goal:     RENAL:  Fluids: IVL, voiding  monitor BMP, f/u am labs    GI:  Diet: Regular Diet  GI prophylaxis [] not indicated [x] PPI [] other:  Bowel regimen [x] colace [x] senna [] other:    ENDO:   Goal euglycemia (-180)    HEME/ONC:  VTE prophylaxis: [x] SCDs [x] chemoprophylaxis [] high risk of DVT/PE on admission due to:  start SQL tonight    ID  Afebrile    f/u final pathology    updated at bedside     discussed with Dr Crowley  [id number]

## 2023-09-10 NOTE — DISCHARGE NOTE PROVIDER - CARE PROVIDER_API CALL
Wai Crowley  [address] [address]  Phone: [phone]  Fax: [phone]  Follow Up Time:

## 2023-09-10 NOTE — DISCHARGE NOTE PROVIDER - NSDCCPTREATMENT_GEN_ALL_CORE_FT
PRINCIPAL PROCEDURE  Procedure: Frontal craniotomy  Findings and Treatment: 9/7/2023      SECONDARY PROCEDURE  Procedure: Frontal craniotomy  Findings and Treatment:

## 2023-09-10 NOTE — DISCHARGE NOTE PROVIDER - NSDCFUSCHEDAPPT_GEN_ALL_CORE_FT
[facility]  [facility] [address]  Scheduled Appointment: 11/20/2023

## 2023-09-10 NOTE — PROGRESS NOTE ADULT - SUBJECTIVE AND OBJECTIVE BOX
SUBJECTIVE: Patient seen and examined.  Patient is doing well.  Patient wants to go home     Vital Signs Last 24 Hrs  T(C): 37 (10 Sep 2023 09:17), Max: 37 (09 Sep 2023 13:19)  T(F): 98.6 (10 Sep 2023 09:17), Max: 98.6 (09 Sep 2023 13:19)  HR: 67 (10 Sep 2023 09:17) (62 - 72)  BP: 100/64 (10 Sep 2023 09:17) (99/59 - 107/70)  BP(mean): --  RR: 18 (10 Sep 2023 09:17) (16 - 18)  SpO2: 94% (10 Sep 2023 09:17) (94% - 98%)    Parameters below as of 10 Sep 2023 09:17  Patient On (Oxygen Delivery Method): room air        PHYSICAL EXAM:    Constitutional: No Acute Distress     Neurological: AOx3, Following Commands, Moving all Extremities     Motor exam:          Upper extremity                         Delt     Bicep     Tricep    HG                                                 R         5/5        5/5        5/5       5/5                                               L          5/5        5/5        5/5       5/5          Lower extremity                        HF         KF        KE       DF         PF                                                  R        5/5        5/5        5/5       5/5         5/5                                               L         5/5        5/5       5/5       5/5          5/5                                                 Sensation: [x] intact to light touch  [] decreased:     Pulmonary: Clear to Auscultation, No rales, No rhonchi, No wheezes     Cardiovascular: S1, S2, Regular rate and rhythm     Gastrointestinal: Soft, Non-tender, Non-distended     Extremities: No calf tenderness     Incision: c/d/i + staples   LABS:                        10.9   6.48  )-----------( 182      ( 10 Sep 2023 07:26 )             34.5    09-10    140  |  106  |  13  ----------------------------<  111<H>  3.7   |  23  |  0.41<L>    Ca    8.0<L>      10 Sep 2023 07:26  Phos  2.7     09-09  Mg     2.1     09-09 09-09 @ 07:01  -  09-10 @ 07:00  --------------------------------------------------------  IN: 260 mL / OUT: 0 mL / NET: 260 mL    09-10 @ 07:01  -  09-10 @ 10:15  --------------------------------------------------------  IN: 240 mL / OUT: 0 mL / NET: 240 mL    MEDICATIONS:  Anticoagulation:   enoxaparin Injectable 40 milliGRAM(s) SubCutaneous <User Schedule>    Antibiotics:    Endo:  dexAMETHasone     Tablet 2 milliGRAM(s) Oral every 6 hours  dexAMETHasone     Tablet   Oral   levothyroxine 100 MICROGram(s) Oral daily    Neuro:  levETIRAcetam 500 milliGRAM(s) Oral two times a day  ondansetron Injectable 4 milliGRAM(s) IV Push every 6 hours PRN Nausea and/or Vomiting  oxyCODONE    IR 5 milliGRAM(s) Oral every 4 hours PRN Moderate Pain (4 - 6)    Cardiac:    Pulm:    GI/:  bisacodyl 5 milliGRAM(s) Oral every 12 hours  pantoprazole    Tablet 40 milliGRAM(s) Oral before breakfast  polyethylene glycol 3350 17 Gram(s) Oral daily  senna 2 Tablet(s) Oral at bedtime    Other:     DIET: regular     IMAGING:

## 2023-09-10 NOTE — DISCHARGE NOTE PROVIDER - NSDCFUADDAPPT_GEN_ALL_CORE_FT
Follow up with Dr. Crowley in 1-2 weeks.   Call office for appointment.    Signs and symptoms of medications:  You have been started on a new medication, oxycodone.  Oxycodone helps to control pain. Oxycodone is an additive medication so it is important to limit the use of this medication.  Side effects include nausea, vomiting, constipation, dry mouth, lightheadedness, dizziness or drowsiness.  It is important to tell your physician if you experience any of these side effects.  You have been started on a new medication, keppra.  Keppra helps control and prevent seizures.  The most common side effects include diarrhea or constipation, excessive sleepiness, irritability and mood changes.  Rare and sometimes serious side effects include rash.    You have been started on a new medication, decadron.  Decadron helps with swelling.  Some common side effects of decadron include increased appetite, irritability, difficulty sleeping, swelling in your ankles, heartburn, and increased sugars.  Please notify your doctor of any side effects.

## 2023-09-10 NOTE — DISCHARGE NOTE PROVIDER - HOSPITAL COURSE
58 year old right handed female with PMH of newly diagnosed Right Papillary Thyroid  Cancer, S/P Total Thyroidectomy and Right Neck Lymph Node Dissection at [facility] on 6/12/23,  who presents with Brain Tumor. Patient endorses Brain lesion was incidentally found after she presented to [facility] on 8/24 with syncopal episode, fever, and generalized weakness.  Patient states that while hospitalized she underwent imaging of the Brain which revealed large vasogenic edema in the anterior and inferior Right frontal lobe concerning for metastatic disease. Her hospital course was also complicated by E coli bacteremia (patient  is currently on Cephalexin).   9/7/23 s/p bifrontal craniotomy for resection of brain mass, final pathology pending.  9/8 LD removed, transferred to floor  9/9 dressing removed, no CSF leak complaints  9/10 lumbar drain dressing removed.      Patient was seen by physical therapy and recommended for home with no skilled PT needs.

## 2023-09-10 NOTE — DISCHARGE NOTE NURSING/CASE MANAGEMENT/SOCIAL WORK - NSDCPEFALRISK_GEN_ALL_CORE
For information on Fall & Injury Prevention, visit: [email or web address] OR  [email or web address] OR  https://www.cdc.gov/steadi/patient.html

## 2023-09-10 NOTE — DISCHARGE NOTE NURSING/CASE MANAGEMENT/SOCIAL WORK - PATIENT PORTAL LINK FT
You can access the FollowMyHealth Patient Portal offered by [facility] by registering at the following website: [email or web address]. By joining [facility]’s FollowMyHealth portal, you will also be able to view your health information using other applications (apps) compatible with our system.

## 2023-09-10 NOTE — DISCHARGE NOTE NURSING/CASE MANAGEMENT/SOCIAL WORK - NSDCVIVACCINE_GEN_ALL_CORE_FT
Tdap; 24-Aug-2023 11:34; Lorraine Garza (RN); [employer]; [id number] (Exp. Date: 06-Jun-2025); IntraMuscular; Deltoid Right.; 0.5 milliLiter(s); VIS (VIS Published: 09-May-2013, VIS Presented: 24-Aug-2023);

## 2023-09-11 PROBLEM — C73 MALIGNANT NEOPLASM OF THYROID GLAND: Chronic | Status: ACTIVE | Noted: 2023-09-05

## 2023-09-13 ENCOUNTER — APPOINTMENT (OUTPATIENT)
Dept: NEUROSURGERY | Facility: CLINIC | Age: 58
End: 2023-09-13
Payer: COMMERCIAL

## 2023-09-13 VITALS
HEIGHT: 66 IN | DIASTOLIC BLOOD PRESSURE: 70 MMHG | BODY MASS INDEX: 24.11 KG/M2 | SYSTOLIC BLOOD PRESSURE: 109 MMHG | OXYGEN SATURATION: 98 % | WEIGHT: 150 LBS | RESPIRATION RATE: 16 BRPM | HEART RATE: 78 BPM

## 2023-09-13 PROCEDURE — 99024 POSTOP FOLLOW-UP VISIT: CPT

## 2023-09-15 LAB — SURGICAL PATHOLOGY STUDY: SIGNIFICANT CHANGE UP

## 2023-09-18 ENCOUNTER — NON-APPOINTMENT (OUTPATIENT)
Age: 58
End: 2023-09-18

## 2023-09-20 ENCOUNTER — NON-APPOINTMENT (OUTPATIENT)
Age: 58
End: 2023-09-20

## 2023-09-20 ENCOUNTER — APPOINTMENT (OUTPATIENT)
Dept: NEUROSURGERY | Facility: CLINIC | Age: 58
End: 2023-09-20
Payer: COMMERCIAL

## 2023-09-20 VITALS
TEMPERATURE: 98.4 F | WEIGHT: 148 LBS | HEART RATE: 74 BPM | DIASTOLIC BLOOD PRESSURE: 67 MMHG | SYSTOLIC BLOOD PRESSURE: 120 MMHG | RESPIRATION RATE: 16 BRPM | OXYGEN SATURATION: 99 % | HEIGHT: 66 IN | BODY MASS INDEX: 23.78 KG/M2

## 2023-09-20 DIAGNOSIS — G93.89 OTHER SPECIFIED DISORDERS OF BRAIN: ICD-10-CM

## 2023-09-20 PROCEDURE — 99024 POSTOP FOLLOW-UP VISIT: CPT

## 2023-09-20 RX ORDER — LEVETIRACETAM 250 MG/1
250 TABLET, FILM COATED ORAL EVERY MORNING
Qty: 30 | Refills: 0 | Status: ACTIVE | COMMUNITY
Start: 2023-09-20 | End: 1900-01-01

## 2023-09-20 RX ORDER — LEVETIRACETAM 500 MG/1
500 TABLET, FILM COATED ORAL AS DIRECTED
Qty: 30 | Refills: 0 | Status: ACTIVE | COMMUNITY
Start: 2023-09-20 | End: 1900-01-01

## 2023-09-22 LAB
THYROGLOB SERPL-MCNC: 1.8 NG/ML — SIGNIFICANT CHANGE UP
THYROGLOB SERPL-MCNC: <1 IU/ML — SIGNIFICANT CHANGE UP
THYROGLOB SERPL-MCNC: SIGNIFICANT CHANGE UP NG/ML

## 2023-09-23 ENCOUNTER — APPOINTMENT (OUTPATIENT)
Dept: NUCLEAR MEDICINE | Facility: IMAGING CENTER | Age: 58
End: 2023-09-23

## 2023-10-16 ENCOUNTER — NON-APPOINTMENT (OUTPATIENT)
Age: 58
End: 2023-10-16

## 2023-10-18 ENCOUNTER — NON-APPOINTMENT (OUTPATIENT)
Age: 58
End: 2023-10-18

## 2023-10-24 NOTE — PHYSICAL THERAPY INITIAL EVALUATION ADULT - LEVEL OF INDEPENDENCE: SUPINE/SIT, REHAB EVAL
Went to D/C patient and patient states that she has no one to care for her and that she is hurting and can't take care of her self, Patient states she wants to be admitted to be monitored for the night. Provider made aware, house supervisor made aware.    independent/supervision

## 2023-11-14 ENCOUNTER — RESULT REVIEW (OUTPATIENT)
Age: 58
End: 2023-11-14

## 2023-11-20 ENCOUNTER — APPOINTMENT (OUTPATIENT)
Dept: NUCLEAR MEDICINE | Facility: HOSPITAL | Age: 58
End: 2023-11-20
Payer: COMMERCIAL

## 2023-11-20 ENCOUNTER — OUTPATIENT (OUTPATIENT)
Dept: OUTPATIENT SERVICES | Facility: HOSPITAL | Age: 58
LOS: 1 days | End: 2023-11-20
Payer: COMMERCIAL

## 2023-11-20 ENCOUNTER — RESULT REVIEW (OUTPATIENT)
Age: 58
End: 2023-11-20

## 2023-11-20 DIAGNOSIS — Z98.890 OTHER SPECIFIED POSTPROCEDURAL STATES: Chronic | ICD-10-CM

## 2023-11-20 DIAGNOSIS — Z98.51 TUBAL LIGATION STATUS: Chronic | ICD-10-CM

## 2023-11-20 DIAGNOSIS — E89.0 POSTPROCEDURAL HYPOTHYROIDISM: Chronic | ICD-10-CM

## 2023-11-20 DIAGNOSIS — C73 MALIGNANT NEOPLASM OF THYROID GLAND: ICD-10-CM

## 2023-11-20 LAB
HCG SERPL-ACNC: <2 MIU/ML — SIGNIFICANT CHANGE UP
HCG SERPL-ACNC: <2 MIU/ML — SIGNIFICANT CHANGE UP

## 2023-11-20 PROCEDURE — 79005 NUCLEAR RX ORAL ADMIN: CPT | Mod: 26

## 2023-11-20 PROCEDURE — 99203 OFFICE O/P NEW LOW 30 MIN: CPT

## 2023-11-22 NOTE — CHART NOTE - NSCHARTNOTEFT_GEN_A_CORE
CAPRINI SCORE [CLOT] Score on Admission for     AGE RELATED RISK FACTORS                                                       MOBILITY RELATED FACTORS  [ ] Age 41-60 years                                            (1 Point)                  [ ] Bed rest                                                        (1 Point)  [x ] Age: 61-74 years                                           (2 Points)                 [ ] Plaster cast                                                   (2 Points)  [ ] Age= 75 years                                              (3 Points)                 [ ] Bed bound for more than 72 hours                 (2 Points)    DISEASE RELATED RISK FACTORS                                               GENDER SPECIFIC FACTORS  [ ] Edema in the lower extremities                       (1 Point)                  [ ] Pregnancy                                                     (1 Point)  [ ] Varicose veins                                               (1 Point)                  [ ] Post-partum < 6 weeks                                   (1 Point)             [ ] BMI > 25 Kg/m2                                            (1 Point)                  [ ] Hormonal therapy  or oral contraception          (1 Point)                 [ ] Sepsis (in the previous month)                        (1 Point)                  [ ] History of pregnancy complications                 (1 point)  [ ] Pneumonia or serious lung disease                                               [ ] Unexplained or recurrent                     (1 Point)           (in the previous month)                               (1 Point)  [ ] Abnormal pulmonary function test                     (1 Point)                 SURGERY RELATED RISK FACTORS (include planned surgeries)  [ ] Acute myocardial infarction                              (1 Point)                 [ ]  Section                                             (1 Point)  [ ] Congestive heart failure (in the previous month)  (1 Point)         [ ] Minor surgery                                                  (1 Point)   [ ] Inflammatory bowel disease                             (1 Point)                 [ ] Arthroscopic surgery                                        (2 Points)  [ ] Central venous access                                      (2 Points)                [x ] General surgery lasting more than 45 minutes   (2 Points)       [ ] Stroke (in the previous month)                          (5 Points)               [ ] Elective arthroplasty                                         (5 Points)            [x ] current or past malignancy                              (2 Points)                                                                                                       HEMATOLOGY RELATED FACTORS                                                 TRAUMA RELATED RISK FACTORS  [ ] Prior episodes of VTE                                     (3 Points)                [ ] Fracture of the hip, pelvis, or leg                       (5 Points)  [ ] Positive family history for VTE                         (3 Points)                 [ ] Acute spinal cord injury (in the previous month)  (5 Points)  [ ] Prothrombin 65427 A                                     (3 Points)                 [ ] Paralysis  (less than 1 month)                             (5 Points)  [ ] Factor V Leiden                                             (3 Points)                  [ ] Multiple Trauma within 1 month                        (5 Points)  [ ] Lupus anticoagulants                                     (3 Points)                                                           [ ] Anticardiolipin antibodies                               (3 Points)                                                       [ ] High homocysteine in the blood                      (3 Points)                                             [ ] Other congenital or acquired thrombophilia      (3 Points)                                                [ ] Heparin induced thrombocytopenia                  (3 Points)                                          Total Score [    6      ]    Risk:  Very low 0   Low 1 to 2   Moderate 3 to 4   High =5       VTE Prophylasix Recommednations:  [x ] mechanical pneumatic compression devices                                      [ ] contraindicated: _____________________  [ ] chemo prophylasix                                                                                   [x] contraindicated _post bleed 0____________________    **** HIGH LIKELIHOOD DVT PRESENT ON ADMISSION  [x ] (please order LE dopplers within 24 hours of admission)
Patient had resection of meningioma.  Patient noted to have hypocalcemia on 9/7 with a level of 7.6 that was supplemented with calcium gluconate.
Lumbar drain cleared for removal per neurosurgeon Dr. Crowley. Area prepped, sterile. drain removed under sterile condition. 1 Absorbable suture placed for closure. Patient to be positioned flat for 2 hours, monitor site for leaking. Patient tolerated procedure well.

## 2023-11-24 ENCOUNTER — RESULT REVIEW (OUTPATIENT)
Age: 58
End: 2023-11-24

## 2023-11-24 ENCOUNTER — APPOINTMENT (OUTPATIENT)
Dept: NUCLEAR MEDICINE | Facility: HOSPITAL | Age: 58
End: 2023-11-24

## 2023-11-24 PROCEDURE — 78018 THYROID MET IMAGING BODY: CPT | Mod: 26

## 2023-11-24 PROCEDURE — 84702 CHORIONIC GONADOTROPIN TEST: CPT

## 2023-11-24 PROCEDURE — 78830 RP LOCLZJ TUM SPECT W/CT 1: CPT | Mod: 26

## 2023-11-24 PROCEDURE — 36415 COLL VENOUS BLD VENIPUNCTURE: CPT

## 2023-11-24 PROCEDURE — 78830 RP LOCLZJ TUM SPECT W/CT 1: CPT

## 2023-11-24 PROCEDURE — 78018 THYROID MET IMAGING BODY: CPT

## 2023-11-24 PROCEDURE — 79005 NUCLEAR RX ORAL ADMIN: CPT

## 2023-11-24 PROCEDURE — A9517: CPT

## 2023-12-13 ENCOUNTER — APPOINTMENT (OUTPATIENT)
Dept: NEUROSURGERY | Facility: CLINIC | Age: 58
End: 2023-12-13
Payer: COMMERCIAL

## 2023-12-13 ENCOUNTER — APPOINTMENT (OUTPATIENT)
Dept: MRI IMAGING | Facility: IMAGING CENTER | Age: 58
End: 2023-12-13
Payer: COMMERCIAL

## 2023-12-13 ENCOUNTER — OUTPATIENT (OUTPATIENT)
Dept: OUTPATIENT SERVICES | Facility: HOSPITAL | Age: 58
LOS: 1 days | End: 2023-12-13
Payer: COMMERCIAL

## 2023-12-13 DIAGNOSIS — Z98.51 TUBAL LIGATION STATUS: Chronic | ICD-10-CM

## 2023-12-13 DIAGNOSIS — Z98.890 OTHER SPECIFIED POSTPROCEDURAL STATES: Chronic | ICD-10-CM

## 2023-12-13 DIAGNOSIS — E89.0 POSTPROCEDURAL HYPOTHYROIDISM: Chronic | ICD-10-CM

## 2023-12-13 DIAGNOSIS — G93.89 OTHER SPECIFIED DISORDERS OF BRAIN: ICD-10-CM

## 2023-12-13 DIAGNOSIS — Z00.8 ENCOUNTER FOR OTHER GENERAL EXAMINATION: ICD-10-CM

## 2023-12-13 PROCEDURE — A9585: CPT

## 2023-12-13 PROCEDURE — 99213 OFFICE O/P EST LOW 20 MIN: CPT

## 2023-12-13 PROCEDURE — 70553 MRI BRAIN STEM W/O & W/DYE: CPT

## 2023-12-13 PROCEDURE — 70553 MRI BRAIN STEM W/O & W/DYE: CPT | Mod: 26

## 2023-12-14 NOTE — REASON FOR VISIT
[Follow-Up: _____] : a [unfilled] follow-up visit [FreeTextEntry1] : 58-year-old right-handed female with PMH of newly diagnosed Right Papillary Thyroid Cancer, S/P Total Thyroidectomy and Right Neck Lymph Node Dissection at [facility] on 6/12/23 who presents with Brain Tumor.  Patient endorses a brain lesion was incidentally found after she presented to [facility] on 8/24 with syncopal. Episode, fever, and generalized weakness.  Patient states that while hospitalized she underwent imaging of the Brain which revealed large vasogenic edema in the anterior and inferior Right frontal lobe concerning for metastatic disease.   9/7/23 s/p bifrontal craniotomy for resection of brain mass. Path: Right brain lesion- Meningioma, WHO Grade 1 Right frontal lesion biopsy- Meningioma, WHO Grade 1  Today she reports feeling well, denies nasal drainage, reports feeling well.  Neuro Exam: intact Surgical incision- well healed

## 2023-12-14 NOTE — REASON FOR VISIT
[Follow-Up: _____] : a [unfilled] follow-up visit [FreeTextEntry1] : 58-year-old right-handed female with PMH of newly diagnosed Right Papillary Thyroid Cancer, S/P Total Thyroidectomy and Right Neck Lymph Node Dissection at [facility] on 6/12/23 who presents with Brain Tumor.  Patient endorses a brain lesion was incidentally found after she presented to [facility] on 8/24 with syncopal. Episode, fever, and generalized weakness.  Patient states that while hospitalized she underwent imaging of the Brain which revealed large vasogenic edema in the anterior and inferior Right frontal lobe concerning for metastatic disease.   9/7/23 s/p bifrontal craniotomy for resection of brain mass. Path: Right brain lesion- Meningioma, WHO Grade 1 Right frontal lesion biopsy- Meningioma, WHO Grade 1  Today she reports feeling well, denies nasal drainage, reports feeling well.  Neuro Exam: intact Surgical incision- well healed

## 2023-12-14 NOTE — ASSESSMENT
[FreeTextEntry1] : Plan:  MRI head w/wo contrast in 6 months followed by office visit - 6/2024 Ok to return back to work on 1/8/2024. Contact office with any questions or concerns as needed  .Wai VAZQUEZ evaluated the patient with the nurse practitioner Joann Barthelemy and established the plan of care. I personally discuss this patient with the nurse practitioner at the time of the visit. I agree with the assessment and plan as written, unless noted below.

## 2023-12-14 NOTE — PHYSICAL EXAM
[General Appearance - Alert] : alert [General Appearance - In No Acute Distress] : in no acute distress [General Appearance - Well Nourished] : well nourished [Well-Healed] : well-healed [Oriented To Time, Place, And Person] : oriented to person, place, and time [Impaired Insight] : insight and judgment were intact [Affect] : the affect was normal [Person] : oriented to person [Place] : oriented to place [Time] : oriented to time [Short Term Intact] : short term memory intact [Remote Intact] : remote memory intact [Registration Intact] : recent registration memory intact [Motor Tone] : muscle tone was normal in all four extremities [Motor Strength] : muscle strength was normal in all four extremities [Involuntary Movements] : no involuntary movements were seen [PERRL With Normal Accommodation] : pupils were equal in size, round, reactive to light, with normal accommodation [Extraocular Movements] : extraocular movements were intact [] : no respiratory distress [Abnormal Walk] : normal gait [Romberg's Sign] : Romberg's sign was negtive

## 2023-12-18 ENCOUNTER — APPOINTMENT (OUTPATIENT)
Dept: ENDOCRINOLOGY | Facility: CLINIC | Age: 58
End: 2023-12-18

## 2024-01-12 ENCOUNTER — APPOINTMENT (OUTPATIENT)
Dept: ENDOCRINOLOGY | Facility: CLINIC | Age: 59
End: 2024-01-12
Payer: COMMERCIAL

## 2024-01-12 VITALS
OXYGEN SATURATION: 99 % | SYSTOLIC BLOOD PRESSURE: 138 MMHG | BODY MASS INDEX: 22.5 KG/M2 | HEART RATE: 63 BPM | WEIGHT: 140 LBS | DIASTOLIC BLOOD PRESSURE: 70 MMHG | HEIGHT: 66 IN

## 2024-01-12 PROCEDURE — G2211 COMPLEX E/M VISIT ADD ON: CPT

## 2024-01-12 PROCEDURE — 99215 OFFICE O/P EST HI 40 MIN: CPT

## 2024-01-12 NOTE — ASSESSMENT
[FreeTextEntry1] : 1. Thyroid Cancer 2. Postsurgical Hypothyroidism  Surgery: 6/12/2023 total thyroidectomy with central and right LND at [facility], Dr. Lane Perez Multifocal PTC, classic/conventional. Right lobe/isthmus 1.2 cm, additional foci of microcarcinoma 0.45 cm and <0.1 cm. Margins positive. No angioinvasion. Lymphatic invasion present. No increased mitoses. Perineural invasion present. Gross extrathyroidal extension identified, extending only to strap muscles. 2/30 LN positive for metastatic thyroid cancer, largest 1.7 cm. Both LN right level 2. No extranodal extension. Molecular testing sent from surgical sample: BRAF [id number] +ve 2015 EMILEE Risk: High (gross extrathyroidal extension) AJCC 8th edition TNM Stage: [id number], Stage 2 NICHOLS Treatment: 146 mCi 11/20/2023  Surveillance/postsurgical course: 7/6/2023: Tg <0.2, negative TgAb, TSH 0.07 8/2023: Plan for NICHOLS therapy. She had severe reaction to rTSH injections resulting in syncope and hospital admission. CT/MRI as well as pretherapy NICHOLS scans were done which showed 1.7 cm right frontal lobe lesion with surrounding vasogenic edema, with iodine uptake in the same area concerning for CNS metastasis from thyroid cancer. She underwent surgical resection of this lesion in 9/2023 with Dr. Crowley, and final pathology was suggestive of meningioma, negative staining for TTF-1 and PAX8 markers, ruling out thyroid origin. 11/2023: She underwent NICHOLS therapy after levothyroxine withdrawal. Stimulated Tg 4.2, negative TgAb, TSH 62.2. Posttherapy scan with bilateral thyroid bed remnant and iodine avid left retropharyngeal space disease. No distant iodine avid disease, CNS uptake seen on 8/2023 scans no longer seen.  PLAN: - Maintain TSH <0.1 for now given EMILEE high risk cancer.  If excellent response, can relax TSH goal to 0.1-0.5 for at least 5 years. -Check TFTs, thyroglobulin -Continue levothyroxine 100 mcg daily, taking appropriately -First surveillance thyroid ultrasound can be approximately 4-5 months after NICHOLS therapy, prescription provided today -We discussed usual management and surveillance of EMILEE high risk thyroid cancer, with serial thyroglobulin monitoring and surveillance ultrasounds  3. Hyperlipidemia, health maintenance -Check CMP, lipid panel -Check 25 OH vitamin D, vitamin B12 -She had steroid-induced hyperglycemia in the hospital when receiving Decadron for CNS swelling associated with meningioma.  Check A1c to rule out diabetes.  RTC in 3-4 months with previsit ultrasound.  Lei Patiño MD [facility] at [place]  [address], [address] Ph: [phone] Fax: [phone]

## 2024-01-12 NOTE — HISTORY OF PRESENT ILLNESS
[FreeTextEntry1] : CHIEF COMPLAINT: Thyroid cancer PCP Dr. Patricia Payan Heme-onc: Dr. Yeung Surgeon[facility]  HISTORY OF PRESENTING ILLNESS: The patient is a 58-year-old female being seen in the office today for evaluation of thyroid cancer.  She is originally from  [facility].  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  Also with meningioma status postresection in 9/2023, follows with neurosurgery.  Initially discovered nodule: Incidentally discovered lump in the neck April 2023, self discovered. Thyroid ultrasound 4/27/2023: RUP 1 cm TI-RADS 4 nodule, abnormal appearing 1.5 cm right level 3 lymph node. FNA: 5/26/2023: RUP nodule and right level 3 LN positive for malignancy, PTC.  Surgery: 6/12/2023 total thyroidectomy with central and right LND at [facility], Dr. Lane Perez Multifocal PTC, classic/conventional. Right lobe/isthmus 1.2 cm, additional foci of microcarcinoma 0.45 cm and <0.1 cm. Margins positive.  No angioinvasion.  Lymphatic invasion present. No increased mitoses.  Perineural invasion present. Gross extrathyroidal extension identified, extending only to strap muscles. 2/30 LN positive for metastatic thyroid cancer, largest 1.7 cm.  Both LN right level 2.  No extranodal extension. Molecular testing sent from surgical sample: BRAF V600E +ve 2015 EMILEE Risk: High (gross extrathyroidal extension) AJCC 8th edition TNM Stage: [id number], Stage 2 NICHOLS Treatment: 146 mCi 11/20/2023  Surveillance/postsurgical course:  7/6/2023: Tg <0.2, negative TgAb, TSH 0.07 8/2023: Plan for NICHOLS therapy.  She had severe reaction to rTSH injections resulting in syncope and hospital admission.  CT/MRI as well as pretherapy NICHOLS scans were done which showed 1.7 cm right frontal lobe lesion with surrounding vasogenic edema, with iodine uptake in the same area concerning for CNS metastasis from thyroid cancer.  She underwent surgical resection of this lesion in 9/2023 with Dr. Crowley, and final pathology was suggestive of meningioma, negative staining for TTF-1 and PAX8 markers, ruling out thyroid origin.  11/2023: She underwent NICHOLS therapy after levothyroxine withdrawal.  Stimulated Tg 4.2, negative TgAb, TSH 62.2.  Posttherapy scan with bilateral thyroid bed remnant and iodine avid left retropharyngeal space disease.  No distant iodine avid disease, CNS uptake seen on 8/2023 scans no longer seen.  Postsurgical Hypothyroidism:  She is currently taking 100 mcg daily of levothyroxine. Reports compliance with medication.  She is taking it appropriately, on an empty stomach at least 30 minutes before food.  Denies any symptoms of hypo or hyperthyroidism at this time.  Energy levels are good, no constipation/diarrhea, weight is stable, no palpitations/tremors.  Endorses hot flashes, she is perimenopausal.  Endorses joint pains.  No family history of thyroid cancer or thyroid disease.  No personal history of radiation exposure to the head and neck area.   She was told she has a normal calcium postoperatively, did not require any calcium supplements.   Hyperlipidemia: Takes rosuvastatin 5 mg daily

## 2024-01-15 LAB
25(OH)D3 SERPL-MCNC: 27.1 NG/ML
ALBUMIN SERPL ELPH-MCNC: 4.5 G/DL
ALP BLD-CCNC: 62 U/L
ALT SERPL-CCNC: 18 U/L
ANION GAP SERPL CALC-SCNC: 11 MMOL/L
AST SERPL-CCNC: 25 U/L
BILIRUB SERPL-MCNC: 0.8 MG/DL
BUN SERPL-MCNC: 12 MG/DL
CALCIUM SERPL-MCNC: 9.6 MG/DL
CHLORIDE SERPL-SCNC: 108 MMOL/L
CHOLEST SERPL-MCNC: 171 MG/DL
CO2 SERPL-SCNC: 26 MMOL/L
CREAT SERPL-MCNC: 0.71 MG/DL
EGFR: 98 ML/MIN/1.73M2
ESTIMATED AVERAGE GLUCOSE: 108 MG/DL
GLUCOSE SERPL-MCNC: 88 MG/DL
HBA1C MFR BLD HPLC: 5.4 %
HDLC SERPL-MCNC: 56 MG/DL
LDLC SERPL CALC-MCNC: 102 MG/DL
NONHDLC SERPL-MCNC: 115 MG/DL
POTASSIUM SERPL-SCNC: 4.6 MMOL/L
PROT SERPL-MCNC: 6.5 G/DL
SODIUM SERPL-SCNC: 145 MMOL/L
T4 FREE SERPL-MCNC: 2 NG/DL
THYROGLOB AB SERPL-ACNC: <20 IU/ML
THYROGLOB SERPL-MCNC: <0.2 NG/ML
TRIGL SERPL-MCNC: 67 MG/DL
TSH SERPL-ACNC: 0.1 UIU/ML
VIT B12 SERPL-MCNC: 618 PG/ML

## 2024-01-18 LAB
CLINICAL BIOCHEMIST REVIEW: NORMAL
THYROGLOB SERPL-MCNC: <0.2 NG/ML

## 2024-01-26 ENCOUNTER — APPOINTMENT (OUTPATIENT)
Dept: ULTRASOUND IMAGING | Facility: IMAGING CENTER | Age: 59
End: 2024-01-26
Payer: COMMERCIAL

## 2024-01-26 ENCOUNTER — OUTPATIENT (OUTPATIENT)
Dept: OUTPATIENT SERVICES | Facility: HOSPITAL | Age: 59
LOS: 1 days | End: 2024-01-26
Payer: COMMERCIAL

## 2024-01-26 ENCOUNTER — APPOINTMENT (OUTPATIENT)
Dept: MAMMOGRAPHY | Facility: IMAGING CENTER | Age: 59
End: 2024-01-26
Payer: COMMERCIAL

## 2024-01-26 DIAGNOSIS — Z00.8 ENCOUNTER FOR OTHER GENERAL EXAMINATION: ICD-10-CM

## 2024-01-26 DIAGNOSIS — Z98.890 OTHER SPECIFIED POSTPROCEDURAL STATES: Chronic | ICD-10-CM

## 2024-01-26 DIAGNOSIS — Z98.51 TUBAL LIGATION STATUS: Chronic | ICD-10-CM

## 2024-01-26 DIAGNOSIS — E89.0 POSTPROCEDURAL HYPOTHYROIDISM: Chronic | ICD-10-CM

## 2024-01-26 PROCEDURE — 77063 BREAST TOMOSYNTHESIS BI: CPT | Mod: 26

## 2024-01-26 PROCEDURE — 77067 SCR MAMMO BI INCL CAD: CPT | Mod: 26

## 2024-01-26 PROCEDURE — 77067 SCR MAMMO BI INCL CAD: CPT

## 2024-01-26 PROCEDURE — 76641 ULTRASOUND BREAST COMPLETE: CPT

## 2024-01-26 PROCEDURE — 77063 BREAST TOMOSYNTHESIS BI: CPT

## 2024-01-26 PROCEDURE — 76641 ULTRASOUND BREAST COMPLETE: CPT | Mod: 26,50

## 2024-03-26 ENCOUNTER — OUTPATIENT (OUTPATIENT)
Dept: OUTPATIENT SERVICES | Facility: HOSPITAL | Age: 59
LOS: 1 days | End: 2024-03-26
Payer: COMMERCIAL

## 2024-03-26 ENCOUNTER — APPOINTMENT (OUTPATIENT)
Dept: ULTRASOUND IMAGING | Facility: IMAGING CENTER | Age: 59
End: 2024-03-26
Payer: COMMERCIAL

## 2024-03-26 DIAGNOSIS — C73 MALIGNANT NEOPLASM OF THYROID GLAND: ICD-10-CM

## 2024-03-26 DIAGNOSIS — Z98.890 OTHER SPECIFIED POSTPROCEDURAL STATES: Chronic | ICD-10-CM

## 2024-03-26 DIAGNOSIS — Z98.51 TUBAL LIGATION STATUS: Chronic | ICD-10-CM

## 2024-03-26 DIAGNOSIS — E89.0 POSTPROCEDURAL HYPOTHYROIDISM: Chronic | ICD-10-CM

## 2024-03-26 PROCEDURE — 76536 US EXAM OF HEAD AND NECK: CPT | Mod: 26

## 2024-03-26 PROCEDURE — 76536 US EXAM OF HEAD AND NECK: CPT

## 2024-04-12 ENCOUNTER — APPOINTMENT (OUTPATIENT)
Dept: ENDOCRINOLOGY | Facility: CLINIC | Age: 59
End: 2024-04-12
Payer: COMMERCIAL

## 2024-04-12 VITALS
DIASTOLIC BLOOD PRESSURE: 72 MMHG | HEART RATE: 58 BPM | SYSTOLIC BLOOD PRESSURE: 120 MMHG | WEIGHT: 142 LBS | OXYGEN SATURATION: 98 % | HEIGHT: 66 IN | BODY MASS INDEX: 22.82 KG/M2

## 2024-04-12 DIAGNOSIS — E89.0 POSTPROCEDURAL HYPOTHYROIDISM: ICD-10-CM

## 2024-04-12 DIAGNOSIS — E78.5 HYPERLIPIDEMIA, UNSPECIFIED: ICD-10-CM

## 2024-04-12 DIAGNOSIS — C73 MALIGNANT NEOPLASM OF THYROID GLAND: ICD-10-CM

## 2024-04-12 DIAGNOSIS — C77.0 MALIGNANT NEOPLASM OF THYROID GLAND: ICD-10-CM

## 2024-04-12 LAB
25(OH)D3 SERPL-MCNC: 43.2 NG/ML
ALBUMIN SERPL ELPH-MCNC: 4.4 G/DL
ALP BLD-CCNC: 77 U/L
ALT SERPL-CCNC: 10 U/L
ANION GAP SERPL CALC-SCNC: 9 MMOL/L
AST SERPL-CCNC: 24 U/L
BILIRUB SERPL-MCNC: 0.9 MG/DL
BUN SERPL-MCNC: 15 MG/DL
CALCIUM SERPL-MCNC: 10 MG/DL
CHLORIDE SERPL-SCNC: 106 MMOL/L
CHOLEST SERPL-MCNC: 260 MG/DL
CO2 SERPL-SCNC: 30 MMOL/L
CREAT SERPL-MCNC: 0.74 MG/DL
EGFR: 94 ML/MIN/1.73M2
GLUCOSE SERPL-MCNC: 93 MG/DL
HCT VFR BLD CALC: 41.1 %
HDLC SERPL-MCNC: 66 MG/DL
HGB BLD-MCNC: 13.1 G/DL
LDLC SERPL CALC-MCNC: 177 MG/DL
MCHC RBC-ENTMCNC: 27.3 PG
MCHC RBC-ENTMCNC: 31.9 GM/DL
MCV RBC AUTO: 85.8 FL
NONHDLC SERPL-MCNC: 194 MG/DL
PLATELET # BLD AUTO: 198 K/UL
POTASSIUM SERPL-SCNC: 4.9 MMOL/L
PROT SERPL-MCNC: 6.8 G/DL
RBC # BLD: 4.79 M/UL
RBC # FLD: 12.9 %
SODIUM SERPL-SCNC: 144 MMOL/L
T4 FREE SERPL-MCNC: 1.3 NG/DL
THYROGLOB AB SERPL-ACNC: <20 IU/ML
THYROGLOB SERPL-MCNC: <0.2 NG/ML
TRIGL SERPL-MCNC: 96 MG/DL
TSH SERPL-ACNC: 0.11 UIU/ML
WBC # FLD AUTO: 4.71 K/UL

## 2024-04-12 PROCEDURE — 99215 OFFICE O/P EST HI 40 MIN: CPT

## 2024-04-12 PROCEDURE — G2211 COMPLEX E/M VISIT ADD ON: CPT

## 2024-04-12 NOTE — ASSESSMENT
[FreeTextEntry1] : 1. Thyroid Cancer 2. Postsurgical Hypothyroidism  Surgery: 6/12/2023 total thyroidectomy with central and right LND at [facility], Dr. Lane Perez Multifocal PTC, classic/conventional. Right lobe/isthmus 1.2 cm, additional foci of microcarcinoma 0.45 cm and <0.1 cm. Margins positive. No angioinvasion. Lymphatic invasion present. No increased mitoses. Perineural invasion present. Gross extrathyroidal extension identified, extending only to strap muscles. 2/30 LN positive for metastatic thyroid cancer, largest 1.7 cm. Both LN right level 2. No extranodal extension. Molecular testing sent from surgical sample: BRAF [id number] +ve 2015 EMILEE Risk: High (gross extrathyroidal extension) AJCC 8th edition TNM Stage: [id number], Stage 2 NICHOLS Treatment: 146 mCi 11/20/2023 Response: Excellent  Surveillance/postsurgical course: 7/6/2023: Tg <0.2, negative TgAb, TSH 0.07 8/2023: Plan for NICHOLS therapy. She had severe reaction to rTSH injections resulting in syncope and hospital admission. CT/MRI as well as pretherapy NICHOLS scans were done which showed 1.7 cm right frontal lobe lesion with surrounding vasogenic edema, with iodine uptake in the same area concerning for CNS metastasis from thyroid cancer. She underwent surgical resection of this lesion in 9/2023 with Dr. Crowley, and final pathology was suggestive of meningioma, negative staining for TTF-1 and PAX8 markers, ruling out thyroid origin. 11/2023: She underwent NICHOLS therapy after levothyroxine withdrawal. Stimulated Tg 4.2, negative TgAb, TSH 62.2. Posttherapy scan with bilateral thyroid bed remnant and iodine avid left retropharyngeal space disease. No distant iodine avid disease, CNS uptake seen on 8/2023 scans no longer seen. 1/2024 and 4/2024: Tg<0.2, negative TgAb Neck US 3/26/2024: GEN  PLAN: -TSH goal 0.1-0.5 for EMILEE high risk thyroid cancer but with excellent response.  Will keep TSH suppressed <0.5 for at least 5 years from initial management. -Check TFTs, thyroglobulin every 6 months for now -Continue levothyroxine 100 mcg daily, taking appropriately -Repeat neck US in 1 year, around 3/2025 -We discussed usual management and surveillance of thyroid cancer, with neck exams, serial thyroglobulin monitoring and surveillance ultrasounds -She is following with Dr. Crowley regarding meningioma, she will repeat MRI brain in 6/2024  3. Hyperlipidemia, health maintenance -Continue rosuvastatin 5 mg daily, restarted after 4/2024 labs -A1c 5.4% from 1/2024 -Can consider getting bone density scan around age 60 given TSH suppression  RTC in 6 months with previsit labs.  Lei Patiño MD [facility] at [address], [address] Ph: [phone] Fax: [phone]  I have spent 40 minutes of time on the encounter. Greater than 50% of the face to face encounter time was spent on counseling and/or coordination of care for management of thyroid cancer. Total time was spent on review of prior records, labs and imaging studies, history and physical examination, documentation of this encounter, placing orders, counseling and coordination of care, all on the date of this visit.

## 2024-04-12 NOTE — HISTORY OF PRESENT ILLNESS
[FreeTextEntry1] : CHIEF COMPLAINT: Thyroid cancer PCP Dr. Patricia Payan Surgeon: Dr. Perez [facility]  HISTORY OF PRESENTING ILLNESS: The patient is a 58-year-old female being seen in the office today for evaluation of thyroid cancer.  She is originally from  [facility].  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  Also with meningioma status postresection in 9/2023, follows with neurosurgery.  Initially discovered nodule: Incidentally discovered lump in the neck April 2023, self discovered. Thyroid ultrasound 4/27/2023: RUP 1 cm TI-RADS 4 nodule, abnormal appearing 1.5 cm right level 3 lymph node. FNA: 5/26/2023: RUP nodule and right level 3 LN positive for malignancy, PTC.  Surgery: 6/12/2023 total thyroidectomy with central and right LND at [facility], Dr. Lane Perez Multifocal PTC, classic/conventional. Right lobe/isthmus 1.2 cm, additional foci of microcarcinoma 0.45 cm and <0.1 cm. Margins positive.  No angioinvasion.  Lymphatic invasion present. No increased mitoses.  Perineural invasion present. Gross extrathyroidal extension identified, extending only to strap muscles. 2/30 LN positive for metastatic thyroid cancer, largest 1.7 cm.  Both LN right level 2.  No extranodal extension. Molecular testing sent from surgical sample: BRAF V600E +ve 2015 EMILEE Risk: High (gross extrathyroidal extension) AJCC 8th edition TNM Stage: [id number], Stage 2 NICHOLS Treatment: 146 mCi 11/20/2023  Surveillance/postsurgical course:  7/6/2023: Tg <0.2, negative TgAb, TSH 0.07 8/2023: Plan for NICHOLS therapy.  She had severe reaction to rTSH injections resulting in syncope and hospital admission.  CT/MRI as well as pretherapy NICHOLS scans were done which showed 1.7 cm right frontal lobe lesion with surrounding vasogenic edema, with iodine uptake in the same area concerning for CNS metastasis from thyroid cancer.  She underwent surgical resection of this lesion in 9/2023 with Dr. Crowley, and final pathology was suggestive of meningioma, negative staining for TTF-1 and PAX8 markers, ruling out thyroid origin.  11/2023: She underwent NICHOLS therapy after levothyroxine withdrawal.  Stimulated Tg 4.2, negative TgAb, TSH 62.2.  Posttherapy scan with bilateral thyroid bed remnant and iodine avid left retropharyngeal space disease.  No distant iodine avid disease, CNS uptake seen on 8/2023 scans no longer seen. 1/2024 and 4/2024: Tg<0.2, negative TgAb Neck US 3/26/2024: GEN  Postsurgical Hypothyroidism:  She is currently taking 100 mcg daily of levothyroxine. Reports compliance with medication.  She is taking it appropriately, on an empty stomach at least 30 minutes before food.  Denies any symptoms of hypo or hyperthyroidism at this time.  Energy levels are good, no constipation/diarrhea, weight is stable, no palpitations/tremors.  +hot flashes, she is perimenopausal.  +joint pains.  No family history of thyroid cancer or thyroid disease.  No personal history of radiation exposure to the head and neck area.   She was told she has a normal calcium postoperatively, did not require any calcium supplements.   Hyperlipidemia: Takes rosuvastatin 5 mg daily.  She stopped it for some time because of muscle aches, and labs from 4/2024 with elevated lipids, she restarted the statin after this.

## 2024-04-30 RX ORDER — LEVOTHYROXINE SODIUM 0.1 MG/1
100 TABLET ORAL DAILY
Qty: 90 | Refills: 3 | Status: ACTIVE | COMMUNITY
Start: 2023-11-20 | End: 1900-01-01

## 2024-05-22 ENCOUNTER — APPOINTMENT (OUTPATIENT)
Dept: MRI IMAGING | Facility: IMAGING CENTER | Age: 59
End: 2024-05-22
Payer: COMMERCIAL

## 2024-05-22 ENCOUNTER — OUTPATIENT (OUTPATIENT)
Dept: OUTPATIENT SERVICES | Facility: HOSPITAL | Age: 59
LOS: 1 days | End: 2024-05-22
Payer: COMMERCIAL

## 2024-05-22 DIAGNOSIS — D32.9 BENIGN NEOPLASM OF MENINGES, UNSPECIFIED: ICD-10-CM

## 2024-05-22 DIAGNOSIS — Z98.890 OTHER SPECIFIED POSTPROCEDURAL STATES: Chronic | ICD-10-CM

## 2024-05-22 DIAGNOSIS — Z00.8 ENCOUNTER FOR OTHER GENERAL EXAMINATION: ICD-10-CM

## 2024-05-22 DIAGNOSIS — E89.0 POSTPROCEDURAL HYPOTHYROIDISM: Chronic | ICD-10-CM

## 2024-05-22 DIAGNOSIS — Z98.51 TUBAL LIGATION STATUS: Chronic | ICD-10-CM

## 2024-05-22 PROCEDURE — 70553 MRI BRAIN STEM W/O & W/DYE: CPT

## 2024-05-22 PROCEDURE — 70553 MRI BRAIN STEM W/O & W/DYE: CPT | Mod: 26

## 2024-05-22 PROCEDURE — A9585: CPT

## 2024-05-30 ENCOUNTER — APPOINTMENT (OUTPATIENT)
Dept: MRI IMAGING | Facility: CLINIC | Age: 59
End: 2024-05-30
Payer: COMMERCIAL

## 2024-05-30 PROCEDURE — 72197 MRI PELVIS W/O & W/DYE: CPT | Mod: 26

## 2024-06-11 ENCOUNTER — APPOINTMENT (OUTPATIENT)
Dept: NEUROSURGERY | Facility: CLINIC | Age: 59
End: 2024-06-11
Payer: COMMERCIAL

## 2024-06-11 VITALS
BODY MASS INDEX: 22.82 KG/M2 | WEIGHT: 142 LBS | TEMPERATURE: 98.6 F | DIASTOLIC BLOOD PRESSURE: 70 MMHG | OXYGEN SATURATION: 98 % | RESPIRATION RATE: 16 BRPM | SYSTOLIC BLOOD PRESSURE: 113 MMHG | HEART RATE: 68 BPM | HEIGHT: 66 IN

## 2024-06-11 DIAGNOSIS — D32.9 BENIGN NEOPLASM OF MENINGES, UNSPECIFIED: ICD-10-CM

## 2024-06-11 PROCEDURE — 99212 OFFICE O/P EST SF 10 MIN: CPT

## 2024-06-12 PROBLEM — D32.9 MENINGIOMA: Status: ACTIVE | Noted: 2023-09-28

## 2024-06-12 NOTE — ASSESSMENT
[FreeTextEntry1] : Plan:  MRI looks good. Small unresected meningioma unchanged MRI w/o contrast in year. Contact office with any questions or concerns as needed  .IWai evaluated the patient with the nurse practitioner Joann Barthelemy and established the plan of care. I personally discuss this patient with the nurse practitioner at the time of the visit. I agree with the assessment and plan as written, unless noted below.

## 2024-06-12 NOTE — REASON FOR VISIT
[Follow-Up: _____] : a [unfilled] follow-up visit [FreeTextEntry1] : 58-year-old right-handed female with PMH of newly diagnosed Right Papillary Thyroid Cancer, S/P Total Thyroidectomy and Right Neck Lymph Node Dissection at [facility] on 6/12/23 who presents with Brain Tumor.  Patient endorses a brain lesion was incidentally found after she presented to [facility] on 8/24 with syncopal. Episode, fever, and generalized weakness.  Patient states that while hospitalized she underwent imaging of the Brain which revealed large vasogenic edema in the anterior and inferior Right frontal lobe concerning for metastatic disease.   9/7/23 s/p bifrontal craniotomy for resection of brain mass. Path: Right brain lesion- Meningioma, WHO Grade 1 Right frontal lesion biopsy- Meningioma, WHO Grade 1  Today she reports feeling well, denies nasal drainage, reports feeling well has returned to work full time w/o difficulty. Denies loss of taste, is able to smell but states smells are not as "intense".   Neuro Exam: intact

## 2024-06-12 NOTE — PHYSICAL EXAM
[General Appearance - Alert] : alert [General Appearance - In No Acute Distress] : in no acute distress [General Appearance - Well Nourished] : well nourished [Well-Healed] : well-healed [Oriented To Time, Place, And Person] : oriented to person, place, and time [Impaired Insight] : insight and judgment were intact [Affect] : the affect was normal [Person] : oriented to person [Place] : oriented to place [Time] : oriented to time [Short Term Intact] : short term memory intact [Remote Intact] : remote memory intact [Registration Intact] : recent registration memory intact [Motor Tone] : muscle tone was normal in all four extremities [Motor Strength] : muscle strength was normal in all four extremities [Involuntary Movements] : no involuntary movements were seen [Romberg's Sign] : Romberg's sign was negtive [PERRL With Normal Accommodation] : pupils were equal in size, round, reactive to light, with normal accommodation [Extraocular Movements] : extraocular movements were intact [] : no respiratory distress [Abnormal Walk] : normal gait

## 2024-07-14 ENCOUNTER — NON-APPOINTMENT (OUTPATIENT)
Age: 59
End: 2024-07-14

## 2024-07-22 ENCOUNTER — APPOINTMENT (OUTPATIENT)
Dept: RADIOLOGY | Facility: IMAGING CENTER | Age: 59
End: 2024-07-22
Payer: COMMERCIAL

## 2024-07-22 PROCEDURE — 73560 X-RAY EXAM OF KNEE 1 OR 2: CPT | Mod: 26,50

## 2024-07-22 PROCEDURE — 72100 X-RAY EXAM L-S SPINE 2/3 VWS: CPT | Mod: 26

## 2024-08-01 ENCOUNTER — APPOINTMENT (OUTPATIENT)
Dept: RADIOLOGY | Facility: IMAGING CENTER | Age: 59
End: 2024-08-01

## 2024-08-07 PROBLEM — N83.209 SIMPLE OVARIAN CYST: Status: ACTIVE | Noted: 2024-08-07

## 2024-08-08 ENCOUNTER — APPOINTMENT (OUTPATIENT)
Dept: GYNECOLOGIC ONCOLOGY | Facility: CLINIC | Age: 59
End: 2024-08-08

## 2024-08-08 PROCEDURE — 99205 OFFICE O/P NEW HI 60 MIN: CPT

## 2024-08-08 PROCEDURE — 99459 PELVIC EXAMINATION: CPT

## 2024-08-12 PROBLEM — D25.9 FIBROID UTERUS: Status: ACTIVE | Noted: 2024-08-12

## 2024-08-12 PROBLEM — D25.9 UTERINE MYOMA: Noted: 2021-05-06

## 2024-08-12 PROBLEM — R93.89 THICKENED ENDOMETRIUM: Status: ACTIVE | Noted: 2024-08-12

## 2024-08-12 PROBLEM — Z87.898 HISTORY OF DYSURIA: Status: RESOLVED | Noted: 2021-10-20 | Resolved: 2024-08-12

## 2024-08-12 PROBLEM — Z87.42 HISTORY OF MENORRHAGIA: Status: RESOLVED | Noted: 2021-05-06 | Resolved: 2024-08-12

## 2024-08-12 NOTE — OB HISTORY
[Full Term ___] : [unfilled] (full-term) [Living ___] : [unfilled] (living) [Vaginal ___] : [unfilled] vaginal delivery(s) [Menarche Age ____] : age at menarche was [unfilled] [Menopause  Age ____] : menopause occurred at age [unfilled] [Definite ___ (Date)] : the last menstrual period was [unfilled] [Total Preg ___] : : [unfilled] [AB Spont ___] : [unfilled] miscarriage(s)

## 2024-08-13 NOTE — HISTORY OF PRESENT ILLNESS
[FreeTextEntry1] : Ms. ZHAO is a perimenopausal 58 year old  LMP 2023 (one year of amenorrhea) with a h/o fibroids referred by Dr. Dixon for further evaluation due to several imaging findings:  - new left adnexal cyst measuring 5 cm with elevated  @ 61.9 - right lateral fibroid demonstrates an uncharacteristic heterogeneous T2 hyperintense appearance which could represent cystic degeneration. -  persistent endometrial thickening versus endometrial fluid.  She is s/p LSC RFA of fibroids by Dr. Govind Epstein in .  -2024 Pelvic US (office)   Uterus 8.7 x 11.1 x 8.9 cm ; Volume: 451.030 cm   Endometrial Lining- "67.04 mm"   LTO 4 x 3.5 x 2.8 cm   RTO - not described in limited report received.   Fibroids Fibroid 1: 2.82 cm Fibroid 2  4.90 cm Fibroid 3  4.05 cm Fibroid 4  4.75 cm Fibroid 5  4.02 cm ------------- MRI Pelvis 24 ([facility]) Evaluation is somewhat limited due to motion artifact throughout the examination UTERUS: The uterus is anteverted and bulky with numerous fibroids, the majority of which demonstrate enhancement following contrast administration with some dominant fibroids catalogued below. The uterus is somewhat difficult to accurately measure due to motion artifact in the presence of numerous fibroids though appears to measure approximately 6.9 x 12.1 x 12.6 cm in size.  - 5.8 x 6.3 x 6.4 cm exophytic right lateral uterine body/lower uterine segment fibroid with atypical T2 hyperintensity with corresponding heterogenous enhancement, previously measuring 5.6 x 6.7 x 7.6 cm on 2021. 3.3 x 4.4 x 4.9 cm subserosal/partially exophytic left lateral uterine body fibroid (7-22 and 5-33), previously measuring 3.7 x 4.9 x 6.2 cm on 2021. 3.1 x 3.3 x 4.6 cm right anterior myometrial fibroid with approximately 40-50% submucosal component and minimal peripheral enhancement on postcontrast images, previously measuring 4.2 x 4.8 x 6.0 cm.  ENDOMETRIUM: The endometrium is thickened measuring 1.7 cm in AP thickness versus containing fluid. JUNCTIONAL ZONE: Sub optimally assessed due to the presence of numerous fibroids. RIGHT OVARY: Normal right ovary measuring approximately 1.5 x 2.3 x 3.6 cm LEFT OVARY: Normal left ovary measuring approximately 1.5 x 2.5 cm on coronal images, not convincingly visualized on axial images ADNEXA: There is a 2.5 x 4.1 x 5.0 cm simple appearing left adnexal cyst adjacent to the left ovary, either representing a Para ovarian or paratubal cyst. This is new from the prior study. BLADDER: WNL LYMPH NODES: No pelvic lymphadenopathy Abdominal Organs: WNL Bowel: WNL Peritoneum No Ascites Vessels: WNL Abdominal Wall: WNL Bones: WNL IMPRESSION: --Bulky fibroid uterus with numerous large fibroids as detailed above, similar which appear to have slightly decreased in size from prior study. The majority of the fibroids demonstrated enhancement with approximately 3 fibroids demonstrating only peripherally enhancement, suggestive of interval posttreatment changes or degeneration. The right lateral fibroid has an uncharacteristic heterogeneous T2 hyperintense appearance which could represent cystic degeneration given the slight decrease in size from prior study, however given the atypical appearance a follow up pelvis MRI is suggested in 6 months to assess for ability. ---Persistent endometrial thickening versus endometrial fluid --New 2.5 x 4.1 x 5.0 cm simple appearing left adnexal cyst favored to represent a paratubal or para ovarian cyst. no suspicious features identified.   -------------------- Endometrial Biopsy 2023 Gen Path Diagnosis -Mostly Blood -Scant inactive endometrium with chronic endometritis -Negative for hyperplasia or malignancy in current biopsy COMMENT: The diagnostic endometrial tissue is very scant. Clinical and radiological correlation is recommended. Two levels were examined. ------------------ Markers:   5/3/2024 : 61.9 (A) <38.2*  ------------------ PObhx:  x3; + h/o fibroids.  PMHx Papillary thyroid cancer, DCIS, benign brain tumor- meningioma, hyperlipidemia. hypothyroidism. PSHx:  [facility] [place]. Rosie Courtney), 2017 lumpectomy for DCIS,  LSC RFA of fibroids (Dr. Govind Epstein- op note in [facility]) , total thyroidectomy/R neck LND 2023 followed by SIMONE steward, bifrontal craniotomy for benign brain tumor excision (meningioma) 2023.  Family hx of cancer: Father - Colon Ca (diagnosed age 67) , Mat aunt - postmenopausal breast ca ; per patient she personally had genetic testing in  through Dr. Dixon's office and it was negative.  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    She denies pelvic pain/pressure, significant bloating, N/V, abdominal distention or change in bowel or urinary habits.  She denies recent weight changes.   She has no vaginal bleeding since 2023 (one year) and has vasomotor menopausal symptoms. She denies vaginal discharge.   She denies all other associated signs and symptoms.   She is here alone today to discuss further management.   HM: Pap 2024 negative/ HRHPV (-) Mammo/US: 2024 BIRADS2 Colonoscopy : 2021 negative per patient; 5yr f/u recommended  Referred by/GYN : Dr. Rosamaria Dixon PCP : Dr. Patricia Payan

## 2024-08-13 NOTE — ASSESSMENT
[FreeTextEntry1] : 59y/o with a h/o fibroids treated with RFA in past, with thickened endometrium and anatomy precluding adequate office endometrial sampling.

## 2024-08-13 NOTE — PHYSICAL EXAM
[Chaperone Present] : A chaperone was present in the examining room during all aspects of the physical examination [45851] : A chaperone was present during the pelvic exam. [FreeTextEntry2] : Margaux [Abnormal] : Uterus: Abnormal [Normal] : Anus and perineum: Normal sphincter tone, no masses, no prolapse. [de-identified] : thyroidectomy scar noted [de-identified] : deviated by fibroids [de-identified] : bulky fibroid uterus with some mobility [de-identified] : adnexa nonpalpable

## 2024-08-13 NOTE — DISCUSSION/SUMMARY
[Reviewed Clinical Lab Test(s)] : Results of clinical tests were reviewed. [Reviewed Radiology Report(s)] : Radiology reports were reviewed. [Reviewed Radiology Film/Image(s)] : Images from radiology studies were reviewed and examined. [Discuss Tests w/Referring Providers] : Results of labs/radiology studies and the treatment recommendations were discussed with performing/referring physician. [Discuss Alternatives/Risks/Benefits w/Patient] : All alternatives, risks, and benefits were discussed with the patient/family and all questions were answered.  Patient expressed good understanding and appreciates the importance of follow up as recommended. [FreeTextEntry1] : - patient was advised to forward her genetic testing results to our office - her extensive medical and surgical history was reviewed in detail - her GYN history was discussed- she is s/p LSC RFA of the fibroids in 2021, operative report was reviewed. This may account for degnerative appearance on imaging.  - MRI report reviewed in detail and d/w patient. Essentially the endometrium is the area of concern in need of further evaluation as candy next step , with HSC/D&C.  I explained that based on pathology results, further surgery and/or treatment may be needed.  Details of procedure, risks and periop and recuperative issues discussed. Preop sheet and postop instruction sheet given.  She will call Brielle to schedule.  All questions were answered to her apparent satisfaction.

## 2024-08-13 NOTE — PHYSICAL EXAM
[Chaperone Present] : A chaperone was present in the examining room during all aspects of the physical examination [48751] : A chaperone was present during the pelvic exam. [FreeTextEntry2] : Margaux [Abnormal] : Uterus: Abnormal [Normal] : Anus and perineum: Normal sphincter tone, no masses, no prolapse. [de-identified] : thyroidectomy scar noted [de-identified] : deviated by fibroids [de-identified] : bulky fibroid uterus with some mobility [de-identified] : adnexa nonpalpable

## 2024-08-13 NOTE — PHYSICAL EXAM
[Chaperone Present] : A chaperone was present in the examining room during all aspects of the physical examination [20709] : A chaperone was present during the pelvic exam. [FreeTextEntry2] : Margaux [Abnormal] : Uterus: Abnormal [Normal] : Anus and perineum: Normal sphincter tone, no masses, no prolapse. [de-identified] : thyroidectomy scar noted [de-identified] : deviated by fibroids [de-identified] : bulky fibroid uterus with some mobility [de-identified] : adnexa nonpalpable

## 2024-08-13 NOTE — HISTORY OF PRESENT ILLNESS
[FreeTextEntry1] : Ms. ZHAO is a perimenopausal 58 year old  LMP 2023 (one year of amenorrhea) with a h/o fibroids referred by Dr. Dixon for further evaluation due to several imaging findings:  - new left adnexal cyst measuring 5 cm with elevated  @ 61.9 - right lateral fibroid demonstrates an uncharacteristic heterogeneous T2 hyperintense appearance which could represent cystic degeneration. -  persistent endometrial thickening versus endometrial fluid.  She is s/p LSC RFA of fibroids by Dr. Govind Epstein in .  -2024 Pelvic US (office)   Uterus 8.7 x 11.1 x 8.9 cm ; Volume: 451.030 cm   Endometrial Lining- "67.04 mm"   LTO 4 x 3.5 x 2.8 cm   RTO - not described in limited report received.   Fibroids Fibroid 1: 2.82 cm Fibroid 2  4.90 cm Fibroid 3  4.05 cm Fibroid 4  4.75 cm Fibroid 5  4.02 cm ------------- MRI Pelvis 24 ([facility]) Evaluation is somewhat limited due to motion artifact throughout the examination UTERUS: The uterus is anteverted and bulky with numerous fibroids, the majority of which demonstrate enhancement following contrast administration with some dominant fibroids catalogued below. The uterus is somewhat difficult to accurately measure due to motion artifact in the presence of numerous fibroids though appears to measure approximately 6.9 x 12.1 x 12.6 cm in size.  - 5.8 x 6.3 x 6.4 cm exophytic right lateral uterine body/lower uterine segment fibroid with atypical T2 hyperintensity with corresponding heterogenous enhancement, previously measuring 5.6 x 6.7 x 7.6 cm on 2021. 3.3 x 4.4 x 4.9 cm subserosal/partially exophytic left lateral uterine body fibroid (7-22 and 5-33), previously measuring 3.7 x 4.9 x 6.2 cm on 2021. 3.1 x 3.3 x 4.6 cm right anterior myometrial fibroid with approximately 40-50% submucosal component and minimal peripheral enhancement on postcontrast images, previously measuring 4.2 x 4.8 x 6.0 cm.  ENDOMETRIUM: The endometrium is thickened measuring 1.7 cm in AP thickness versus containing fluid. JUNCTIONAL ZONE: Sub optimally assessed due to the presence of numerous fibroids. RIGHT OVARY: Normal right ovary measuring approximately 1.5 x 2.3 x 3.6 cm LEFT OVARY: Normal left ovary measuring approximately 1.5 x 2.5 cm on coronal images, not convincingly visualized on axial images ADNEXA: There is a 2.5 x 4.1 x 5.0 cm simple appearing left adnexal cyst adjacent to the left ovary, either representing a Para ovarian or paratubal cyst. This is new from the prior study. BLADDER: WNL LYMPH NODES: No pelvic lymphadenopathy Abdominal Organs: WNL Bowel: WNL Peritoneum No Ascites Vessels: WNL Abdominal Wall: WNL Bones: WNL IMPRESSION: --Bulky fibroid uterus with numerous large fibroids as detailed above, similar which appear to have slightly decreased in size from prior study. The majority of the fibroids demonstrated enhancement with approximately 3 fibroids demonstrating only peripherally enhancement, suggestive of interval posttreatment changes or degeneration. The right lateral fibroid has an uncharacteristic heterogeneous T2 hyperintense appearance which could represent cystic degeneration given the slight decrease in size from prior study, however given the atypical appearance a follow up pelvis MRI is suggested in 6 months to assess for ability. ---Persistent endometrial thickening versus endometrial fluid --New 2.5 x 4.1 x 5.0 cm simple appearing left adnexal cyst favored to represent a paratubal or para ovarian cyst. no suspicious features identified.   -------------------- Endometrial Biopsy 2023 Gen Path Diagnosis -Mostly Blood -Scant inactive endometrium with chronic endometritis -Negative for hyperplasia or malignancy in current biopsy COMMENT: The diagnostic endometrial tissue is very scant. Clinical and radiological correlation is recommended. Two levels were examined. ------------------ Markers:   5/3/2024 : 61.9 (A) <38.2*  ------------------ PObhx:  x3; + h/o fibroids.  PMHx Papillary thyroid cancer, DCIS, benign brain tumor- meningioma, hyperlipidemia. hypothyroidism. [place]  [facility] [place] Court[place] 2017 lumpectomy for DCIS,  LSC RFA of fibroids (Dr. Govind Epstein- op note in [facility]) , total thyroidectomy/R neck LND 2023 followed by SIMONE steward, bifrontal craniotomy for benign brain tumor excision (meningioma) 2023.  Family hx of cancer: Father - Colon Ca (diagnosed age 67) , Mat aunt - postmenopausal breast ca ; per patient she personally had genetic testing in  through Dr. Dixon's office and it was negative.  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    She denies pelvic pain/pressure, significant bloating, N/V, abdominal distention or change in bowel or urinary habits.  She denies recent weight changes.   She has no vaginal bleeding since 2023 (one year) and has vasomotor menopausal symptoms. She denies vaginal discharge.   She denies all other associated signs and symptoms.   She is here alone today to discuss further management.   HM: Pap 2024 negative/ HRHPV (-) Mammo/US: 2024 BIRADS2 Colonoscopy : 2021 negative per patient; 5yr f/u recommended  Referred by/GYN : Dr. Rosamaria Dixon PCP : Dr. Patricia Payan

## 2024-08-13 NOTE — CHIEF COMPLAINT
[FreeTextEntry1] : persistent endometrial thickening vs. fluid fibroid with atypical appearance elevated  new left ovarian cyst

## 2024-08-13 NOTE — ASSESSMENT
[FreeTextEntry1] : 57y/o with a h/o fibroids treated with RFA in past, with thickened endometrium and anatomy precluding adequate office endometrial sampling.

## 2024-08-13 NOTE — HISTORY OF PRESENT ILLNESS
[FreeTextEntry1] : Ms. ZHAO is a perimenopausal 58 year old  LMP 2023 (one year of amenorrhea) with a h/o fibroids referred by Dr. Dixon for further evaluation due to several imaging findings:  - new left adnexal cyst measuring 5 cm with elevated  @ 61.9 - right lateral fibroid demonstrates an uncharacteristic heterogeneous T2 hyperintense appearance which could represent cystic degeneration. -  persistent endometrial thickening versus endometrial fluid.  She is s/p LSC RFA of fibroids by Dr. Govind Epstein in .  -2024 Pelvic US (office)   Uterus 8.7 x 11.1 x 8.9 cm ; Volume: 451.030 cm   Endometrial Lining- "67.04 mm"   LTO 4 x 3.5 x 2.8 cm   RTO - not described in limited report received.   Fibroids Fibroid 1: 2.82 cm Fibroid 2  4.90 cm Fibroid 3  4.05 cm Fibroid 4  4.75 cm Fibroid 5  4.02 cm ------------- MRI Pelvis 24 ([facility]) Evaluation is somewhat limited due to motion artifact throughout the examination UTERUS: The uterus is anteverted and bulky with numerous fibroids, the majority of which demonstrate enhancement following contrast administration with some dominant fibroids catalogued below. The uterus is somewhat difficult to accurately measure due to motion artifact in the presence of numerous fibroids though appears to measure approximately 6.9 x 12.1 x 12.6 cm in size.  - 5.8 x 6.3 x 6.4 cm exophytic right lateral uterine body/lower uterine segment fibroid with atypical T2 hyperintensity with corresponding heterogenous enhancement, previously measuring 5.6 x 6.7 x 7.6 cm on 2021. 3.3 x 4.4 x 4.9 cm subserosal/partially exophytic left lateral uterine body fibroid (7-22 and 5-33), previously measuring 3.7 x 4.9 x 6.2 cm on 2021. 3.1 x 3.3 x 4.6 cm right anterior myometrial fibroid with approximately 40-50% submucosal component and minimal peripheral enhancement on postcontrast images, previously measuring 4.2 x 4.8 x 6.0 cm.  ENDOMETRIUM: The endometrium is thickened measuring 1.7 cm in AP thickness versus containing fluid. JUNCTIONAL ZONE: Sub optimally assessed due to the presence of numerous fibroids. RIGHT OVARY: Normal right ovary measuring approximately 1.5 x 2.3 x 3.6 cm LEFT OVARY: Normal left ovary measuring approximately 1.5 x 2.5 cm on coronal images, not convincingly visualized on axial images ADNEXA: There is a 2.5 x 4.1 x 5.0 cm simple appearing left adnexal cyst adjacent to the left ovary, either representing a Para ovarian or paratubal cyst. This is new from the prior study. BLADDER: WNL LYMPH NODES: No pelvic lymphadenopathy Abdominal Organs: WNL Bowel: WNL Peritoneum No Ascites Vessels: WNL Abdominal Wall: WNL Bones: WNL IMPRESSION: --Bulky fibroid uterus with numerous large fibroids as detailed above, similar which appear to have slightly decreased in size from prior study. The majority of the fibroids demonstrated enhancement with approximately 3 fibroids demonstrating only peripherally enhancement, suggestive of interval posttreatment changes or degeneration. The right lateral fibroid has an uncharacteristic heterogeneous T2 hyperintense appearance which could represent cystic degeneration given the slight decrease in size from prior study, however given the atypical appearance a follow up pelvis MRI is suggested in 6 months to assess for ability. ---Persistent endometrial thickening versus endometrial fluid --New 2.5 x 4.1 x 5.0 cm simple appearing left adnexal cyst favored to represent a paratubal or para ovarian cyst. no suspicious features identified.   -------------------- Endometrial Biopsy 2023 Gen Path Diagnosis -Mostly Blood -Scant inactive endometrium with chronic endometritis -Negative for hyperplasia or malignancy in current biopsy COMMENT: The diagnostic endometrial tissue is very scant. Clinical and radiological correlation is recommended. Two levels were examined. ------------------ Markers:   5/3/2024 : 61.9 (A) <38.2*  ------------------ PObhx:  x3; + h/o fibroids.  PMHx Papillary thyroid cancer, DCIS, benign brain tumor- meningioma, hyperlipidemia. hypothyroidism. PSHx:  [facility] [place] [facility] (Dr. Rosie Courtney), 2017 lumpectomy for DCIS,  LSC RFA of fibroids (Dr. Govind Epstein- op note in [facility]) , total thyroidectomy/R neck LND 2023 followed by SIMONE steward, bifrontal craniotomy for benign brain tumor excision (meningioma) 2023.  Family hx of cancer: Father - Colon Ca (diagnosed age 67) , Mat aunt - postmenopausal breast ca ; per patient she personally had genetic testing in  through Dr. Dixon's office and it was negative.  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    She denies pelvic pain/pressure, significant bloating, N/V, abdominal distention or change in bowel or urinary habits.  She denies recent weight changes.   She has no vaginal bleeding since 2023 (one year) and has vasomotor menopausal symptoms. She denies vaginal discharge.   She denies all other associated signs and symptoms.   She is here alone today to discuss further management.   HM: Pap 2024 negative/ HRHPV (-) Mammo/US: 2024 BIRADS2 Colonoscopy : 2021 negative per patient; 5yr f/u recommended  Referred by/GYN : Dr. Rosamaria Dixon PCP : Dr. Patricia Payan

## 2024-09-10 ENCOUNTER — OUTPATIENT (OUTPATIENT)
Dept: OUTPATIENT SERVICES | Facility: HOSPITAL | Age: 59
LOS: 1 days | End: 2024-09-10

## 2024-09-10 VITALS
RESPIRATION RATE: 16 BRPM | HEIGHT: 64.5 IN | WEIGHT: 141.98 LBS | DIASTOLIC BLOOD PRESSURE: 73 MMHG | HEART RATE: 61 BPM | TEMPERATURE: 98 F | OXYGEN SATURATION: 100 % | SYSTOLIC BLOOD PRESSURE: 114 MMHG

## 2024-09-10 DIAGNOSIS — Z98.890 OTHER SPECIFIED POSTPROCEDURAL STATES: Chronic | ICD-10-CM

## 2024-09-10 DIAGNOSIS — E03.9 HYPOTHYROIDISM, UNSPECIFIED: ICD-10-CM

## 2024-09-10 DIAGNOSIS — Z90.89 ACQUIRED ABSENCE OF OTHER ORGANS: Chronic | ICD-10-CM

## 2024-09-10 DIAGNOSIS — R93.89 ABNORMAL FINDINGS ON DIAGNOSTIC IMAGING OF OTHER SPECIFIED BODY STRUCTURES: ICD-10-CM

## 2024-09-10 DIAGNOSIS — D25.9 LEIOMYOMA OF UTERUS, UNSPECIFIED: Chronic | ICD-10-CM

## 2024-09-10 DIAGNOSIS — Z98.51 TUBAL LIGATION STATUS: Chronic | ICD-10-CM

## 2024-09-10 DIAGNOSIS — E89.0 POSTPROCEDURAL HYPOTHYROIDISM: Chronic | ICD-10-CM

## 2024-09-10 RX ORDER — SODIUM CHLORIDE 9 MG/ML
3 INJECTION INTRAMUSCULAR; INTRAVENOUS; SUBCUTANEOUS EVERY 8 HOURS
Refills: 0 | Status: DISCONTINUED | OUTPATIENT
Start: 2024-09-18 | End: 2024-10-02

## 2024-09-10 NOTE — H&P PST ADULT - NEUROLOGICAL COMMENTS
pt reports brain lesion was incidentally found after she presented to [facility] on 8/24/23 after syncopal episode, Subsequently dx with meningioma s/p craniotomy. Follow MRI of the brain in May 2024- " Bifrontal craniotomy with postoperative changes along the anterior skull base. No evidence for recurrent/growing lesion".

## 2024-09-10 NOTE — H&P PST ADULT - NSICDXPASTMEDICALHX_GEN_ALL_CORE_FT
PAST MEDICAL HISTORY:  Abnormal findings on diagnostic imaging of other specified body structures     Acquired hypothyroidism     H/O meningioma of the brain     HLD (hyperlipidemia)     Left breast mass     Papillary thyroid carcinoma     Thickened endometrium

## 2024-09-10 NOTE — H&P PST ADULT - ASSESSMENT
58 y/o female with h/o papillary thyroid cancer s/p total thyroidectomy and right neck lymph node dissection in 2023 and meningioma s/p bifrontal craniotomy in 9/2023 presents to PST preop for diagnostic hysteroscopy, dilation and curettage. Pt reports thickened endometrium was noted on pelvic US and Pelvic MRI.

## 2024-09-10 NOTE — H&P PST ADULT - NSICDXPASTSURGICALHX_GEN_ALL_CORE_FT
PAST SURGICAL HISTORY:  H/O benign breast biopsy Left, 2015    H/O colonoscopy     H/O total thyroidectomy     H/O tubal ligation     S/P craniotomy     S/P endometrial ablation     S/P lumpectomy, left breast     S/P tonsillectomy     Uterine myoma

## 2024-09-10 NOTE — H&P PST ADULT - NEGATIVE ENMT SYMPTOMS
no hearing difficulty/no ear pain/no tinnitus/no vertigo/no sinus symptoms/no nasal congestion/no nasal discharge/no post-nasal discharge/no nose bleeds/no gum bleeding/no dry mouth/no throat pain/no dysphagia

## 2024-09-10 NOTE — H&P PST ADULT - GENITOURINARY COMMENTS
preop dx of abnormal findings on diagnostic imagining of other specified body structures. see HPI dx of thickened endometrium

## 2024-09-10 NOTE — H&P PST ADULT - PROBLEM SELECTOR PLAN 3
Postoperative Delirium Screen    Patient eligible for ambrose risk screen age>75? No    Health care proxy paperwork given to patient? Yes     Impaired mobility (ie: uses cane, walker, wheelchair, or assist device)? No    Known dementia diagnosis? No    Impaired functional status (METS<4)? No    Malnutrition BMI<20? No

## 2024-09-10 NOTE — H&P PST ADULT - PROBLEM SELECTOR PLAN 1
preop for diagnostic hysteroscopy, dilation and curettage on 9/18/24  preop instructions given, pt verbalized understanding  GI prophylaxis provided

## 2024-09-10 NOTE — H&P PST ADULT - NEGATIVE GENERAL GENITOURINARY SYMPTOMS
no hematuria/no flank pain L/no flank pain R/no urine discoloration/no urinary hesitancy/normal urinary frequency/no nocturia

## 2024-09-10 NOTE — H&P PST ADULT - NSICDXFAMILYHX_GEN_ALL_CORE_FT
FAMILY HISTORY:  Father  Still living? Unknown  FH: colon cancer, Age at diagnosis: Age Unknown    Mother  Still living? Unknown  FH: HTN (hypertension), Age at diagnosis: Age Unknown

## 2024-09-17 ENCOUNTER — TRANSCRIPTION ENCOUNTER (OUTPATIENT)
Age: 59
End: 2024-09-17

## 2024-09-17 NOTE — ASU PATIENT PROFILE, ADULT - NS PRO AD INFO GIVEN Y
----- Message from Fantasma Francis MD sent at 3/10/2023 12:17 PM CST -----  You are correct, this could be MS related, but sounds as if it is more likely than not a positional type of vertigo.  Staying very well hydrated helps tremendously, and we will have you come in at some point to see if you're positive with a Lori-Hallpike maneuver.  If so we would prescribe meclizine, and do the Barany maneuver to try to dislodge your otoliths that are causing this dizziness.  Furthermore, thank you for obtaining the ultrasound you have less than 50% stenosis in both the right and left internal carotid arteries with blood flow present also in her vertebral arteries.  All of this is considered within normal ranges.  We will await results of your stress test, and have you come in when you can.  
Writer conveyed results and practioner recommendations to Patient.  All questions were answered and Patient verbalized understanding.     appt scheduled 3/15   
yes

## 2024-09-17 NOTE — ASU PATIENT PROFILE, ADULT - PATIENT'S PREFERRED PRONOUN
Patient educated on discharge instructions and two electronic  prescriptions . Patient verbalized understanding of eduction. Patient given discharge instructions . Annamary Ripa No acute distress noted. Emergency Department Nursing Plan of Care       The Nursing Plan of Care is developed from the Nursing assessment and Emergency Department Attending provider initial evaluation. The plan of care may be reviewed in the ED Provider note.     The Plan of Care was developed with the following considerations:   Patient / Family readiness to learn indicated by:verbalized understanding  Persons(s) to be included in education: patient  Barriers to Learning/Limitations:No    Signed     Temo Alcantara RN    4/24/2018   4:42 AM
Patient presented to the ED today for complaints of left sided chest pain and headache. Patient says symptoms started a hour prior to arrival. Patient says she works two jobs. Respirations are even and unlabored. Skin is dry,warm, and intact.
Patient taken to radiology via wheelchair by tech. No acute distress noted.
Her/She

## 2024-09-18 ENCOUNTER — OUTPATIENT (OUTPATIENT)
Dept: OUTPATIENT SERVICES | Facility: HOSPITAL | Age: 59
LOS: 1 days | Discharge: HOME | End: 2024-09-18
Payer: COMMERCIAL

## 2024-09-18 ENCOUNTER — TRANSCRIPTION ENCOUNTER (OUTPATIENT)
Age: 59
End: 2024-09-18

## 2024-09-18 ENCOUNTER — RESULT REVIEW (OUTPATIENT)
Age: 59
End: 2024-09-18

## 2024-09-18 ENCOUNTER — APPOINTMENT (OUTPATIENT)
Dept: GYNECOLOGIC ONCOLOGY | Facility: HOSPITAL | Age: 59
End: 2024-09-18

## 2024-09-18 VITALS
DIASTOLIC BLOOD PRESSURE: 60 MMHG | RESPIRATION RATE: 15 BRPM | TEMPERATURE: 98 F | WEIGHT: 141.98 LBS | SYSTOLIC BLOOD PRESSURE: 107 MMHG | HEIGHT: 64 IN | HEART RATE: 66 BPM

## 2024-09-18 VITALS
DIASTOLIC BLOOD PRESSURE: 47 MMHG | OXYGEN SATURATION: 100 % | RESPIRATION RATE: 15 BRPM | SYSTOLIC BLOOD PRESSURE: 115 MMHG | HEART RATE: 63 BPM

## 2024-09-18 DIAGNOSIS — Z98.890 OTHER SPECIFIED POSTPROCEDURAL STATES: Chronic | ICD-10-CM

## 2024-09-18 DIAGNOSIS — R93.89 ABNORMAL FINDINGS ON DIAGNOSTIC IMAGING OF OTHER SPECIFIED BODY STRUCTURES: ICD-10-CM

## 2024-09-18 DIAGNOSIS — E89.0 POSTPROCEDURAL HYPOTHYROIDISM: Chronic | ICD-10-CM

## 2024-09-18 DIAGNOSIS — D25.9 LEIOMYOMA OF UTERUS, UNSPECIFIED: Chronic | ICD-10-CM

## 2024-09-18 DIAGNOSIS — Z90.89 ACQUIRED ABSENCE OF OTHER ORGANS: Chronic | ICD-10-CM

## 2024-09-18 DIAGNOSIS — Z98.51 TUBAL LIGATION STATUS: Chronic | ICD-10-CM

## 2024-09-18 PROCEDURE — 88305 TISSUE EXAM BY PATHOLOGIST: CPT | Mod: 26

## 2024-09-18 PROCEDURE — 58558 HYSTEROSCOPY BIOPSY: CPT

## 2024-09-18 RX ORDER — ONDANSETRON 2 MG/ML
4 INJECTION, SOLUTION INTRAMUSCULAR; INTRAVENOUS ONCE
Refills: 0 | Status: DISCONTINUED | OUTPATIENT
Start: 2024-09-18 | End: 2024-10-02

## 2024-09-18 RX ORDER — FENTANYL CITRATE 50 UG/ML
25 INJECTION INTRAMUSCULAR; INTRAVENOUS
Refills: 0 | Status: DISCONTINUED | OUTPATIENT
Start: 2024-09-18 | End: 2024-09-18

## 2024-09-18 RX ORDER — FENTANYL CITRATE 50 UG/ML
50 INJECTION INTRAMUSCULAR; INTRAVENOUS
Refills: 0 | Status: DISCONTINUED | OUTPATIENT
Start: 2024-09-18 | End: 2024-09-18

## 2024-09-18 RX ORDER — ROSUVASTATIN CALCIUM 10 MG/1
1 TABLET ORAL
Refills: 0 | DISCHARGE

## 2024-09-18 NOTE — ASU DISCHARGE PLAN (ADULT/PEDIATRIC) - PROVIDER TOKENS
PROVIDER:[TOKEN:[[id number]],SCHEDULEDAPPT:[10/03/2024],SCHEDULEDAPPTTIME:[09:15 AM],ESTABLISHEDPATIENT:[T]]

## 2024-09-18 NOTE — BRIEF OPERATIVE NOTE - PRIMARY SURGEON
Additional History: Patient continues to struggle with acne.  She is using Aczone daily and sometimes using Epiduo.  She sees a lot of blackheads that she can not get rid of. DonteVeronica Levin (Attending) <<----- Click to Select Surgeon

## 2024-09-18 NOTE — BRIEF OPERATIVE NOTE - NSICDXBRIEFPREOP_GEN_ALL_CORE_FT
PRE-OP DIAGNOSIS:  Abnormal findings on diagnostic imaging of other specified body structures 18-Sep-2024 16:07:16  Erlinda Baez

## 2024-09-18 NOTE — ASU DISCHARGE PLAN (ADULT/PEDIATRIC) - ASU DC SPECIAL INSTRUCTIONSFT
Postoperative Instructions    For pain control, take the followin. Ibuprofen 600mg every 6 hours, take with food  2. Add 975mg every 6 hours, alternated with ibuprofen  Tylenol and ibuprofen may be obtained over the counter.    Return to your regular way of eating.     Resume normal activity as tolerated. Complete vaginal rest, no tampons, no douching, no tub bathing, no sexual activities for 2 weeks unless otherwise instructed by your doctor.      Call your doctor with any signs and symptoms of infection such as fever (>100.4 F), chills, nausea or vomiting.  Call your doctor if you're unable to tolerate food or have difficulty urinating.  Call your doctor if you have pain that is not relieved by your prescribed medications.     Notify your doctor with any other concerns. Follow up with Dr. Kent for your next scheduled appointment on 10/3/2024 at 9:15 AM.

## 2024-09-18 NOTE — ASU PREOP CHECKLIST - IV STARTED
"        coenzyme Q10 100 MG capsule Take 1 tablet by mouth daily. LUTEIN-ZEAXANTHIN PO      OneTouch Verio test strip TEST BLOOD GLUCOSE ONCE A DAY 10/28/20    Blood Glucose Monitoring Suppl w/Device Kit Use to test blood sugar daily as directed 12/16/19          Vitals:  Blood pressure 128/72, pulse 100, resp. rate 16, height 4' 10"" (1.473 m), weight 60.8 kg (134 lb), SpO2 94 %. Physical Exam    Problem List Items Addressed This Visit        Behavioral    CHARISSE (generalized anxiety disorder)     Doing well off medication  Denies suicidal ideation             Respiratory    Mild persistent asthma without complication      resumed Advair twice daily  Has not had to use rescue inhaler              Circulatory    Benign hypertension - Primary     Well-controlled with losartan and hydrochlorothiazide            Endocrine    Controlled type 2 diabetes mellitus with hyperglycemia, without long-term current use of insulin (CMS/[facility])     Fasting blood sugars range 1 20-1 30 exercises 15 minutes daily in the winter takes her aspirin. Continues routine diabetic foot care. Mixed hyperlipidemia     Tolerating lovastatin well. Other    Wellness examination     Flu vaccine elsewhere in November  Patient plans to schedule Tdap and hepatitis B vaccine  DEXA scan ordered  Mammogram 9/20/2020  Recommended to bring DNR documentation  Had first  shingles vaccine in September will have the second in coming months         Relevant Orders    BD DEXA AXIAL SKELETON           Follow up: Return in about 2 months (around 1/24/2021) for plea. Discussed medication dosage, usage, goals of therapy, and side effects. The patient indicates understanding of these issues and agrees with the plan.     Andre Smith PA-C       " yes

## 2024-09-18 NOTE — BRIEF OPERATIVE NOTE - NSICDXBRIEFPROCEDURE_GEN_ALL_CORE_FT
PROCEDURES:  Dilation and curettage, uterus 18-Sep-2024 16:05:21  Erlinda Baez  Diagnostic hysteroscopy 18-Sep-2024 16:06:16  Erlinda Baez

## 2024-09-18 NOTE — BRIEF OPERATIVE NOTE - OPERATION/FINDINGS
Exam under anesthesia revealed grossly normal external genitalia. Vagina appeared grossly normal. On exam, cervix deviated with anterior mass palpated from vaginal canal. Cervix appeared grossly normal. Hysteroscopy showed diffuse adhesions and diffuse fluffy endometrial tissue with areas of fibrosis. Bilateral ostia not visualized due to obstruction of endometrial tissue. After dilation and curettage, bilateral ostia visualized and appeared normal.  Exam under anesthesia revealed grossly normal external genitalia. Vagina appeared grossly normal. On exam, cervix deviated by fibroids.  Hysteroscopy showed diffuse adhesions and diffuse scant endometrial tissue with areas of fibrosis. After dilation and curettage, bilateral ostia visualized and appeared normal.

## 2024-09-18 NOTE — ASU DISCHARGE PLAN (ADULT/PEDIATRIC) - CARE PROVIDER_API CALL
Veronica Kent  Gynecologic Oncology  [address] [place] [address]  Phone: [phone]  Fax: [phone]  Established Patient  Scheduled Appointment: 10/03/2024 09:15 AM

## 2024-09-18 NOTE — BRIEF OPERATIVE NOTE - NSICDXBRIEFPOSTOP_GEN_ALL_CORE_FT
Minocycline Pregnancy And Lactation Text: This medication is Pregnancy Category D and not consider safe during pregnancy. It is also excreted in breast milk. Detail Level: Zone Isotretinoin Pregnancy And Lactation Text: This medication is Pregnancy Category X and is considered extremely dangerous during pregnancy. It is unknown if it is excreted in breast milk. Spironolactone Counseling: Patient advised regarding risks of diarrhea, abdominal pain, hyperkalemia, birth defects (for female patients), liver toxicity and renal toxicity. The patient may need blood work to monitor liver and kidney function and potassium levels while on therapy. The patient verbalized understanding of the proper use and possible adverse effects of spironolactone.  All of the patient's questions and concerns were addressed. Topical Retinoid Pregnancy And Lactation Text: This medication is Pregnancy Category C. It is unknown if this medication is excreted in breast milk. Include Pregnancy/Lactation Warning?: No Erythromycin Counseling:  I discussed with the patient the risks of erythromycin including but not limited to GI upset, allergic reaction, drug rash, diarrhea, increase in liver enzymes, and yeast infections. Topical Retinoid counseling:  Patient advised to apply a pea-sized amount only at bedtime and wait 30 minutes after washing their face before applying.  If too drying, patient may add a non-comedogenic moisturizer. The patient verbalized understanding of the proper use and possible adverse effects of retinoids.  All of the patient's questions and concerns were addressed. Azithromycin Counseling:  I discussed with the patient the risks of azithromycin including but not limited to GI upset, allergic reaction, drug rash, diarrhea, and yeast infections. Birth Control Pills Pregnancy And Lactation Text: This medication should be avoided if pregnant and for the first 30 days post-partum. Birth Control Pills Counseling: Birth Control Pill Counseling: I discussed with the patient the potential side effects of OCPs including but not limited to increased risk of stroke, heart attack, thrombophlebitis, deep venous thrombosis, hepatic adenomas, breast changes, GI upset, headaches, and depression.  The patient verbalized understanding of the proper use and possible adverse effects of OCPs. All of the patient's questions and concerns were addressed. Benzoyl Peroxide Counseling: Patient counseled that medicine may cause skin irritation and bleach clothing.  In the event of skin irritation, the patient was advised to reduce the amount of the drug applied or use it less frequently.   The patient verbalized understanding of the proper use and possible adverse effects of benzoyl peroxide.  All of the patient's questions and concerns were addressed. Bactrim Pregnancy And Lactation Text: This medication is Pregnancy Category D and is known to cause fetal risk.  It is also excreted in breast milk. POST-OP DIAGNOSIS:  Abnormal findings on diagnostic imaging of other specified body structures 18-Sep-2024 16:07:35  Erlinda Baez   Topical Sulfur Applications Pregnancy And Lactation Text: This medication is Pregnancy Category C and has an unknown safety profile during pregnancy. It is unknown if this topical medication is excreted in breast milk. Patient Specific Counseling (Will Not Stick From Patient To Patient): refills of clobetasol ointment, solution and ketocoazole shampoo was sent to pharm on file Dapsone Pregnancy And Lactation Text: This medication is Pregnancy Category C and is not considered safe during pregnancy or breast feeding. Dapsone Counseling: I discussed with the patient the risks of dapsone including but not limited to hemolytic anemia, agranulocytosis, rashes, methemoglobinemia, kidney failure, peripheral neuropathy, headaches, GI upset, and liver toxicity.  Patients who start dapsone require monitoring including baseline LFTs and weekly CBCs for the first month, then every month thereafter.  The patient verbalized understanding of the proper use and possible adverse effects of dapsone.  All of the patient's questions and concerns were addressed. Topical Clindamycin Counseling: Patient counseled that this medication may cause skin irritation or allergic reactions.  In the event of skin irritation, the patient was advised to reduce the amount of the drug applied or use it less frequently.   The patient verbalized understanding of the proper use and possible adverse effects of clindamycin.  All of the patient's questions and concerns were addressed. Doxycycline Pregnancy And Lactation Text: This medication is Pregnancy Category D and not consider safe during pregnancy. It is also excreted in breast milk but is considered safe for shorter treatment courses. Topical Clindamycin Pregnancy And Lactation Text: This medication is Pregnancy Category B and is considered safe during pregnancy. It is unknown if it is excreted in breast milk. Azithromycin Pregnancy And Lactation Text: This medication is considered safe during pregnancy and is also secreted in breast milk. Tazorac Counseling:  Patient advised that medication is irritating and drying.  Patient may need to apply sparingly and wash off after an hour before eventually leaving it on overnight.  The patient verbalized understanding of the proper use and possible adverse effects of tazorac.  All of the patient's questions and concerns were addressed. Benzoyl Peroxide Pregnancy And Lactation Text: This medication is Pregnancy Category C. It is unknown if benzoyl peroxide is excreted in breast milk. High Dose Vitamin A Pregnancy And Lactation Text: High dose vitamin A therapy is contraindicated during pregnancy and breast feeding. Erythromycin Pregnancy And Lactation Text: This medication is Pregnancy Category B and is considered safe during pregnancy. It is also excreted in breast milk. Tetracycline Counseling: Patient counseled regarding possible photosensitivity and increased risk for sunburn.  Patient instructed to avoid sunlight, if possible.  When exposed to sunlight, patients should wear protective clothing, sunglasses, and sunscreen.  The patient was instructed to call the office immediately if the following severe adverse effects occur:  hearing changes, easy bruising/bleeding, severe headache, or vision changes.  The patient verbalized understanding of the proper use and possible adverse effects of tetracycline.  All of the patient's questions and concerns were addressed. Patient understands to avoid pregnancy while on therapy due to potential birth defects. Tazorac Pregnancy And Lactation Text: This medication is not safe during pregnancy. It is unknown if this medication is excreted in breast milk. Minocycline Counseling: Patient advised regarding possible photosensitivity and discoloration of the teeth, skin, lips, tongue and gums.  Patient instructed to avoid sunlight, if possible.  When exposed to sunlight, patients should wear protective clothing, sunglasses, and sunscreen.  The patient was instructed to call the office immediately if the following severe adverse effects occur:  hearing changes, easy bruising/bleeding, severe headache, or vision changes.  The patient verbalized understanding of the proper use and possible adverse effects of minocycline.  All of the patient's questions and concerns were addressed. Isotretinoin Counseling: Patient should get monthly blood tests, not donate blood, not drive at night if vision affected, not share medication, and not undergo elective surgery for 6 months after tx completed. Side effects reviewed, pt to contact office should one occur. Topical Sulfur Applications Counseling: Topical Sulfur Counseling: Patient counseled that this medication may cause skin irritation or allergic reactions.  In the event of skin irritation, the patient was advised to reduce the amount of the drug applied or use it less frequently.   The patient verbalized understanding of the proper use and possible adverse effects of topical sulfur application.  All of the patient's questions and concerns were addressed. High Dose Vitamin A Counseling: Side effects reviewed, pt to contact office should one occur. Bactrim Counseling:  I discussed with the patient the risks of sulfa antibiotics including but not limited to GI upset, allergic reaction, drug rash, diarrhea, dizziness, photosensitivity, and yeast infections.  Rarely, more serious reactions can occur including but not limited to aplastic anemia, agranulocytosis, methemoglobinemia, blood dyscrasias, liver or kidney failure, lung infiltrates or desquamative/blistering drug rashes. Doxycycline Counseling:  Patient counseled regarding possible photosensitivity and increased risk for sunburn.  Patient instructed to avoid sunlight, if possible.  When exposed to sunlight, patients should wear protective clothing, sunglasses, and sunscreen.  The patient was instructed to call the office immediately if the following severe adverse effects occur:  hearing changes, easy bruising/bleeding, severe headache, or vision changes.  The patient verbalized understanding of the proper use and possible adverse effects of doxycycline.  All of the patient's questions and concerns were addressed. Spironolactone Pregnancy And Lactation Text: This medication can cause feminization of the male fetus and should be avoided during pregnancy. The active metabolite is also found in breast milk.

## 2024-09-23 ENCOUNTER — NON-APPOINTMENT (OUTPATIENT)
Age: 59
End: 2024-09-23

## 2024-09-24 LAB — SURGICAL PATHOLOGY STUDY: SIGNIFICANT CHANGE UP

## 2024-09-25 ENCOUNTER — APPOINTMENT (OUTPATIENT)
Dept: RADIOLOGY | Facility: IMAGING CENTER | Age: 59
End: 2024-09-25

## 2024-10-03 ENCOUNTER — APPOINTMENT (OUTPATIENT)
Dept: GYNECOLOGIC ONCOLOGY | Facility: CLINIC | Age: 59
End: 2024-10-03
Payer: COMMERCIAL

## 2024-10-03 VITALS
HEIGHT: 66 IN | HEART RATE: 68 BPM | RESPIRATION RATE: 16 BRPM | BODY MASS INDEX: 22.82 KG/M2 | SYSTOLIC BLOOD PRESSURE: 123 MMHG | OXYGEN SATURATION: 99 % | TEMPERATURE: 97.8 F | DIASTOLIC BLOOD PRESSURE: 81 MMHG | WEIGHT: 142 LBS

## 2024-10-03 DIAGNOSIS — R93.89 ABNORMAL FINDINGS ON DIAGNOSTIC IMAGING OF OTHER SPECIFIED BODY STRUCTURES: ICD-10-CM

## 2024-10-03 DIAGNOSIS — D25.9 LEIOMYOMA OF UTERUS, UNSPECIFIED: ICD-10-CM

## 2024-10-03 DIAGNOSIS — N85.2 HYPERTROPHY OF UTERUS: ICD-10-CM

## 2024-10-03 DIAGNOSIS — N83.209 UNSPECIFIED OVARIAN CYST, UNSPECIFIED SIDE: ICD-10-CM

## 2024-10-03 PROBLEM — E03.9 HYPOTHYROIDISM, UNSPECIFIED: Chronic | Status: ACTIVE | Noted: 2024-09-10

## 2024-10-03 PROCEDURE — 99213 OFFICE O/P EST LOW 20 MIN: CPT

## 2024-10-03 NOTE — ASSESSMENT
[FreeTextEntry1] : 58y/o s/p HSC/D&C , healing well. Indeterminate pathology due to anatomic distortion.  PLAN: - f/u MRI pelvis ordered as recommended on 5/30/24 MRI -> due approx 12/1/24; she agreed to schedule Final pathology results were reviewed in detail with the patient and she was given a copy for her records.  Instructions were reviewed.  I explained that the anatomic distortion by the fibroids made samplng the endometrium technically difficult, as evidenced by the pathology. Visually, there was no significant tissue seen, and the MRI reported "tissue vs fluid" in the cavity; therefore we will await the results of f/u MRI to determine whether a hysterectomy is recommended. We briefly discussed hysterectomy and she indicated she would like to consider a concurrent abdominoplasty. She has referral info and was given additional names today, if she wants to start researching. She agreed to schedule the MRI as planned and we will discuss those results with a telehealth when they return.  All questions were answered to her apparent satisfaction.

## 2024-10-03 NOTE — REASON FOR VISIT
[Post Op] : post op visit [de-identified] : 9/18/.24 [de-identified] : HSC/D&C [de-identified] : She reports that she has had a very smooth recovery with no issues. She denies abnl vaginal bleeding, pelvic or abdominal pain or urinary or bowel complaints. No longer requiring pain medication. Returning to usual activities and maintaining pelvic rest.

## 2024-10-03 NOTE — DISCUSSION/SUMMARY
[Doing Well] : is doing well [Excellent Pain Control] : has excellent pain control [No Sign of Infection] : is showing no signs of infection [0] : 0 [Firm] : soft [Tender] : nontender [Cervical Abnormality] : normal cervix [External Genitalia Abnormal] : normal external genitalia [Vaginal Exam Abnormal] : normal vaginal exam [de-identified] : normal [de-identified] : no restrictions [FreeTextEntry1] : Pathology Final Diagnosis 1.  Endometrium (curettage): -   No endometrial glands are identified; inadequate for evaluation. -   Fragments of benign endocervical tissue.

## 2024-10-16 PROBLEM — R93.89 ABNORMAL FINDINGS ON DIAGNOSTIC IMAGING OF OTHER SPECIFIED BODY STRUCTURES: Chronic | Status: ACTIVE | Noted: 2024-09-10

## 2024-10-16 PROBLEM — Z86.011 PERSONAL HISTORY OF BENIGN NEOPLASM OF THE BRAIN: Chronic | Status: ACTIVE | Noted: 2024-09-10

## 2024-10-18 ENCOUNTER — APPOINTMENT (OUTPATIENT)
Dept: ENDOCRINOLOGY | Facility: CLINIC | Age: 59
End: 2024-10-18
Payer: COMMERCIAL

## 2024-10-18 VITALS
HEART RATE: 66 BPM | SYSTOLIC BLOOD PRESSURE: 122 MMHG | BODY MASS INDEX: 23.3 KG/M2 | WEIGHT: 145 LBS | DIASTOLIC BLOOD PRESSURE: 70 MMHG | HEIGHT: 66 IN | OXYGEN SATURATION: 98 %

## 2024-10-18 DIAGNOSIS — C77.0 MALIGNANT NEOPLASM OF THYROID GLAND: ICD-10-CM

## 2024-10-18 DIAGNOSIS — D32.9 BENIGN NEOPLASM OF MENINGES, UNSPECIFIED: ICD-10-CM

## 2024-10-18 DIAGNOSIS — C73 MALIGNANT NEOPLASM OF THYROID GLAND: ICD-10-CM

## 2024-10-18 DIAGNOSIS — E89.0 POSTPROCEDURAL HYPOTHYROIDISM: ICD-10-CM

## 2024-10-18 DIAGNOSIS — E78.5 HYPERLIPIDEMIA, UNSPECIFIED: ICD-10-CM

## 2024-10-18 DIAGNOSIS — Z00.00 ENCOUNTER FOR GENERAL ADULT MEDICAL EXAMINATION W/OUT ABNORMAL FINDINGS: ICD-10-CM

## 2024-10-18 LAB
25(OH)D3 SERPL-MCNC: 22.1 NG/ML
ALBUMIN SERPL ELPH-MCNC: 4.3 G/DL
ALP BLD-CCNC: 75 U/L
ALT SERPL-CCNC: 15 U/L
ANION GAP SERPL CALC-SCNC: 13 MMOL/L
AST SERPL-CCNC: 26 U/L
BILIRUB SERPL-MCNC: 0.8 MG/DL
BUN SERPL-MCNC: 14 MG/DL
CALCIUM SERPL-MCNC: 9.5 MG/DL
CANCER AG125 SERPL-ACNC: 18 U/ML
CHLORIDE SERPL-SCNC: 105 MMOL/L
CHOLEST SERPL-MCNC: 167 MG/DL
CO2 SERPL-SCNC: 26 MMOL/L
CREAT SERPL-MCNC: 0.68 MG/DL
EGFR: 100 ML/MIN/1.73M2
ESTIMATED AVERAGE GLUCOSE: 103 MG/DL
GLUCOSE SERPL-MCNC: 93 MG/DL
HBA1C MFR BLD HPLC: 5.2 %
HDLC SERPL-MCNC: 67 MG/DL
LDLC SERPL CALC-MCNC: 88 MG/DL
NONHDLC SERPL-MCNC: 100 MG/DL
POTASSIUM SERPL-SCNC: 4.9 MMOL/L
PROT SERPL-MCNC: 6.4 G/DL
SODIUM SERPL-SCNC: 144 MMOL/L
T4 FREE SERPL-MCNC: 1.5 NG/DL
THYROGLOB AB SERPL-ACNC: 16.6 IU/ML
THYROGLOB SERPL-MCNC: <0.1 NG/ML
TRIGL SERPL-MCNC: 64 MG/DL
TSH SERPL-ACNC: 0.08 UIU/ML

## 2024-10-18 PROCEDURE — G2211 COMPLEX E/M VISIT ADD ON: CPT

## 2024-10-18 PROCEDURE — 99215 OFFICE O/P EST HI 40 MIN: CPT

## 2024-10-24 ENCOUNTER — OUTPATIENT (OUTPATIENT)
Dept: OUTPATIENT SERVICES | Facility: HOSPITAL | Age: 59
LOS: 1 days | End: 2024-10-24
Payer: COMMERCIAL

## 2024-10-24 ENCOUNTER — APPOINTMENT (OUTPATIENT)
Dept: MRI IMAGING | Facility: IMAGING CENTER | Age: 59
End: 2024-10-24

## 2024-10-24 DIAGNOSIS — Z90.89 ACQUIRED ABSENCE OF OTHER ORGANS: Chronic | ICD-10-CM

## 2024-10-24 DIAGNOSIS — Z98.51 TUBAL LIGATION STATUS: Chronic | ICD-10-CM

## 2024-10-24 DIAGNOSIS — Z98.890 OTHER SPECIFIED POSTPROCEDURAL STATES: Chronic | ICD-10-CM

## 2024-10-24 DIAGNOSIS — N83.209 UNSPECIFIED OVARIAN CYST, UNSPECIFIED SIDE: ICD-10-CM

## 2024-10-24 DIAGNOSIS — E89.0 POSTPROCEDURAL HYPOTHYROIDISM: Chronic | ICD-10-CM

## 2024-10-24 DIAGNOSIS — D25.9 LEIOMYOMA OF UTERUS, UNSPECIFIED: Chronic | ICD-10-CM

## 2024-10-24 DIAGNOSIS — R93.89 ABNORMAL FINDINGS ON DIAGNOSTIC IMAGING OF OTHER SPECIFIED BODY STRUCTURES: ICD-10-CM

## 2024-10-24 DIAGNOSIS — D25.9 LEIOMYOMA OF UTERUS, UNSPECIFIED: ICD-10-CM

## 2024-10-24 PROCEDURE — 72197 MRI PELVIS W/O & W/DYE: CPT | Mod: 26

## 2024-10-24 PROCEDURE — 72197 MRI PELVIS W/O & W/DYE: CPT

## 2024-10-24 PROCEDURE — A9585: CPT

## 2024-11-01 ENCOUNTER — APPOINTMENT (OUTPATIENT)
Dept: GYNECOLOGIC ONCOLOGY | Facility: CLINIC | Age: 59
End: 2024-11-01
Payer: COMMERCIAL

## 2024-11-01 VITALS
WEIGHT: 142 LBS | BODY MASS INDEX: 22.82 KG/M2 | TEMPERATURE: 97.8 F | HEART RATE: 69 BPM | SYSTOLIC BLOOD PRESSURE: 120 MMHG | RESPIRATION RATE: 16 BRPM | DIASTOLIC BLOOD PRESSURE: 73 MMHG | OXYGEN SATURATION: 99 % | HEIGHT: 66 IN

## 2024-11-01 DIAGNOSIS — R93.89 ABNORMAL FINDINGS ON DIAGNOSTIC IMAGING OF OTHER SPECIFIED BODY STRUCTURES: ICD-10-CM

## 2024-11-01 DIAGNOSIS — D25.9 LEIOMYOMA OF UTERUS, UNSPECIFIED: ICD-10-CM

## 2024-11-01 DIAGNOSIS — N85.2 HYPERTROPHY OF UTERUS: ICD-10-CM

## 2024-11-01 PROCEDURE — 99215 OFFICE O/P EST HI 40 MIN: CPT

## 2024-11-01 PROCEDURE — 99459 PELVIC EXAMINATION: CPT

## 2024-11-06 ENCOUNTER — OUTPATIENT (OUTPATIENT)
Dept: OUTPATIENT SERVICES | Facility: HOSPITAL | Age: 59
LOS: 1 days | End: 2024-11-06
Payer: COMMERCIAL

## 2024-11-06 ENCOUNTER — APPOINTMENT (OUTPATIENT)
Dept: RADIOLOGY | Facility: IMAGING CENTER | Age: 59
End: 2024-11-06
Payer: COMMERCIAL

## 2024-11-06 DIAGNOSIS — Z90.89 ACQUIRED ABSENCE OF OTHER ORGANS: Chronic | ICD-10-CM

## 2024-11-06 DIAGNOSIS — D25.9 LEIOMYOMA OF UTERUS, UNSPECIFIED: Chronic | ICD-10-CM

## 2024-11-06 DIAGNOSIS — Z98.51 TUBAL LIGATION STATUS: Chronic | ICD-10-CM

## 2024-11-06 DIAGNOSIS — E89.0 POSTPROCEDURAL HYPOTHYROIDISM: Chronic | ICD-10-CM

## 2024-11-06 DIAGNOSIS — Z98.890 OTHER SPECIFIED POSTPROCEDURAL STATES: Chronic | ICD-10-CM

## 2024-11-06 DIAGNOSIS — Z00.8 ENCOUNTER FOR OTHER GENERAL EXAMINATION: ICD-10-CM

## 2024-11-06 PROCEDURE — 77080 DXA BONE DENSITY AXIAL: CPT

## 2024-11-06 PROCEDURE — 77080 DXA BONE DENSITY AXIAL: CPT | Mod: 26

## 2025-01-22 ENCOUNTER — APPOINTMENT (OUTPATIENT)
Dept: OBGYN | Facility: CLINIC | Age: 60
End: 2025-01-22

## 2025-01-27 ENCOUNTER — APPOINTMENT (OUTPATIENT)
Dept: MAMMOGRAPHY | Facility: IMAGING CENTER | Age: 60
End: 2025-01-27
Payer: COMMERCIAL

## 2025-01-27 ENCOUNTER — APPOINTMENT (OUTPATIENT)
Dept: ULTRASOUND IMAGING | Facility: IMAGING CENTER | Age: 60
End: 2025-01-27
Payer: COMMERCIAL

## 2025-01-27 ENCOUNTER — OUTPATIENT (OUTPATIENT)
Dept: OUTPATIENT SERVICES | Facility: HOSPITAL | Age: 60
LOS: 1 days | End: 2025-01-27
Payer: COMMERCIAL

## 2025-01-27 DIAGNOSIS — Z98.51 TUBAL LIGATION STATUS: Chronic | ICD-10-CM

## 2025-01-27 DIAGNOSIS — Z98.890 OTHER SPECIFIED POSTPROCEDURAL STATES: Chronic | ICD-10-CM

## 2025-01-27 DIAGNOSIS — E89.0 POSTPROCEDURAL HYPOTHYROIDISM: Chronic | ICD-10-CM

## 2025-01-27 DIAGNOSIS — Z00.8 ENCOUNTER FOR OTHER GENERAL EXAMINATION: ICD-10-CM

## 2025-01-27 DIAGNOSIS — Z90.89 ACQUIRED ABSENCE OF OTHER ORGANS: Chronic | ICD-10-CM

## 2025-01-27 DIAGNOSIS — D25.9 LEIOMYOMA OF UTERUS, UNSPECIFIED: Chronic | ICD-10-CM

## 2025-01-27 PROCEDURE — 76641 ULTRASOUND BREAST COMPLETE: CPT | Mod: 26,50

## 2025-01-27 PROCEDURE — 77067 SCR MAMMO BI INCL CAD: CPT

## 2025-01-27 PROCEDURE — 77063 BREAST TOMOSYNTHESIS BI: CPT | Mod: 26

## 2025-01-27 PROCEDURE — 76641 ULTRASOUND BREAST COMPLETE: CPT

## 2025-01-27 PROCEDURE — 77067 SCR MAMMO BI INCL CAD: CPT | Mod: 26

## 2025-01-27 PROCEDURE — 77063 BREAST TOMOSYNTHESIS BI: CPT

## 2025-03-22 ENCOUNTER — NON-APPOINTMENT (OUTPATIENT)
Age: 60
End: 2025-03-22

## 2025-04-19 ENCOUNTER — APPOINTMENT (OUTPATIENT)
Dept: ULTRASOUND IMAGING | Facility: IMAGING CENTER | Age: 60
End: 2025-04-19
Payer: COMMERCIAL

## 2025-04-19 ENCOUNTER — OUTPATIENT (OUTPATIENT)
Dept: OUTPATIENT SERVICES | Facility: HOSPITAL | Age: 60
LOS: 1 days | End: 2025-04-19
Payer: COMMERCIAL

## 2025-04-19 DIAGNOSIS — Z98.890 OTHER SPECIFIED POSTPROCEDURAL STATES: Chronic | ICD-10-CM

## 2025-04-19 DIAGNOSIS — C73 MALIGNANT NEOPLASM OF THYROID GLAND: ICD-10-CM

## 2025-04-19 DIAGNOSIS — D25.9 LEIOMYOMA OF UTERUS, UNSPECIFIED: Chronic | ICD-10-CM

## 2025-04-19 DIAGNOSIS — E89.0 POSTPROCEDURAL HYPOTHYROIDISM: Chronic | ICD-10-CM

## 2025-04-19 DIAGNOSIS — Z90.89 ACQUIRED ABSENCE OF OTHER ORGANS: Chronic | ICD-10-CM

## 2025-04-19 DIAGNOSIS — Z98.51 TUBAL LIGATION STATUS: Chronic | ICD-10-CM

## 2025-04-19 PROCEDURE — 76536 US EXAM OF HEAD AND NECK: CPT | Mod: 26

## 2025-04-19 PROCEDURE — 76536 US EXAM OF HEAD AND NECK: CPT

## 2025-05-01 ENCOUNTER — APPOINTMENT (OUTPATIENT)
Dept: ENDOCRINOLOGY | Facility: CLINIC | Age: 60
End: 2025-05-01
Payer: COMMERCIAL

## 2025-05-01 VITALS
OXYGEN SATURATION: 98 % | SYSTOLIC BLOOD PRESSURE: 112 MMHG | WEIGHT: 132 LBS | HEART RATE: 54 BPM | DIASTOLIC BLOOD PRESSURE: 80 MMHG | BODY MASS INDEX: 21.21 KG/M2 | HEIGHT: 66 IN

## 2025-05-01 DIAGNOSIS — E89.0 POSTPROCEDURAL HYPOTHYROIDISM: ICD-10-CM

## 2025-05-01 DIAGNOSIS — C77.0 MALIGNANT NEOPLASM OF THYROID GLAND: ICD-10-CM

## 2025-05-01 DIAGNOSIS — E78.5 HYPERLIPIDEMIA, UNSPECIFIED: ICD-10-CM

## 2025-05-01 DIAGNOSIS — C73 MALIGNANT NEOPLASM OF THYROID GLAND: ICD-10-CM

## 2025-05-01 PROCEDURE — G2211 COMPLEX E/M VISIT ADD ON: CPT

## 2025-05-01 PROCEDURE — 99215 OFFICE O/P EST HI 40 MIN: CPT

## 2025-05-05 LAB
CANCER AG125 SERPL-ACNC: 8 U/ML
CHOLEST SERPL-MCNC: 268 MG/DL
HDLC SERPL-MCNC: 65 MG/DL
LDLC SERPL-MCNC: 186 MG/DL
NONHDLC SERPL-MCNC: 203 MG/DL
T4 FREE SERPL-MCNC: 1.7 NG/DL
THYROGLOB AB SERPL-ACNC: 18 IU/ML
THYROGLOB SERPL-MCNC: <0.1 NG/ML
TRIGL SERPL-MCNC: 99 MG/DL
TSH SERPL-ACNC: 0.1 UIU/ML

## 2025-06-11 ENCOUNTER — APPOINTMENT (OUTPATIENT)
Dept: MRI IMAGING | Facility: IMAGING CENTER | Age: 60
End: 2025-06-11
Payer: COMMERCIAL

## 2025-06-11 ENCOUNTER — APPOINTMENT (OUTPATIENT)
Dept: NEUROSURGERY | Facility: CLINIC | Age: 60
End: 2025-06-11
Payer: COMMERCIAL

## 2025-06-11 ENCOUNTER — OUTPATIENT (OUTPATIENT)
Dept: OUTPATIENT SERVICES | Facility: HOSPITAL | Age: 60
LOS: 1 days | End: 2025-06-11
Payer: COMMERCIAL

## 2025-06-11 VITALS
OXYGEN SATURATION: 99 % | TEMPERATURE: 98 F | HEIGHT: 66 IN | RESPIRATION RATE: 16 BRPM | WEIGHT: 132 LBS | SYSTOLIC BLOOD PRESSURE: 115 MMHG | DIASTOLIC BLOOD PRESSURE: 85 MMHG | BODY MASS INDEX: 21.21 KG/M2 | HEART RATE: 59 BPM

## 2025-06-11 DIAGNOSIS — Z98.890 OTHER SPECIFIED POSTPROCEDURAL STATES: Chronic | ICD-10-CM

## 2025-06-11 DIAGNOSIS — D32.9 BENIGN NEOPLASM OF MENINGES, UNSPECIFIED: ICD-10-CM

## 2025-06-11 DIAGNOSIS — Z98.51 TUBAL LIGATION STATUS: Chronic | ICD-10-CM

## 2025-06-11 DIAGNOSIS — D25.9 LEIOMYOMA OF UTERUS, UNSPECIFIED: Chronic | ICD-10-CM

## 2025-06-11 DIAGNOSIS — Z90.89 ACQUIRED ABSENCE OF OTHER ORGANS: Chronic | ICD-10-CM

## 2025-06-11 PROCEDURE — 70551 MRI BRAIN STEM W/O DYE: CPT

## 2025-06-11 PROCEDURE — 70551 MRI BRAIN STEM W/O DYE: CPT | Mod: 26

## 2025-06-11 PROCEDURE — 99212 OFFICE O/P EST SF 10 MIN: CPT

## (undated) DEVICE — DRAPE INSTRUMENT POUCH 6.75" X 11"

## (undated) DEVICE — SPECIMEN CONTAINER 100ML

## (undated) DEVICE — WARMING BLANKET UPPER ADULT

## (undated) DEVICE — STAPLER SKIN PROXIMATE ROTATING WIDE

## (undated) DEVICE — GOWN LG

## (undated) DEVICE — PACK MAJOR CHEST BREAST

## (undated) DEVICE — DRAPE MAYO STAND 23"

## (undated) DEVICE — CANISTER SUCTION 2000CC

## (undated) DEVICE — ELCTR CUTTING LOOP 24FR RIGHT ANGLE

## (undated) DEVICE — DRAPE 3/4 SHEET 52X76"

## (undated) DEVICE — DRAPE UNDER BUTTOCKS W SCREEN

## (undated) DEVICE — SOL IRR POUR NS 0.9% 500ML

## (undated) DEVICE — SOL ANTI FOG

## (undated) DEVICE — GLV 6.5 PROTEXIS (BLUE)

## (undated) DEVICE — ELCTR CUTTING LOOP 24FR 12 DEG 0.35 WIRE

## (undated) DEVICE — POSITIONER FOAM EGG CRATE ULNAR 2PCS (PINK)

## (undated) DEVICE — VENODYNE/SCD SLEEVE CALF MEDIUM

## (undated) DEVICE — PREP BETADINE KIT

## (undated) DEVICE — PACK D&C

## (undated) DEVICE — DRAPE LAVH 124" X 30" X125"

## (undated) DEVICE — FOLEY TRAY 16FR LF URINE METER SURESTEP

## (undated) DEVICE — DRAPE TOWEL BLUE 17" X 24"

## (undated) DEVICE — LAP PAD 18 X 18"

## (undated) DEVICE — DRAPE 1/2 SHEET 40X57"

## (undated) DEVICE — DRSG TELFA 3 X 8

## (undated) DEVICE — SOL IRR POUR H2O 250ML

## (undated) DEVICE — CABLE DAC ACTIVE CORD

## (undated) DEVICE — TUBING SUCTION 20FT

## (undated) DEVICE — GLV 6 PROTEXIS (WHITE)